# Patient Record
Sex: MALE | Race: WHITE | NOT HISPANIC OR LATINO | Employment: UNEMPLOYED | URBAN - METROPOLITAN AREA
[De-identification: names, ages, dates, MRNs, and addresses within clinical notes are randomized per-mention and may not be internally consistent; named-entity substitution may affect disease eponyms.]

---

## 2018-01-18 ENCOUNTER — DOCUMENTATION ONLY (OUTPATIENT)
Dept: NEPHROLOGY | Facility: HOSPITAL | Age: 68
End: 2018-01-18

## 2018-01-18 ENCOUNTER — HOSPITAL ENCOUNTER (INPATIENT)
Facility: HOSPITAL | Age: 68
LOS: 8 days | Discharge: NURSING, REHAB OR LONG-TERM CARE | DRG: 871 | End: 2018-01-26
Attending: EMERGENCY MEDICINE | Admitting: FAMILY MEDICINE
Payer: MEDICARE

## 2018-01-18 DIAGNOSIS — E87.20 METABOLIC ACIDOSIS: Primary | ICD-10-CM

## 2018-01-18 DIAGNOSIS — T68.XXXA HYPOTHERMIA, INITIAL ENCOUNTER: ICD-10-CM

## 2018-01-18 DIAGNOSIS — Z45.2 ENCOUNTER FOR CENTRAL LINE CARE: ICD-10-CM

## 2018-01-18 DIAGNOSIS — I48.91 ATRIAL FIBRILLATION: ICD-10-CM

## 2018-01-18 DIAGNOSIS — R79.89 ELEVATED TROPONIN I LEVEL: ICD-10-CM

## 2018-01-18 DIAGNOSIS — N17.9 AKI (ACUTE KIDNEY INJURY): ICD-10-CM

## 2018-01-18 DIAGNOSIS — I48.91 A-FIB: ICD-10-CM

## 2018-01-18 DIAGNOSIS — N17.9 ACUTE RENAL FAILURE, UNSPECIFIED ACUTE RENAL FAILURE TYPE: ICD-10-CM

## 2018-01-18 DIAGNOSIS — E87.5 HYPERKALEMIA: ICD-10-CM

## 2018-01-18 DIAGNOSIS — N17.9 ACUTE RENAL FAILURE: ICD-10-CM

## 2018-01-18 PROBLEM — I47.9 PAROXYSMAL TACHYCARDIA: Status: ACTIVE | Noted: 2018-01-18

## 2018-01-18 PROBLEM — E83.39 HYPERPHOSPHATEMIA: Status: ACTIVE | Noted: 2018-01-18

## 2018-01-18 PROBLEM — N19 UREMIA: Status: ACTIVE | Noted: 2018-01-18

## 2018-01-18 PROBLEM — G93.41 ENCEPHALOPATHY, METABOLIC: Status: ACTIVE | Noted: 2018-01-18

## 2018-01-18 PROBLEM — D72.9 NEUTROPHILIC LEUKOCYTOSIS: Status: ACTIVE | Noted: 2018-01-18

## 2018-01-18 PROBLEM — M62.50 MUSCLE WASTING: Status: ACTIVE | Noted: 2018-01-18

## 2018-01-18 LAB
ALBUMIN SERPL BCP-MCNC: 3.2 G/DL
ALLENS TEST: ABNORMAL
ALP SERPL-CCNC: 113 U/L
ALT SERPL W/O P-5'-P-CCNC: 7 U/L
ANION GAP SERPL CALC-SCNC: ABNORMAL MMOL/L
AST SERPL-CCNC: 12 U/L
BACTERIA #/AREA URNS HPF: ABNORMAL /HPF
BASOPHILS # BLD AUTO: ABNORMAL K/UL
BASOPHILS NFR BLD: 1 %
BILIRUB SERPL-MCNC: 0.5 MG/DL
BILIRUB UR QL STRIP: NEGATIVE
BNP SERPL-MCNC: 445 PG/ML
BUN SERPL-MCNC: 57 MG/DL
CALCIUM SERPL-MCNC: 9.9 MG/DL
CAOX CRY URNS QL MICRO: ABNORMAL
CHLORIDE SERPL-SCNC: 106 MMOL/L
CLARITY UR: ABNORMAL
CO2 SERPL-SCNC: <5 MMOL/L
COLOR UR: YELLOW
CREAT SERPL-MCNC: 12.1 MG/DL
DELSYS: ABNORMAL
DIFFERENTIAL METHOD: ABNORMAL
EOSINOPHIL # BLD AUTO: ABNORMAL K/UL
EOSINOPHIL NFR BLD: 0 %
ERYTHROCYTE [DISTWIDTH] IN BLOOD BY AUTOMATED COUNT: 13.2 %
ERYTHROCYTE [SEDIMENTATION RATE] IN BLOOD BY WESTERGREN METHOD: 18 MM/H
ERYTHROCYTE [SEDIMENTATION RATE] IN BLOOD BY WESTERGREN METHOD: 18 MM/H
EST. GFR  (AFRICAN AMERICAN): 4 ML/MIN/1.73 M^2
EST. GFR  (NON AFRICAN AMERICAN): 4 ML/MIN/1.73 M^2
FIO2: 0.3
FIO2: 50
FLOW: 4
FLOW: 4
GLUCOSE SERPL-MCNC: 122 MG/DL
GLUCOSE UR QL STRIP: NEGATIVE
HCO3 UR-SCNC: 1.1 MMOL/L (ref 24–28)
HCO3 UR-SCNC: 1.5 MMOL/L (ref 24–28)
HCO3 UR-SCNC: 11.3 MMOL/L (ref 24–28)
HCO3 UR-SCNC: 3.4 MMOL/L (ref 24–28)
HCT VFR BLD AUTO: 33.2 %
HGB BLD-MCNC: 10.1 G/DL
HGB UR QL STRIP: NEGATIVE
HYALINE CASTS #/AREA URNS LPF: 1 /LPF
KETONES UR QL STRIP: NEGATIVE
LACTATE SERPL-SCNC: >12 MMOL/L
LEUKOCYTE ESTERASE UR QL STRIP: NEGATIVE
LYMPHOCYTES # BLD AUTO: ABNORMAL K/UL
LYMPHOCYTES NFR BLD: 5 %
MAGNESIUM SERPL-MCNC: 2.3 MG/DL
MCH RBC QN AUTO: 29.7 PG
MCHC RBC AUTO-ENTMCNC: 30.4 G/DL
MCV RBC AUTO: 98 FL
MICROSCOPIC COMMENT: ABNORMAL
MIN VOL: 9.8
MODE: ABNORMAL
MONOCYTES # BLD AUTO: ABNORMAL K/UL
MONOCYTES NFR BLD: 7 %
MYELOCYTES NFR BLD MANUAL: 1 %
NEUTROPHILS NFR BLD: 79 %
NEUTS BAND NFR BLD MANUAL: 7 %
NITRITE UR QL STRIP: NEGATIVE
PCO2 BLDA: 10 MMHG (ref 35–45)
PCO2 BLDA: 14.5 MMHG (ref 35–45)
PCO2 BLDA: 20.9 MMHG (ref 35–45)
PCO2 BLDA: 22 MMHG (ref 35–45)
PEEP: 5
PEEP: 5
PH SMN: 6.63 [PH] (ref 7.35–7.45)
PH SMN: 6.63 [PH] (ref 7.35–7.45)
PH SMN: 6.82 [PH] (ref 7.35–7.45)
PH SMN: 7.32 [PH] (ref 7.35–7.45)
PH UR STRIP: 5 [PH] (ref 5–8)
PHOSPHATE SERPL-MCNC: 14.4 MG/DL
PLATELET # BLD AUTO: 673 K/UL
PLATELET BLD QL SMEAR: ABNORMAL
PMV BLD AUTO: 10.3 FL
PO2 BLDA: 181 MMHG (ref 80–100)
PO2 BLDA: 189 MMHG (ref 80–100)
PO2 BLDA: 192 MMHG (ref 80–100)
PO2 BLDA: 358 MMHG (ref 80–100)
POC BE: -13 MMOL/L
POC BE: -29 MMOL/L
POC BE: <-30 MMOL/L
POC BE: <-30 MMOL/L
POC SATURATED O2: 100 % (ref 95–100)
POC SATURATED O2: 100 % (ref 95–100)
POC SATURATED O2: 97 % (ref 95–100)
POC SATURATED O2: 97 % (ref 95–100)
POC TCO2: 12 MMOL/L (ref 23–27)
POC TCO2: <5 MMOL/L (ref 23–27)
POCT GLUCOSE: 138 MG/DL (ref 70–110)
POCT GLUCOSE: 219 MG/DL (ref 70–110)
POCT GLUCOSE: 232 MG/DL (ref 70–110)
POCT GLUCOSE: 278 MG/DL (ref 70–110)
POCT GLUCOSE: 310 MG/DL (ref 70–110)
POTASSIUM SERPL-SCNC: 7.4 MMOL/L
PROT SERPL-MCNC: 6.6 G/DL
PROT UR QL STRIP: ABNORMAL
RBC # BLD AUTO: 3.4 M/UL
RBC #/AREA URNS HPF: 1 /HPF (ref 0–4)
SAMPLE: ABNORMAL
SITE: ABNORMAL
SODIUM SERPL-SCNC: 141 MMOL/L
SP GR UR STRIP: 1.01 (ref 1–1.03)
SP02: 100
SP02: 100
SP02: 99
SQUAMOUS #/AREA URNS HPF: ABNORMAL /HPF
T4 FREE SERPL-MCNC: 0.71 NG/DL
TOXIC GRANULES BLD QL SMEAR: PRESENT
TROPONIN I SERPL DL<=0.01 NG/ML-MCNC: 0.28 NG/ML
TSH SERPL DL<=0.005 MIU/L-ACNC: 10.76 UIU/ML
URN SPEC COLLECT METH UR: ABNORMAL
UROBILINOGEN UR STRIP-ACNC: NEGATIVE EU/DL
VT: 450
VT: 450
WBC # BLD AUTO: 20.56 K/UL
WBC #/AREA URNS HPF: 3 /HPF (ref 0–5)

## 2018-01-18 PROCEDURE — 63600175 PHARM REV CODE 636 W HCPCS: Performed by: INTERNAL MEDICINE

## 2018-01-18 PROCEDURE — C9113 INJ PANTOPRAZOLE SODIUM, VIA: HCPCS | Performed by: NURSE PRACTITIONER

## 2018-01-18 PROCEDURE — 94761 N-INVAS EAR/PLS OXIMETRY MLT: CPT

## 2018-01-18 PROCEDURE — 99900035 HC TECH TIME PER 15 MIN (STAT)

## 2018-01-18 PROCEDURE — 96361 HYDRATE IV INFUSION ADD-ON: CPT

## 2018-01-18 PROCEDURE — 85027 COMPLETE CBC AUTOMATED: CPT

## 2018-01-18 PROCEDURE — 85007 BL SMEAR W/DIFF WBC COUNT: CPT

## 2018-01-18 PROCEDURE — 83970 ASSAY OF PARATHORMONE: CPT

## 2018-01-18 PROCEDURE — 84484 ASSAY OF TROPONIN QUANT: CPT

## 2018-01-18 PROCEDURE — 90935 HEMODIALYSIS ONE EVALUATION: CPT

## 2018-01-18 PROCEDURE — 87340 HEPATITIS B SURFACE AG IA: CPT

## 2018-01-18 PROCEDURE — 63600175 PHARM REV CODE 636 W HCPCS: Performed by: EMERGENCY MEDICINE

## 2018-01-18 PROCEDURE — 84439 ASSAY OF FREE THYROXINE: CPT

## 2018-01-18 PROCEDURE — 82803 BLOOD GASES ANY COMBINATION: CPT

## 2018-01-18 PROCEDURE — 81000 URINALYSIS NONAUTO W/SCOPE: CPT

## 2018-01-18 PROCEDURE — 83605 ASSAY OF LACTIC ACID: CPT

## 2018-01-18 PROCEDURE — 25000003 PHARM REV CODE 250

## 2018-01-18 PROCEDURE — 93010 ELECTROCARDIOGRAM REPORT: CPT | Mod: ,,, | Performed by: INTERNAL MEDICINE

## 2018-01-18 PROCEDURE — 36556 INSERT NON-TUNNEL CV CATH: CPT

## 2018-01-18 PROCEDURE — 83880 ASSAY OF NATRIURETIC PEPTIDE: CPT

## 2018-01-18 PROCEDURE — 99291 CRITICAL CARE FIRST HOUR: CPT | Mod: 25

## 2018-01-18 PROCEDURE — 82962 GLUCOSE BLOOD TEST: CPT

## 2018-01-18 PROCEDURE — 94002 VENT MGMT INPAT INIT DAY: CPT

## 2018-01-18 PROCEDURE — 36415 COLL VENOUS BLD VENIPUNCTURE: CPT

## 2018-01-18 PROCEDURE — 27000221 HC OXYGEN, UP TO 24 HOURS

## 2018-01-18 PROCEDURE — 96368 THER/DIAG CONCURRENT INF: CPT

## 2018-01-18 PROCEDURE — 80053 COMPREHEN METABOLIC PANEL: CPT

## 2018-01-18 PROCEDURE — 82533 TOTAL CORTISOL: CPT

## 2018-01-18 PROCEDURE — 87086 URINE CULTURE/COLONY COUNT: CPT

## 2018-01-18 PROCEDURE — 93005 ELECTROCARDIOGRAM TRACING: CPT

## 2018-01-18 PROCEDURE — 25000003 PHARM REV CODE 250: Performed by: EMERGENCY MEDICINE

## 2018-01-18 PROCEDURE — 20000000 HC ICU ROOM

## 2018-01-18 PROCEDURE — 96366 THER/PROPH/DIAG IV INF ADDON: CPT

## 2018-01-18 PROCEDURE — 25000003 PHARM REV CODE 250: Performed by: INTERNAL MEDICINE

## 2018-01-18 PROCEDURE — 86704 HEP B CORE ANTIBODY TOTAL: CPT

## 2018-01-18 PROCEDURE — 83735 ASSAY OF MAGNESIUM: CPT

## 2018-01-18 PROCEDURE — 84100 ASSAY OF PHOSPHORUS: CPT

## 2018-01-18 PROCEDURE — 0BH17EZ INSERTION OF ENDOTRACHEAL AIRWAY INTO TRACHEA, VIA NATURAL OR ARTIFICIAL OPENING: ICD-10-PCS | Performed by: FAMILY MEDICINE

## 2018-01-18 PROCEDURE — 63600175 PHARM REV CODE 636 W HCPCS

## 2018-01-18 PROCEDURE — 02HV33Z INSERTION OF INFUSION DEVICE INTO SUPERIOR VENA CAVA, PERCUTANEOUS APPROACH: ICD-10-PCS | Performed by: INTERNAL MEDICINE

## 2018-01-18 PROCEDURE — 86706 HEP B SURFACE ANTIBODY: CPT

## 2018-01-18 PROCEDURE — 87040 BLOOD CULTURE FOR BACTERIA: CPT | Mod: 59

## 2018-01-18 PROCEDURE — 84443 ASSAY THYROID STIM HORMONE: CPT

## 2018-01-18 PROCEDURE — 25000003 PHARM REV CODE 250: Performed by: NURSE PRACTITIONER

## 2018-01-18 PROCEDURE — 96375 TX/PRO/DX INJ NEW DRUG ADDON: CPT

## 2018-01-18 PROCEDURE — 96365 THER/PROPH/DIAG IV INF INIT: CPT

## 2018-01-18 PROCEDURE — 63600175 PHARM REV CODE 636 W HCPCS: Performed by: NURSE PRACTITIONER

## 2018-01-18 PROCEDURE — 51702 INSERT TEMP BLADDER CATH: CPT

## 2018-01-18 PROCEDURE — 5A1945Z RESPIRATORY VENTILATION, 24-96 CONSECUTIVE HOURS: ICD-10-PCS | Performed by: FAMILY MEDICINE

## 2018-01-18 PROCEDURE — 63600175 PHARM REV CODE 636 W HCPCS: Performed by: FAMILY MEDICINE

## 2018-01-18 PROCEDURE — 36600 WITHDRAWAL OF ARTERIAL BLOOD: CPT

## 2018-01-18 RX ORDER — PROPOFOL 10 MG/ML
10 INJECTION, EMULSION INTRAVENOUS CONTINUOUS PRN
Status: DISCONTINUED | OUTPATIENT
Start: 2018-01-18 | End: 2018-01-26 | Stop reason: HOSPADM

## 2018-01-18 RX ORDER — SODIUM BICARBONATE 1 MEQ/ML
SYRINGE (ML) INTRAVENOUS
Status: COMPLETED
Start: 2018-01-18 | End: 2018-01-18

## 2018-01-18 RX ORDER — AMIODARONE HCL/D5W 450 MG/250
1 PLASTIC BAG, INJECTION (ML) INTRAVENOUS CONTINUOUS
Status: DISCONTINUED | OUTPATIENT
Start: 2018-01-18 | End: 2018-01-18

## 2018-01-18 RX ORDER — PANTOPRAZOLE SODIUM 40 MG/10ML
40 INJECTION, POWDER, LYOPHILIZED, FOR SOLUTION INTRAVENOUS DAILY
Status: DISCONTINUED | OUTPATIENT
Start: 2018-01-18 | End: 2018-01-19

## 2018-01-18 RX ORDER — LIDOCAINE HYDROCHLORIDE 10 MG/ML
INJECTION INFILTRATION; PERINEURAL
Status: COMPLETED
Start: 2018-01-18 | End: 2018-01-18

## 2018-01-18 RX ORDER — CIPROFLOXACIN 2 MG/ML
400 INJECTION, SOLUTION INTRAVENOUS
Status: COMPLETED | OUTPATIENT
Start: 2018-01-18 | End: 2018-01-18

## 2018-01-18 RX ORDER — CEFEPIME HYDROCHLORIDE 1 G/1
1 INJECTION, POWDER, FOR SOLUTION INTRAMUSCULAR; INTRAVENOUS
Status: DISCONTINUED | OUTPATIENT
Start: 2018-01-19 | End: 2018-01-22

## 2018-01-18 RX ORDER — CEFEPIME HYDROCHLORIDE 1 G/1
1 INJECTION, POWDER, FOR SOLUTION INTRAMUSCULAR; INTRAVENOUS
Status: COMPLETED | OUTPATIENT
Start: 2018-01-18 | End: 2018-01-18

## 2018-01-18 RX ORDER — CALCIUM GLUCONATE 98 MG/ML
INJECTION, SOLUTION INTRAVENOUS
Status: COMPLETED
Start: 2018-01-18 | End: 2018-01-18

## 2018-01-18 RX ORDER — ONDANSETRON 2 MG/ML
8 INJECTION INTRAMUSCULAR; INTRAVENOUS
Status: COMPLETED | OUTPATIENT
Start: 2018-01-18 | End: 2018-01-18

## 2018-01-18 RX ORDER — CIPROFLOXACIN 2 MG/ML
400 INJECTION, SOLUTION INTRAVENOUS
Status: DISCONTINUED | OUTPATIENT
Start: 2018-01-19 | End: 2018-01-24

## 2018-01-18 RX ORDER — LEVOTHYROXINE SODIUM ANHYDROUS 100 UG/5ML
25 INJECTION, POWDER, LYOPHILIZED, FOR SOLUTION INTRAVENOUS DAILY
Status: DISCONTINUED | OUTPATIENT
Start: 2018-01-18 | End: 2018-01-19

## 2018-01-18 RX ORDER — ONDANSETRON 2 MG/ML
4 INJECTION INTRAMUSCULAR; INTRAVENOUS EVERY 6 HOURS PRN
Status: DISCONTINUED | OUTPATIENT
Start: 2018-01-18 | End: 2018-01-26 | Stop reason: HOSPADM

## 2018-01-18 RX ORDER — SODIUM BICARBONATE 1 MEQ/ML
50 SYRINGE (ML) INTRAVENOUS
Status: COMPLETED | OUTPATIENT
Start: 2018-01-18 | End: 2018-01-18

## 2018-01-18 RX ORDER — SODIUM CHLORIDE 9 MG/ML
INJECTION, SOLUTION INTRAVENOUS CONTINUOUS
Status: DISCONTINUED | OUTPATIENT
Start: 2018-01-18 | End: 2018-01-22

## 2018-01-18 RX ORDER — HEPARIN SODIUM 5000 [USP'U]/ML
5000 INJECTION, SOLUTION INTRAVENOUS; SUBCUTANEOUS
Status: DISCONTINUED | OUTPATIENT
Start: 2018-01-18 | End: 2018-01-26 | Stop reason: HOSPADM

## 2018-01-18 RX ORDER — HEPARIN SODIUM 5000 [USP'U]/ML
5000 INJECTION, SOLUTION INTRAVENOUS; SUBCUTANEOUS EVERY 8 HOURS
Status: DISCONTINUED | OUTPATIENT
Start: 2018-01-18 | End: 2018-01-26

## 2018-01-18 RX ORDER — NOREPINEPHRINE BITARTRATE/D5W 4MG/250ML
0.02 PLASTIC BAG, INJECTION (ML) INTRAVENOUS CONTINUOUS
Status: DISCONTINUED | OUTPATIENT
Start: 2018-01-18 | End: 2018-01-18

## 2018-01-18 RX ORDER — SODIUM BICARBONATE 1 MEQ/ML
100 SYRINGE (ML) INTRAVENOUS ONCE
Status: COMPLETED | OUTPATIENT
Start: 2018-01-18 | End: 2018-01-18

## 2018-01-18 RX ORDER — DEXMEDETOMIDINE HYDROCHLORIDE 4 UG/ML
0.4 INJECTION, SOLUTION INTRAVENOUS CONTINUOUS
Status: DISCONTINUED | OUTPATIENT
Start: 2018-01-18 | End: 2018-01-21

## 2018-01-18 RX ORDER — NOREPINEPHRINE BITARTRATE/D5W 4MG/250ML
PLASTIC BAG, INJECTION (ML) INTRAVENOUS
Status: COMPLETED
Start: 2018-01-18 | End: 2018-01-18

## 2018-01-18 RX ORDER — ONDANSETRON 2 MG/ML
4 INJECTION INTRAMUSCULAR; INTRAVENOUS EVERY 8 HOURS PRN
Status: DISCONTINUED | OUTPATIENT
Start: 2018-01-18 | End: 2018-01-18

## 2018-01-18 RX ADMIN — CEFEPIME 1 G: 1 INJECTION, POWDER, FOR SOLUTION INTRAMUSCULAR; INTRAVENOUS at 02:01

## 2018-01-18 RX ADMIN — HEPARIN SODIUM 5000 UNITS: 5000 INJECTION, SOLUTION INTRAVENOUS; SUBCUTANEOUS at 10:01

## 2018-01-18 RX ADMIN — LEVOTHYROXINE SODIUM ANHYDROUS 25 MCG: 100 INJECTION, POWDER, LYOPHILIZED, FOR SOLUTION INTRAVENOUS at 07:01

## 2018-01-18 RX ADMIN — Medication 1000 MG: at 02:01

## 2018-01-18 RX ADMIN — METHYLPREDNISOLONE SODIUM SUCCINATE 40 MG: 40 INJECTION, POWDER, FOR SOLUTION INTRAMUSCULAR; INTRAVENOUS at 05:01

## 2018-01-18 RX ADMIN — SODIUM BICARBONATE 50 MEQ: 84 INJECTION, SOLUTION INTRAVENOUS at 05:01

## 2018-01-18 RX ADMIN — SODIUM BICARBONATE 100 MEQ: 84 INJECTION, SOLUTION INTRAVENOUS at 05:01

## 2018-01-18 RX ADMIN — CALCIUM GLUCONATE: 94 INJECTION, SOLUTION INTRAVENOUS at 05:01

## 2018-01-18 RX ADMIN — CIPROFLOXACIN 400 MG: 2 INJECTION, SOLUTION INTRAVENOUS at 02:01

## 2018-01-18 RX ADMIN — AMIODARONE HYDROCHLORIDE: 50 INJECTION, SOLUTION INTRAVENOUS at 08:01

## 2018-01-18 RX ADMIN — PANTOPRAZOLE SODIUM 40 MG: 40 INJECTION, POWDER, FOR SOLUTION INTRAVENOUS at 08:01

## 2018-01-18 RX ADMIN — CALCIUM GLUCONATE 1 G: 94 INJECTION, SOLUTION INTRAVENOUS at 02:01

## 2018-01-18 RX ADMIN — DEXMEDETOMIDINE HYDROCHLORIDE 0.4 MCG/KG/HR: 100 INJECTION, SOLUTION INTRAVENOUS at 06:01

## 2018-01-18 RX ADMIN — AMIODARONE HYDROCHLORIDE: 50 INJECTION, SOLUTION INTRAVENOUS at 07:01

## 2018-01-18 RX ADMIN — PROPOFOL 10 MCG/KG/MIN: 10 INJECTION, EMULSION INTRAVENOUS at 05:01

## 2018-01-18 RX ADMIN — SODIUM CHLORIDE 1000 ML: 0.9 INJECTION, SOLUTION INTRAVENOUS at 07:01

## 2018-01-18 RX ADMIN — AMIODARONE HYDROCHLORIDE 150 MG: 1.5 INJECTION, SOLUTION INTRAVENOUS at 07:01

## 2018-01-18 RX ADMIN — DEXMEDETOMIDINE HYDROCHLORIDE 1.4 MCG/KG/HR: 100 INJECTION, SOLUTION INTRAVENOUS at 09:01

## 2018-01-18 RX ADMIN — HEPARIN SODIUM 5000 UNITS: 5000 INJECTION, SOLUTION INTRAVENOUS; SUBCUTANEOUS at 08:01

## 2018-01-18 RX ADMIN — DEXTROSE MONOHYDRATE 25 G: 25 INJECTION, SOLUTION INTRAVENOUS at 01:01

## 2018-01-18 RX ADMIN — Medication: at 05:01

## 2018-01-18 RX ADMIN — INSULIN HUMAN 7.03 UNITS: 100 INJECTION, SOLUTION PARENTERAL at 01:01

## 2018-01-18 RX ADMIN — ONDANSETRON 8 MG: 2 INJECTION INTRAMUSCULAR; INTRAVENOUS at 01:01

## 2018-01-18 RX ADMIN — Medication 100 MEQ: at 05:01

## 2018-01-18 RX ADMIN — SODIUM BICARBONATE 100 MEQ: 84 INJECTION, SOLUTION INTRAVENOUS at 08:01

## 2018-01-18 RX ADMIN — SODIUM CHLORIDE: 0.9 INJECTION, SOLUTION INTRAVENOUS at 04:01

## 2018-01-18 RX ADMIN — LIDOCAINE HYDROCHLORIDE: 10 INJECTION, SOLUTION INFILTRATION; PERINEURAL at 05:01

## 2018-01-18 RX ADMIN — NOREPINEPHRINE BITARTRATE 0.02 MCG/KG/MIN: 1 INJECTION INTRAVENOUS at 07:01

## 2018-01-18 RX ADMIN — PROPOFOL 50 MCG/KG/MIN: 10 INJECTION, EMULSION INTRAVENOUS at 08:01

## 2018-01-18 RX ADMIN — SODIUM CHLORIDE 1000 ML: 0.9 INJECTION, SOLUTION INTRAVENOUS at 01:01

## 2018-01-18 RX ADMIN — SODIUM BICARBONATE: 84 INJECTION, SOLUTION INTRAVENOUS at 03:01

## 2018-01-18 NOTE — PROGRESS NOTES
Results for ERNA PEREZ (MRN [id number]) as of 1/18/2018 17:36   Ref. Range 1/18/2018 16:00   POC PH Latest Ref Range: 7.35 - 7.45  6.630 (LL)   POC PCO2 Latest Ref Range: 35 - 45 mmHg 10.0 (LL)   POC PO2 Latest Ref Range: 80 - 100 mmHg 189 (H)   POC BE Latest Ref Range: -2 to 2 mmol/L <-30 (L)   POC HCO3 Latest Ref Range: 24 - 28 mmol/L 1.1 (L)   POC SATURATED O2 Latest Ref Range: 95 - 100 % 97   POC TCO2 Latest Ref Range: 23 - 27 mmol/L <5 (L)   Flow Unknown 4   Sample Unknown ARTERIAL   DelSys Unknown Nasal Can   Allens Test Unknown Pass   Site Unknown LR   Mode Unknown SPONT   Sp02 Unknown 100   ABG was done and reported to Dr Medeiros. Pt was intubated with 7.5 ETT@23 at lip. Vent settings PRVC/18/450/+5/50% sats 100%.

## 2018-01-18 NOTE — PROCEDURES
After obtaining informed consent proceeded to gown up with sterile gown and gloves , mask wore as well as eye protection, then cleaned the R groin area with antiseptic provided. Used 1% lidocaine without epinephrine and provided superficial as well as superficial anesthesia.    Following sterile technic and Seldinger technic cannulated the R femoral vein with 18 gauge needle, single pass was needed to localize the vein. Advanced a flexible guidewire to about 20 cm, removed the needle then made a skin nick with an 11 scalpel. Advanced a vein dilator which was also removed. The the Arnaldo catheter was advanced into the femoral vein and stitched in place with vicryl stitches x2.    The area was examined afterwards, a clean sterile dressing was applied. Cathete lumens were flushed and locked with 10 cc NSS.    The pateint had a 10 cc blood loss and there were no hematomas post procedure and there are good popliteal, and DP pulses.

## 2018-01-18 NOTE — CONSULTS
Ameya Malloy MD Physician Sign at close encounter   Progress Notes Encounter Date: 1/18/2018  2:44 PM   Related encounter: Documentation Only from 1/18/2018 in [facility]       Expand All Collapse All    []Hide copied text  []Hover for attribution information  Unfortunate 67 y o male who was brought to the er with AMS and increased resp rate. Renal consult requested because SOURAV, Hyperk and metabolic acidosis.     Pt is minimally responsive  No past medical history on file.      Allergies unklnown     No Known Allergies     No family history on file.     Social History   Social History            Social History    Marital status: Single       Spouse name: N/A    Number of children: N/A    Years of education: N/A          Occupational History    Not on file.      Social History Main Topics    Smoking status: Not on file    Smokeless tobacco: Not on file    Alcohol use Not on file    Drug use: Unknown    Sexual activity: Not on file           Other Topics Concern    Not on file          Social History Narrative    No narrative on file               No current facility-administered medications for this visit.       No current outpatient prescriptions on file.          Facility-Administered Medications Ordered in Other Visits   Medication    calcium gluconate 1g in dextrose 5% 100mL (ready to mix system)    ciprofloxacin (CIPRO)400mg/200ml D5W IVPB 400 mg    dextrose 50% injection 25 g    sodium bicarbonate 1 mEq/mL (8.4 %) 150 mEq in dextrose 5 % 1,000 mL infusion    sodium bicarbonate 8.4 % (1 mEq/mL) injection 50 mEq    sodium bicarbonate 8.4 % (1 mEq/mL) injection 50 mEq    sodium polystyrene 15 gram/60 mL suspension 30 g    vancomycin 1 g in dextrose 5 % 250 mL IVPB (ready to mix system)         LABS     Recent Results         Recent Results (from the past 24 hour(s))   POCT glucose     Collection Time: 01/18/18 12:11 PM   Result Value Ref Range     POCT Glucose 138 (H) 70 -  110 mg/dL   CBC auto differential     Collection Time: 01/18/18 12:25 PM   Result Value Ref Range     WBC 20.56 (H) 3.90 - 12.70 K/uL     RBC 3.40 (L) 4.60 - 6.20 M/uL     Hemoglobin 10.1 (L) 14.0 - 18.0 g/dL     Hematocrit 33.2 (L) 40.0 - 54.0 %     MCV 98 82 - 98 fL     MCH 29.7 27.0 - 31.0 pg     MCHC 30.4 (L) 32.0 - 36.0 g/dL     RDW 13.2 11.5 - 14.5 %     Platelets 673 (H) 150 - 350 K/uL     MPV 10.3 9.2 - 12.9 fL     Lymph # CANCELED 1.0 - 4.8 K/uL     Mono # CANCELED 0.3 - 1.0 K/uL     Eos # CANCELED 0.0 - 0.5 K/uL     Baso # CANCELED 0.00 - 0.20 K/uL     Gran% 79.0 (H) 38.0 - 73.0 %     Lymph% 5.0 (L) 18.0 - 48.0 %     Mono% 7.0 4.0 - 15.0 %     Eosinophil% 0.0 0.0 - 8.0 %     Basophil% 1.0 0.0 - 1.9 %     Bands 7.0 %     Myelocytes 1.0 %     Platelet Estimate Increased (A)       Toxic Granulation Present       Differential Method Manual     Comprehensive metabolic panel     Collection Time: 01/18/18 12:25 PM   Result Value Ref Range     Sodium 141 136 - 145 mmol/L     Potassium 7.4 (HH) 3.5 - 5.1 mmol/L     Chloride 106 95 - 110 mmol/L     CO2 <5 (LL) 23 - 29 mmol/L     Glucose 122 (H) 70 - 110 mg/dL     BUN, Bld 57 (H) 8 - 23 mg/dL     Creatinine 12.1 (H) 0.5 - 1.4 mg/dL     Calcium 9.9 8.7 - 10.5 mg/dL     Total Protein 6.6 6.0 - 8.4 g/dL     Albumin 3.2 (L) 3.5 - 5.2 g/dL     Total Bilirubin 0.5 0.1 - 1.0 mg/dL     Alkaline Phosphatase 113 55 - 135 U/L     AST 12 10 - 40 U/L     ALT 7 (L) 10 - 44 U/L     Anion Gap Unable to calculate 8 - 16 mmol/L     eGFR if African American 4 (A) >60 mL/min/1.73 m^2     eGFR if non African American 4 (A) >60 mL/min/1.73 m^2   Troponin I     Collection Time: 01/18/18 12:25 PM   Result Value Ref Range     Troponin I 0.278 (H) 0.000 - 0.026 ng/mL   B-Type natriuretic peptide (BNP)     Collection Time: 01/18/18 12:25 PM   Result Value Ref Range      (H) 0 - 99 pg/mL   Lactic acid, plasma     Collection Time: 01/18/18  1:30 PM   Result Value Ref Range     Lactate  (Lactic Acid) >12.0 (HH) 0.5 - 2.2 mmol/L   ISTAT PROCEDURE     Collection Time: 01/18/18  2:17 PM   Result Value Ref Range     POC PH 6.630 (LL) 7.35 - 7.45     POC PCO2 14.5 (LL) 35 - 45 mmHg     POC PO2 192 (H) 80 - 100 mmHg     POC HCO3 1.5 (L) 24 - 28 mmol/L     POC BE <-30 (L) -2 to 2 mmol/L     POC SATURATED O2 97 95 - 100 %     POC TCO2 <5 (L) 23 - 27 mmol/L     Sample ARTERIAL       Site LR       Allens Test Pass       DelSys Nasal Can       Mode SPONT       Flow 4       Sp02 99     POCT glucose     Collection Time: 01/18/18  2:35 PM   Result Value Ref Range     POCT Glucose 278 (H) 70 - 110 mg/dL         115/60 72/min 26/min hypothermic (91)  No Jvd, Thyromegaly or Lymphadenopathy  Lungs: Fairly clear anteriorly and laterally  Cor: RRR no G or rubs  Abd: Soft benign good bowel sounds non tender  Ext: No E C C  Skin dry and tenting over clavicles     A)  Severe wide anion gap met acidosis (L Ac 12+)  Severe SOURAV  Dehydration  Sepsis until proven otherwise  Pos card markers  HyperK  DM  Mild relative hypotension  Elevated WBC with L shift  Anemia with normal mcv  Hyper po4     Recom:     Admit to ICU ASAP  ASAP HD (do or die)  TLC  Cont bicab drip  CA iv  Might need intubation and mechanical vent       PS    Repeat ABG's again severe metabolic acidosis = will intubate and recheck    Will need HD ASAP:, [facility]    No family available they are in route no phone sv where they are at                Talked to IR and Anesthesia; Anesthesia told me that they do not insert double                  lumen IV catheters. They recommend consult with IR.                 Talked to IR they are willing and able to insert a double lumen cath but only at                the IR suite.     This patient is too unstable to leave a critical care area therefore I will insert an HD cath   Verbal consent obtained from patients sister (Mrs Jeff) at 4:35 pm      Pt on HD not stable, on levophed, hypothermia continues. Ca and bicarb  given, bicarb drip increased.     RN's noted black tarry stool !!

## 2018-01-18 NOTE — ED PROVIDER NOTES
Encounter Date: 1/18/2018       History     Chief Complaint   Patient presents with    Atrial Fibrillation     Pt sent from [facility] with new onset of Afib; along with n/v      HPI   This 67-year-old male presents to emergency room after being sent from [facility] for new onset of atrial fibrillation along with nausea vomiting and rapid respirations.  The patient appears to be an impaired historian.  Some of his speech is unintelligible.  He was found to have sat oxygen saturations of 99% in the field.  The patient relates that he was vomiting.  He did vomit 7 times.  There is no history of abdominal pain or diarrhea.  The patient has a history of gastroesophageal reflux disease hypertension diabetes and hypothyroidism.  He has a history of depression and muscular weakness.  Review of patient's allergies indicates:  No Known Allergies  History reviewed. No pertinent past medical history.  History reviewed. No pertinent surgical history.  History reviewed. No pertinent family history.  Social History   Substance Use Topics    Smoking status: Not on file    Smokeless tobacco: Not on file    Alcohol use Not on file     Review of Systems  The patient was questioned specifically with regard to the following.  General: Fever, chills, sweats. Neuro: Headache. Eyes: eye problems. ENT: Ear pain, sore throat. Cardiovascular: Chest pain. Respiratory: Cough, shortness of breath. Gastrointestinal: Abdominal pain, vomiting, diarrhea. Genitourinary: Painful urination.  Musculoskeletal: Arm and leg problems. Skin: Rash.  The review of systems was negative except for the following: Rapid respiration vomiting, possible new atrial fibrillation.  Physical Exam     Initial Vitals [01/18/18 1128]   BP Pulse Resp Temp SpO2   118/87 75 (!) 30 -- 100 %      MAP       97.33         Physical Exam  The patient was examined specifically for the following:   General:No significant distress, Good color, Warm and dry. Head and  neck:Scalp atraumatic, Neck supple. Neurological:Appropriate conversation, Gross motor deficits. Eyes:Conjugate gaze, Clear corneas. ENT: No epistaxis. Cardiac: Regular rate and rhythm, Grossly normal heart tones. Pulmonary: Wheezing, Rales. Gastrointestinal: Abdominal tenderness, Abdominal distention. Musculoskeletal: Extremity deformity, Apparent pain with range of motion of the joints. Skin: Rash.   The findings on examination were normal except for the following: The patient seems to have some muscle wasting diffusely.  He mumbles.  He holds his eyes closed.  He is responsive in conversation.  Sometimes he mumbles in the speech is unintelligible.  He seems to understand questions and answers them.  He has no focal motor neurologic deficit.  His lips and mucous membranes are dry.  ED Course   Central Line  Date/Time: 1/18/2018 9:35 PM  Performed by: LEIGHA LUONG  Indications: med administration and vascular access  Anesthesia: local infiltration    Anesthesia:  Local Anesthetic: lidocaine 1% without epinephrine  Preparation: skin prepped with ChloraPrep  Location details: right internal jugular  Catheter size: 7 Fr  Ultrasound guidance: yes  Vessel Caliber: mediumNeedle advanced into vessel with real time Ultrasound guidance.  Number of attempts: 1  Assessment: placement verified by x-ray  Specimens: No  Complications: No    Critical Care  Date/Time: 1/18/2018 9:41 PM  Performed by: LEIGHA LUONG  Authorized by: LUIS FERNANDO WHITTINGTON   Direct patient critical care time: 22 minutes  Additional history critical care time: 11 minutes  Ordering / reviewing critical care time: 11 minutes  Documentation critical care time: 17 minutes  Consulting other physicians critical care time: 9 minutes  Consult with family critical care time: 4 minutes  Total critical care time (exclusive of procedural time) : 74 minutes  Critical care time was exclusive of separately billable procedures and treating other patients and  teaching time.  Critical care was necessary to treat or prevent imminent or life-threatening deterioration of the following conditions: metabolic crisis and renal failure.  Critical care was time spent personally by me on the following activities: development of treatment plan with patient or surrogate, discussions with consultants, discussions with primary provider, evaluation of patient's response to treatment, examination of patient, obtaining history from patient or surrogate, ordering and performing treatments and interventions, ordering and review of laboratory studies, ordering and review of radiographic studies, pulse oximetry, re-evaluation of patient's condition and review of old charts.        Labs Reviewed   CBC W/ AUTO DIFFERENTIAL - Abnormal; Notable for the following:        Result Value    WBC 20.56 (*)     RBC 3.40 (*)     Hemoglobin 10.1 (*)     Hematocrit 33.2 (*)     MCHC 30.4 (*)     Platelets 673 (*)     Gran% 79.0 (*)     Lymph% 5.0 (*)     Platelet Estimate Increased (*)     All other components within normal limits   COMPREHENSIVE METABOLIC PANEL - Abnormal; Notable for the following:     Potassium 7.4 (*)     CO2 <5 (*)     Glucose 122 (*)     BUN, Bld 57 (*)     Creatinine 12.1 (*)     Albumin 3.2 (*)     ALT 7 (*)     eGFR if  4 (*)     eGFR if non  4 (*)     All other components within normal limits    Narrative:     POTASSIUM AND CO2 critical result(s) called and verbal readback   obtained from KEELY EDWARDS, 01/18/2018 13:30   TROPONIN I - Abnormal; Notable for the following:     Troponin I 0.278 (*)     All other components within normal limits   B-TYPE NATRIURETIC PEPTIDE - Abnormal; Notable for the following:      (*)     All other components within normal limits   LACTIC ACID, PLASMA - Abnormal; Notable for the following:     Lactate (Lactic Acid) >12.0 (*)     All other components within normal limits    Narrative:     Lactic acid critical  result(s) called and verbal readback obtained   from Aviva Malonedy. , 01/18/2018 14:25   URINALYSIS - Abnormal; Notable for the following:     Appearance, UA Cloudy (*)     Protein, UA 1+ (*)     All other components within normal limits   PHOSPHORUS - Abnormal; Notable for the following:     Phosphorus 14.4 (*)     All other components within normal limits    Narrative:     PHOSPHORUS critical result(s) called and verbal readback obtained   from AVIVA UNDERWOOD, 01/18/2018 15:10   URINALYSIS MICROSCOPIC - Abnormal; Notable for the following:     Bacteria, UA Few (*)     All other components within normal limits   POCT GLUCOSE - Abnormal; Notable for the following:     POCT Glucose 138 (*)     All other components within normal limits   ISTAT PROCEDURE - Abnormal; Notable for the following:     POC PH 6.630 (*)     POC PCO2 14.5 (*)     POC PO2 192 (*)     POC HCO3 1.5 (*)     POC BE <-30 (*)     POC TCO2 <5 (*)     All other components within normal limits   POCT GLUCOSE - Abnormal; Notable for the following:     POCT Glucose 278 (*)     All other components within normal limits   CULTURE, URINE   MAGNESIUM   PTH, INTACT   POCT GLUCOSE MONITORING CONTINUOUS     EKG Readings: (Independently Interpreted)   This patient is in an undetermined rhythm.  The heart rate is 72.  I believe this is a sinus rhythm with a first-degree AV block.  P waves are difficult to see.        X-Rays:   Independently Interpreted Readings:   Other Readings:  Chest x-ray fails to reveal pneumothorax pneumonia pleural effusion.    Medical decision making: Given the above this patient presents to emergency room acute renal failure with a creatinine of 12 and a potassium of 7.4.  Patient's serum CO2 is 5.  The patient is hypothermic with a rectal temperature of 91°.  We will attempt to put him on a warming blanket.  I will consult nephrology.  I'm consult [facility] medicine at this time.  I am not sure about the patient's rhythm.  This could  be A. fib, junctional rhythm, sinus rhythm, second-degree AV block.  I believe that his potassium should be corrected before his rhythm is managed.  I believe the patient may be septic.  We will try to put him on a warming blanket.   I discussed this case with Dr. Hill Srivastava.  There are no beds in the ICU.  We have no other warming blankets.  We will attempt to treat this patient as best we can.  Does recommend IV bicarbonate.                        ED Course      Clinical Impression:   The primary encounter diagnosis was Metabolic acidosis. Diagnoses of Atrial fibrillation, Acute renal failure, unspecified acute renal failure type, Hypothermia, initial encounter, Hyperkalemia, Elevated troponin I level, and Encounter for central line care were also pertinent to this visit.                           Willie Lake MD  01/18/18 2129       Willie Lake MD  01/18/18 2138       Willie Lake MD  01/18/18 2138       Willie Lake MD  01/18/18 2140       Willie Lake MD  01/18/18 2142

## 2018-01-18 NOTE — ED TRIAGE NOTES
Pt arrived to ED via EMS from nursing home for N/V, new onset afib and increased work of breathing. On arrival EMS O2 sat was 99% on RA. Pt's breathing is labored at this time and RR is 26 per min.

## 2018-01-18 NOTE — PROGRESS NOTES
Unfortunate 67 y o male who was brought to the er with AMS and increased resp rate. Renal consult requested because SOURAV, Hyperk and metabolic acidosis.    Pt is minimally responsive  No past medical history on file.     Allergies unklnown    No Known Allergies    No family history on file.    Social History     Social History    Marital status: Single     Spouse name: N/A    Number of children: N/A    Years of education: N/A     Occupational History    Not on file.     Social History Main Topics    Smoking status: Not on file    Smokeless tobacco: Not on file    Alcohol use Not on file    Drug use: Unknown    Sexual activity: Not on file     Other Topics Concern    Not on file     Social History Narrative    No narrative on file         No current facility-administered medications for this visit.      No current outpatient prescriptions on file.     Facility-Administered Medications Ordered in Other Visits   Medication    calcium gluconate 1g in dextrose 5% 100mL (ready to mix system)    ciprofloxacin (CIPRO)400mg/200ml D5W IVPB 400 mg    dextrose 50% injection 25 g    sodium bicarbonate 1 mEq/mL (8.4 %) 150 mEq in dextrose 5 % 1,000 mL infusion    sodium bicarbonate 8.4 % (1 mEq/mL) injection 50 mEq    sodium bicarbonate 8.4 % (1 mEq/mL) injection 50 mEq    sodium polystyrene 15 gram/60 mL suspension 30 g    vancomycin 1 g in dextrose 5 % 250 mL IVPB (ready to mix system)       LABS    Recent Results (from the past 24 hour(s))   POCT glucose    Collection Time: 01/18/18 12:11 PM   Result Value Ref Range    POCT Glucose 138 (H) 70 - 110 mg/dL   CBC auto differential    Collection Time: 01/18/18 12:25 PM   Result Value Ref Range    WBC 20.56 (H) 3.90 - 12.70 K/uL    RBC 3.40 (L) 4.60 - 6.20 M/uL    Hemoglobin 10.1 (L) 14.0 - 18.0 g/dL    Hematocrit 33.2 (L) 40.0 - 54.0 %    MCV 98 82 - 98 fL    MCH 29.7 27.0 - 31.0 pg    MCHC 30.4 (L) 32.0 - 36.0 g/dL    RDW 13.2 11.5 - 14.5 %    Platelets 673 (H)  150 - 350 K/uL    MPV 10.3 9.2 - 12.9 fL    Lymph # CANCELED 1.0 - 4.8 K/uL    Mono # CANCELED 0.3 - 1.0 K/uL    Eos # CANCELED 0.0 - 0.5 K/uL    Baso # CANCELED 0.00 - 0.20 K/uL    Gran% 79.0 (H) 38.0 - 73.0 %    Lymph% 5.0 (L) 18.0 - 48.0 %    Mono% 7.0 4.0 - 15.0 %    Eosinophil% 0.0 0.0 - 8.0 %    Basophil% 1.0 0.0 - 1.9 %    Bands 7.0 %    Myelocytes 1.0 %    Platelet Estimate Increased (A)     Toxic Granulation Present     Differential Method Manual    Comprehensive metabolic panel    Collection Time: 01/18/18 12:25 PM   Result Value Ref Range    Sodium 141 136 - 145 mmol/L    Potassium 7.4 (HH) 3.5 - 5.1 mmol/L    Chloride 106 95 - 110 mmol/L    CO2 <5 (LL) 23 - 29 mmol/L    Glucose 122 (H) 70 - 110 mg/dL    BUN, Bld 57 (H) 8 - 23 mg/dL    Creatinine 12.1 (H) 0.5 - 1.4 mg/dL    Calcium 9.9 8.7 - 10.5 mg/dL    Total Protein 6.6 6.0 - 8.4 g/dL    Albumin 3.2 (L) 3.5 - 5.2 g/dL    Total Bilirubin 0.5 0.1 - 1.0 mg/dL    Alkaline Phosphatase 113 55 - 135 U/L    AST 12 10 - 40 U/L    ALT 7 (L) 10 - 44 U/L    Anion Gap Unable to calculate 8 - 16 mmol/L    eGFR if African American 4 (A) >60 mL/min/1.73 m^2    eGFR if non African American 4 (A) >60 mL/min/1.73 m^2   Troponin I    Collection Time: 01/18/18 12:25 PM   Result Value Ref Range    Troponin I 0.278 (H) 0.000 - 0.026 ng/mL   B-Type natriuretic peptide (BNP)    Collection Time: 01/18/18 12:25 PM   Result Value Ref Range     (H) 0 - 99 pg/mL   Lactic acid, plasma    Collection Time: 01/18/18  1:30 PM   Result Value Ref Range    Lactate (Lactic Acid) >12.0 (HH) 0.5 - 2.2 mmol/L   ISTAT PROCEDURE    Collection Time: 01/18/18  2:17 PM   Result Value Ref Range    POC PH 6.630 (LL) 7.35 - 7.45    POC PCO2 14.5 (LL) 35 - 45 mmHg    POC PO2 192 (H) 80 - 100 mmHg    POC HCO3 1.5 (L) 24 - 28 mmol/L    POC BE <-30 (L) -2 to 2 mmol/L    POC SATURATED O2 97 95 - 100 %    POC TCO2 <5 (L) 23 - 27 mmol/L    Sample ARTERIAL     Site LR     Allens Test Pass     DelSys  Nasal Can     Mode SPONT     Flow 4     Sp02 99    POCT glucose    Collection Time: 01/18/18  2:35 PM   Result Value Ref Range    POCT Glucose 278 (H) 70 - 110 mg/dL     115/60 72/min 26/min hypothermic (91)  No Jvd, Thyromegaly or Lymphadenopathy  Lungs: Fairly clear anteriorly and laterally  Cor: RRR no G or rubs  Abd: Soft benign good bowel sounds non tender  Ext: No E C C  Skin dry and tenting over clavicles    A)  Severe wide anion gap met acidosis (L Ac 12+)  Severe SOURAV  Dehydration  Sepsis until proven otherwise  Pos card markers  HyperK  DM  Mild relative hypotension  Elevated WBC with L shift  Anemia with normal mcv    Recom:    Admit to ICU ASAP  ASAP HD (do or die)  TLC  Cont bicab drip  CA iv  Might need intubation and mechanical vent

## 2018-01-18 NOTE — PROGRESS NOTES
Results for ERNA PREEZ (MRN [id number]) as of 1/18/2018 14:35   Ref. Range 1/18/2018 14:17   POC PH Latest Ref Range: 7.35 - 7.45  6.630 (LL)   POC PCO2 Latest Ref Range: 35 - 45 mmHg 14.5 (LL)   POC PO2 Latest Ref Range: 80 - 100 mmHg 192 (H)   POC BE Latest Ref Range: -2 to 2 mmol/L <-30 (L)   POC HCO3 Latest Ref Range: 24 - 28 mmol/L 1.5 (L)   POC SATURATED O2 Latest Ref Range: 95 - 100 % 97   POC TCO2 Latest Ref Range: 23 - 27 mmol/L <5 (L)   Flow Unknown 4   Sample Unknown ARTERIAL   DelSys Unknown Nasal Can   Allens Test Unknown Pass   Site Unknown LR   Mode Unknown SPONT   Sp02 Unknown 99   ABG was done and reported to Dr Lake pt remains on 4L NC

## 2018-01-18 NOTE — LETTER
January 26, 2018    ERNA Jesus  [address] [facility] [address]                [address] [place] [address]  Phone: [phone] To: [facility] ([facility])        , Discharge Planner for above patient    Re: Discharge Preferences    Mr Jesus will need out patient hemo dialysis arranged.  I would suggest [facility] which has many locations nation wide.  This may help patient as he desires to discharge to someplace in [place], [place] area to make more convenient for his son to visit. if patient needs to return to a local SNF on short term basis, [facility] can then assist patient update his hemodialysis location.     Enclosed is a list of SNF facilities in [place] and [place] that would be closer to son that his sister provided on 1/25/18. I have not yet contacted any.      Thank you for assisting the patient with his discharge preferences.      Ofe Guzmán [facility] [place]  [phone]

## 2018-01-19 ENCOUNTER — ANESTHESIA (OUTPATIENT)
Dept: INTENSIVE CARE | Facility: HOSPITAL | Age: 68
DRG: 871 | End: 2018-01-19
Payer: MEDICARE

## 2018-01-19 ENCOUNTER — ANESTHESIA EVENT (OUTPATIENT)
Dept: INTENSIVE CARE | Facility: HOSPITAL | Age: 68
DRG: 871 | End: 2018-01-19
Payer: MEDICARE

## 2018-01-19 PROBLEM — A41.9 SEPSIS: Status: ACTIVE | Noted: 2018-01-19

## 2018-01-19 PROBLEM — J96.00 ACUTE RESPIRATORY FAILURE: Status: ACTIVE | Noted: 2018-01-19

## 2018-01-19 PROBLEM — Z99.11 ON MECHANICALLY ASSISTED VENTILATION: Status: ACTIVE | Noted: 2018-01-19

## 2018-01-19 LAB
ABO + RH BLD: NORMAL
ALBUMIN SERPL BCP-MCNC: 2.4 G/DL
ALBUMIN SERPL BCP-MCNC: 2.5 G/DL
ALLENS TEST: ABNORMAL
ALLENS TEST: ABNORMAL
ANION GAP SERPL CALC-SCNC: 18 MMOL/L
ANION GAP SERPL CALC-SCNC: 28 MMOL/L
ANION GAP SERPL CALC-SCNC: 36 MMOL/L
ANISOCYTOSIS BLD QL SMEAR: SLIGHT
BASOPHILS # BLD AUTO: 0.01 K/UL
BASOPHILS # BLD AUTO: ABNORMAL K/UL
BASOPHILS NFR BLD: 0 %
BASOPHILS NFR BLD: 0 %
BLD GP AB SCN CELLS X3 SERPL QL: NORMAL
BLD PROD TYP BPU: NORMAL
BLD PROD TYP BPU: NORMAL
BLOOD UNIT EXPIRATION DATE: NORMAL
BLOOD UNIT EXPIRATION DATE: NORMAL
BLOOD UNIT TYPE CODE: 5100
BLOOD UNIT TYPE CODE: 5100
BLOOD UNIT TYPE: NORMAL
BLOOD UNIT TYPE: NORMAL
BUN SERPL-MCNC: 25 MG/DL
BUN SERPL-MCNC: 29 MG/DL
BUN SERPL-MCNC: 31 MG/DL
CALCIUM SERPL-MCNC: 7.5 MG/DL
CALCIUM SERPL-MCNC: 7.7 MG/DL
CALCIUM SERPL-MCNC: 7.8 MG/DL
CHLORIDE SERPL-SCNC: 92 MMOL/L
CHLORIDE SERPL-SCNC: 93 MMOL/L
CHLORIDE SERPL-SCNC: 97 MMOL/L
CK MB SERPL-MCNC: 19.8 NG/ML
CK MB SERPL-RTO: 5.5 %
CK SERPL-CCNC: 359 U/L
CO2 SERPL-SCNC: 14 MMOL/L
CO2 SERPL-SCNC: 20 MMOL/L
CO2 SERPL-SCNC: 8 MMOL/L
CODING SYSTEM: NORMAL
CODING SYSTEM: NORMAL
CORTIS SERPL-MCNC: 25.9 UG/DL
CREAT SERPL-MCNC: 4.6 MG/DL
CREAT SERPL-MCNC: 5.1 MG/DL
CREAT SERPL-MCNC: 5.5 MG/DL
DELSYS: ABNORMAL
DELSYS: ABNORMAL
DIASTOLIC DYSFUNCTION: NO
DIFFERENTIAL METHOD: ABNORMAL
DIFFERENTIAL METHOD: ABNORMAL
DISPENSE STATUS: NORMAL
DISPENSE STATUS: NORMAL
EOSINOPHIL # BLD AUTO: 0 K/UL
EOSINOPHIL # BLD AUTO: ABNORMAL K/UL
EOSINOPHIL NFR BLD: 0 %
EOSINOPHIL NFR BLD: 0 %
ERYTHROCYTE [DISTWIDTH] IN BLOOD BY AUTOMATED COUNT: 13.2 %
ERYTHROCYTE [DISTWIDTH] IN BLOOD BY AUTOMATED COUNT: 13.3 %
ERYTHROCYTE [SEDIMENTATION RATE] IN BLOOD BY WESTERGREN METHOD: 24 MM/H
ERYTHROCYTE [SEDIMENTATION RATE] IN BLOOD BY WESTERGREN METHOD: 24 MM/H
EST. GFR  (AFRICAN AMERICAN): 11 ML/MIN/1.73 M^2
EST. GFR  (AFRICAN AMERICAN): 13 ML/MIN/1.73 M^2
EST. GFR  (AFRICAN AMERICAN): 14 ML/MIN/1.73 M^2
EST. GFR  (NON AFRICAN AMERICAN): 10 ML/MIN/1.73 M^2
EST. GFR  (NON AFRICAN AMERICAN): 11 ML/MIN/1.73 M^2
EST. GFR  (NON AFRICAN AMERICAN): 12 ML/MIN/1.73 M^2
ESTIMATED AVG GLUCOSE: 85 MG/DL
ESTIMATED PA SYSTOLIC PRESSURE: 31.09
FERRITIN SERPL-MCNC: 723 NG/ML
FIO2: 0.28
FIO2: 0.28
GLUCOSE SERPL-MCNC: 170 MG/DL
GLUCOSE SERPL-MCNC: 225 MG/DL
GLUCOSE SERPL-MCNC: 276 MG/DL
HBA1C MFR BLD HPLC: 4.6 %
HBV CORE AB SERPL QL IA: NEGATIVE
HBV SURFACE AG SERPL QL IA: NEGATIVE
HCO3 UR-SCNC: 8.4 MMOL/L (ref 24–28)
HCO3 UR-SCNC: 9.7 MMOL/L (ref 24–28)
HCT VFR BLD AUTO: 22.5 %
HCT VFR BLD AUTO: 24.1 %
HGB BLD-MCNC: 7.4 G/DL
HGB BLD-MCNC: 7.9 G/DL
IRON SERPL-MCNC: 84 UG/DL
LACTATE SERPL-SCNC: 7 MMOL/L
LACTATE SERPL-SCNC: >12 MMOL/L
LYMPHOCYTES # BLD AUTO: 1.6 K/UL
LYMPHOCYTES # BLD AUTO: ABNORMAL K/UL
LYMPHOCYTES NFR BLD: 3 %
LYMPHOCYTES NFR BLD: 5.2 %
MAGNESIUM SERPL-MCNC: 1.2 MG/DL
MAGNESIUM SERPL-MCNC: 1.3 MG/DL
MCH RBC QN AUTO: 30.3 PG
MCH RBC QN AUTO: 30.4 PG
MCHC RBC AUTO-ENTMCNC: 32.8 G/DL
MCHC RBC AUTO-ENTMCNC: 32.9 G/DL
MCV RBC AUTO: 92 FL
MCV RBC AUTO: 93 FL
MIN VOL: 12.2
MIN VOL: 12.2
MITRAL VALVE REGURGITATION: NORMAL
MODE: ABNORMAL
MODE: ABNORMAL
MONOCYTES # BLD AUTO: 1.4 K/UL
MONOCYTES # BLD AUTO: ABNORMAL K/UL
MONOCYTES NFR BLD: 4.7 %
MONOCYTES NFR BLD: 5 %
NEUTROPHILS # BLD AUTO: 26.8 K/UL
NEUTROPHILS NFR BLD: 87 %
NEUTROPHILS NFR BLD: 90.1 %
NEUTS BAND NFR BLD MANUAL: 5 %
PCO2 BLDA: 15.6 MMHG (ref 35–45)
PCO2 BLDA: 16 MMHG (ref 35–45)
PEEP: 5
PEEP: 5
PH SMN: 7.33 [PH] (ref 7.35–7.45)
PH SMN: 7.4 [PH] (ref 7.35–7.45)
PHOSPHATE SERPL-MCNC: 4.3 MG/DL
PHOSPHATE SERPL-MCNC: 5 MG/DL
PLATELET # BLD AUTO: 342 K/UL
PLATELET # BLD AUTO: 363 K/UL
PLATELET BLD QL SMEAR: ABNORMAL
PMV BLD AUTO: 10 FL
PMV BLD AUTO: 10.2 FL
PO2 BLDA: 142 MMHG (ref 80–100)
PO2 BLDA: 145 MMHG (ref 80–100)
POC BE: -13 MMOL/L
POC BE: -16 MMOL/L
POC SATURATED O2: 99 % (ref 95–100)
POC SATURATED O2: 99 % (ref 95–100)
POC TCO2: 10 MMOL/L (ref 23–27)
POC TCO2: 9 MMOL/L (ref 23–27)
POCT GLUCOSE: 198 MG/DL (ref 70–110)
POCT GLUCOSE: 217 MG/DL (ref 70–110)
POCT GLUCOSE: 238 MG/DL (ref 70–110)
POCT GLUCOSE: 345 MG/DL (ref 70–110)
POLYCHROMASIA BLD QL SMEAR: ABNORMAL
POTASSIUM SERPL-SCNC: 4.7 MMOL/L
POTASSIUM SERPL-SCNC: 5 MMOL/L
POTASSIUM SERPL-SCNC: 5.1 MMOL/L
PTH-INTACT SERPL-MCNC: 100 PG/ML
RBC # BLD AUTO: 2.44 M/UL
RBC # BLD AUTO: 2.6 M/UL
RETICS/RBC NFR AUTO: 1.8 %
RETIRED EF AND QEF - SEE NOTES: 60 (ref 55–65)
SAMPLE: ABNORMAL
SAMPLE: ABNORMAL
SATURATED IRON: 53 %
SITE: ABNORMAL
SITE: ABNORMAL
SODIUM SERPL-SCNC: 134 MMOL/L
SODIUM SERPL-SCNC: 135 MMOL/L
SODIUM SERPL-SCNC: 137 MMOL/L
SP02: 100
SP02: 100
SPHEROCYTES BLD QL SMEAR: ABNORMAL
TOTAL IRON BINDING CAPACITY: 158 UG/DL
TOXIC GRANULES BLD QL SMEAR: PRESENT
TRANS ERYTHROCYTES VOL PATIENT: NORMAL ML
TRANS ERYTHROCYTES VOL PATIENT: NORMAL ML
TRANSFERRIN SERPL-MCNC: 107 MG/DL
TRICUSPID VALVE REGURGITATION: NORMAL
TROPONIN I SERPL DL<=0.01 NG/ML-MCNC: 1.31 NG/ML
TROPONIN I SERPL DL<=0.01 NG/ML-MCNC: 1.56 NG/ML
TROPONIN I SERPL DL<=0.01 NG/ML-MCNC: 1.76 NG/ML
TROPONIN I SERPL DL<=0.01 NG/ML-MCNC: 1.91 NG/ML
VT: 450
VT: 450
WBC # BLD AUTO: 25.51 K/UL
WBC # BLD AUTO: 29.73 K/UL

## 2018-01-19 PROCEDURE — 94761 N-INVAS EAR/PLS OXIMETRY MLT: CPT

## 2018-01-19 PROCEDURE — 36620 INSERTION CATHETER ARTERY: CPT | Mod: ,,, | Performed by: ANESTHESIOLOGY

## 2018-01-19 PROCEDURE — 85045 AUTOMATED RETICULOCYTE COUNT: CPT

## 2018-01-19 PROCEDURE — 82803 BLOOD GASES ANY COMBINATION: CPT

## 2018-01-19 PROCEDURE — 83605 ASSAY OF LACTIC ACID: CPT | Mod: 91

## 2018-01-19 PROCEDURE — 25000003 PHARM REV CODE 250: Performed by: HOSPITALIST

## 2018-01-19 PROCEDURE — 99291 CRITICAL CARE FIRST HOUR: CPT | Mod: ,,, | Performed by: INTERNAL MEDICINE

## 2018-01-19 PROCEDURE — 36600 WITHDRAWAL OF ARTERIAL BLOOD: CPT

## 2018-01-19 PROCEDURE — 86850 RBC ANTIBODY SCREEN: CPT

## 2018-01-19 PROCEDURE — 99900035 HC TECH TIME PER 15 MIN (STAT)

## 2018-01-19 PROCEDURE — 63600175 PHARM REV CODE 636 W HCPCS: Performed by: HOSPITALIST

## 2018-01-19 PROCEDURE — 93306 TTE W/DOPPLER COMPLETE: CPT

## 2018-01-19 PROCEDURE — 82728 ASSAY OF FERRITIN: CPT

## 2018-01-19 PROCEDURE — 25000003 PHARM REV CODE 250: Performed by: INTERNAL MEDICINE

## 2018-01-19 PROCEDURE — 83036 HEMOGLOBIN GLYCOSYLATED A1C: CPT

## 2018-01-19 PROCEDURE — 83735 ASSAY OF MAGNESIUM: CPT

## 2018-01-19 PROCEDURE — 25000003 PHARM REV CODE 250: Performed by: EMERGENCY MEDICINE

## 2018-01-19 PROCEDURE — 84484 ASSAY OF TROPONIN QUANT: CPT

## 2018-01-19 PROCEDURE — 85027 COMPLETE CBC AUTOMATED: CPT

## 2018-01-19 PROCEDURE — 80069 RENAL FUNCTION PANEL: CPT | Mod: 91

## 2018-01-19 PROCEDURE — 86920 COMPATIBILITY TEST SPIN: CPT

## 2018-01-19 PROCEDURE — 63600175 PHARM REV CODE 636 W HCPCS: Performed by: FAMILY MEDICINE

## 2018-01-19 PROCEDURE — 63600175 PHARM REV CODE 636 W HCPCS: Performed by: INTERNAL MEDICINE

## 2018-01-19 PROCEDURE — 85025 COMPLETE CBC W/AUTO DIFF WBC: CPT

## 2018-01-19 PROCEDURE — 99900026 HC AIRWAY MAINTENANCE (STAT)

## 2018-01-19 PROCEDURE — P9021 RED BLOOD CELLS UNIT: HCPCS

## 2018-01-19 PROCEDURE — 36415 COLL VENOUS BLD VENIPUNCTURE: CPT

## 2018-01-19 PROCEDURE — 82553 CREATINE MB FRACTION: CPT

## 2018-01-19 PROCEDURE — 27000221 HC OXYGEN, UP TO 24 HOURS

## 2018-01-19 PROCEDURE — 83605 ASSAY OF LACTIC ACID: CPT

## 2018-01-19 PROCEDURE — 25000003 PHARM REV CODE 250: Performed by: NURSE PRACTITIONER

## 2018-01-19 PROCEDURE — 90945 DIALYSIS ONE EVALUATION: CPT

## 2018-01-19 PROCEDURE — 63600175 PHARM REV CODE 636 W HCPCS: Performed by: EMERGENCY MEDICINE

## 2018-01-19 PROCEDURE — 83540 ASSAY OF IRON: CPT

## 2018-01-19 PROCEDURE — 80048 BASIC METABOLIC PNL TOTAL CA: CPT

## 2018-01-19 PROCEDURE — 84484 ASSAY OF TROPONIN QUANT: CPT | Mod: 91

## 2018-01-19 PROCEDURE — C9113 INJ PANTOPRAZOLE SODIUM, VIA: HCPCS | Performed by: INTERNAL MEDICINE

## 2018-01-19 PROCEDURE — 93306 TTE W/DOPPLER COMPLETE: CPT | Mod: 26,,, | Performed by: INTERNAL MEDICINE

## 2018-01-19 PROCEDURE — 85007 BL SMEAR W/DIFF WBC COUNT: CPT

## 2018-01-19 PROCEDURE — 20000000 HC ICU ROOM

## 2018-01-19 RX ORDER — HYDROCODONE BITARTRATE AND ACETAMINOPHEN 500; 5 MG/1; MG/1
TABLET ORAL CONTINUOUS
Status: DISCONTINUED | OUTPATIENT
Start: 2018-01-19 | End: 2018-01-20

## 2018-01-19 RX ORDER — MAGNESIUM SULFATE HEPTAHYDRATE 40 MG/ML
2 INJECTION, SOLUTION INTRAVENOUS
Status: DISCONTINUED | OUTPATIENT
Start: 2018-01-19 | End: 2018-01-20

## 2018-01-19 RX ORDER — LEVOTHYROXINE SODIUM ANHYDROUS 100 UG/5ML
100 INJECTION, POWDER, LYOPHILIZED, FOR SOLUTION INTRAVENOUS DAILY
Status: DISCONTINUED | OUTPATIENT
Start: 2018-01-19 | End: 2018-01-26

## 2018-01-19 RX ORDER — GLUCAGON 1 MG
1 KIT INJECTION
Status: DISCONTINUED | OUTPATIENT
Start: 2018-01-19 | End: 2018-01-26 | Stop reason: HOSPADM

## 2018-01-19 RX ORDER — HYDROCODONE BITARTRATE AND ACETAMINOPHEN 500; 5 MG/1; MG/1
TABLET ORAL
Status: DISCONTINUED | OUTPATIENT
Start: 2018-01-19 | End: 2018-01-26 | Stop reason: HOSPADM

## 2018-01-19 RX ORDER — LIDOCAINE HYDROCHLORIDE 10 MG/ML
5 INJECTION INFILTRATION; PERINEURAL ONCE
Status: DISCONTINUED | OUTPATIENT
Start: 2018-01-18 | End: 2018-01-21

## 2018-01-19 RX ORDER — INSULIN ASPART 100 [IU]/ML
1-10 INJECTION, SOLUTION INTRAVENOUS; SUBCUTANEOUS EVERY 6 HOURS PRN
Status: DISCONTINUED | OUTPATIENT
Start: 2018-01-19 | End: 2018-01-24

## 2018-01-19 RX ADMIN — HEPARIN SODIUM 5000 UNITS: 5000 INJECTION, SOLUTION INTRAVENOUS; SUBCUTANEOUS at 05:01

## 2018-01-19 RX ADMIN — SODIUM CHLORIDE: 0.9 INJECTION, SOLUTION INTRAVENOUS at 07:01

## 2018-01-19 RX ADMIN — LEVOTHYROXINE SODIUM ANHYDROUS 100 MCG: 100 INJECTION, POWDER, LYOPHILIZED, FOR SOLUTION INTRAVENOUS at 10:01

## 2018-01-19 RX ADMIN — DEXMEDETOMIDINE HYDROCHLORIDE 1 MCG/KG/HR: 100 INJECTION, SOLUTION INTRAVENOUS at 11:01

## 2018-01-19 RX ADMIN — INSULIN ASPART 4 UNITS: 100 INJECTION, SOLUTION INTRAVENOUS; SUBCUTANEOUS at 05:01

## 2018-01-19 RX ADMIN — HEPARIN SODIUM 5000 UNITS: 5000 INJECTION, SOLUTION INTRAVENOUS; SUBCUTANEOUS at 10:01

## 2018-01-19 RX ADMIN — CEFEPIME 1 G: 1 INJECTION, POWDER, FOR SOLUTION INTRAMUSCULAR; INTRAVENOUS at 01:01

## 2018-01-19 RX ADMIN — DEXMEDETOMIDINE HYDROCHLORIDE 1.4 MCG/KG/HR: 100 INJECTION, SOLUTION INTRAVENOUS at 01:01

## 2018-01-19 RX ADMIN — PROPOFOL 50 MCG/KG/MIN: 10 INJECTION, EMULSION INTRAVENOUS at 06:01

## 2018-01-19 RX ADMIN — METHYLPREDNISOLONE SODIUM SUCCINATE 40 MG: 40 INJECTION, POWDER, FOR SOLUTION INTRAMUSCULAR; INTRAVENOUS at 10:01

## 2018-01-19 RX ADMIN — DEXTROSE MONOHYDRATE 8 MG/HR: 5 INJECTION INTRAVENOUS at 08:01

## 2018-01-19 RX ADMIN — DEXMEDETOMIDINE HYDROCHLORIDE 1.4 MCG/KG/HR: 100 INJECTION, SOLUTION INTRAVENOUS at 06:01

## 2018-01-19 RX ADMIN — HEPARIN SODIUM 5000 UNITS: 5000 INJECTION, SOLUTION INTRAVENOUS; SUBCUTANEOUS at 01:01

## 2018-01-19 RX ADMIN — CIPROFLOXACIN 400 MG: 2 INJECTION, SOLUTION INTRAVENOUS at 01:01

## 2018-01-19 RX ADMIN — INSULIN ASPART 2 UNITS: 100 INJECTION, SOLUTION INTRAVENOUS; SUBCUTANEOUS at 11:01

## 2018-01-19 RX ADMIN — INSULIN ASPART 8 UNITS: 100 INJECTION, SOLUTION INTRAVENOUS; SUBCUTANEOUS at 05:01

## 2018-01-19 RX ADMIN — PROPOFOL 50 MCG/KG/MIN: 10 INJECTION, EMULSION INTRAVENOUS at 12:01

## 2018-01-19 RX ADMIN — METHYLPREDNISOLONE SODIUM SUCCINATE 40 MG: 40 INJECTION, POWDER, FOR SOLUTION INTRAMUSCULAR; INTRAVENOUS at 08:01

## 2018-01-19 RX ADMIN — AMIODARONE HYDROCHLORIDE: 50 INJECTION, SOLUTION INTRAVENOUS at 01:01

## 2018-01-19 RX ADMIN — INSULIN ASPART 2 UNITS: 100 INJECTION, SOLUTION INTRAVENOUS; SUBCUTANEOUS at 01:01

## 2018-01-19 RX ADMIN — PROPOFOL 40 MCG/KG/MIN: 10 INJECTION, EMULSION INTRAVENOUS at 04:01

## 2018-01-19 RX ADMIN — DEXTROSE MONOHYDRATE 8 MG/HR: 5 INJECTION INTRAVENOUS at 01:01

## 2018-01-19 RX ADMIN — PROPOFOL 50 MCG/KG/MIN: 10 INJECTION, EMULSION INTRAVENOUS at 11:01

## 2018-01-19 RX ADMIN — PROPOFOL 40 MCG/KG/MIN: 10 INJECTION, EMULSION INTRAVENOUS at 07:01

## 2018-01-19 RX ADMIN — SODIUM BICARBONATE: 84 INJECTION, SOLUTION INTRAVENOUS at 12:01

## 2018-01-19 RX ADMIN — NOREPINEPHRINE BITARTRATE 0.02 MCG/KG/MIN: 1 INJECTION INTRAVENOUS at 08:01

## 2018-01-19 NOTE — PROGRESS NOTES
Patient intubated was severe acidosis, hypercapnia, marked increase in creatinine, creatinine was 1.6 and February 2017 and 2.6 in August, patient required intubation, has been cultured and started on antibiotics, cefepime Cipro and vancomycin. Consult place to nephrology pulmonary; patient is on stress steroids and a bicarbonate drip. Nephrology considering dialysis. Some initial difficulty with IV access.    Doing somewhat better this am-remains acidotic, on vent, now back in NSR-a bit more stable    HEENT-atraumatic/normocephalic intubated  cv, tachycardia  BS few rales and rhonchi  ABD soft, NT  Ext no C C E    Diagnosis: shortness of breath, hypoxemia; acute respiratory failure, metabolic and respiratory combined acidosis, possible sepsis with septic shock    Disposition, patient remains critically ill, prognosis is guarded

## 2018-01-19 NOTE — PROGRESS NOTES
"ERNA Jesus is a 67 y.o. male patient.    Active Hospital Problems    Diagnosis  POA    *Metabolic acidosis [E87.2]  Yes    Lactic acid acidosis [E87.2]  Yes    Hyperkalemia [E87.5]  Yes    Hypothermia [T68.XXXA]  Yes    Paroxysmal tachycardia [I47.9]  Yes    Acute renal failure [N17.9]  Yes    Muscle wasting [M62.50]  Yes    Neutrophilic leukocytosis [D72.9]  Yes    Uremia [N19]  Yes    Encephalopathy, metabolic [G93.41]  Yes    Hyperphosphatemia [E83.39]  Yes    Elevated troponin [R74.8]  Yes      Resolved Hospital Problems    Diagnosis Date Resolved POA   No resolved problems to display.     Temp: 98.1 °F (36.7 °C) (01/19/18 1030)  Pulse: (!) 52 (01/19/18 1100)  Resp: (!) 0 (01/19/18 1100)  BP: (!) 132/58 (01/19/18 1100)  SpO2: 100 % (01/19/18 1100)  Weight: 68.6 kg (151 lb 3.8 oz) (01/18/18 1643)  Height: 5' 9" (175.3 cm) (01/18/18 1643)    Subjective:  Symptoms:  Stable.  (Ng with dark outpt  Son at bedside with fiancee).      Objective:  General Appearance:  Comfortable, ill-appearing, in no acute distress and not in pain.    Vital signs: (most recent): Blood pressure (!) 132/58, pulse (!) 52, temperature 98.1 °F (36.7 °C), temperature source Axillary, resp. rate (!) 0, height 5' 9" (1.753 m), weight 68.6 kg (151 lb 3.8 oz), SpO2 100 %.    HEENT: (+ett)    Lungs:  Breath sounds clear to auscultation.    Heart: Bradycardia.    Abdomen: Abdomen is soft.  Bowel sounds are normal.   There is no abdominal tenderness.     Extremities: There is no venous stasis or dependent edema.    Neurological: (Sedated).    Skin:  Dry and cool.      Intake/Output - Last 3 Shifts       01/17 0700 - 01/18 0659 01/18 0700 - 01/19 0659 01/19 0700 - 01/20 0659    I.V. (mL/kg)  3295.3 (48)     Blood  332 270    Other  1500     IV Piggyback  1650     Total Intake(mL/kg)  6777.3 (98.8) 270 (3.9)    Urine (mL/kg/hr)  300     Other  500     Total Output   800      Net   +5977.3 +270               Lab Results   Component " Value Date    WBC 25.51 (H) 01/19/2018    HGB 7.4 (L) 01/19/2018    HCT 22.5 (L) 01/19/2018    MCV 92 01/19/2018     01/19/2018     BMP  Lab Results   Component Value Date     01/19/2018    K 5.1 01/19/2018    CL 93 (L) 01/19/2018    CO2 8 (LL) 01/19/2018    BUN 25 (H) 01/19/2018    CREATININE 5.1 (H) 01/19/2018    CALCIUM 7.7 (L) 01/19/2018    ANIONGAP 36 (H) 01/19/2018    ESTGFRAFRICA 13 (A) 01/19/2018    EGFRNONAA 11 (A) 01/19/2018     Current Facility-Administered Medications   Medication    0.9%  NaCl infusion (CRRT USE ONLY)    0.9%  NaCl infusion (for blood administration)    0.9%  NaCl infusion    calcium gluconate 1g in dextrose 5% 100mL (ready to mix system)    calcium gluconate 1g in dextrose 5% 100mL (ready to mix system)    ceFEPIme injection 1 g    ciprofloxacin (CIPRO)400mg/200ml D5W IVPB 400 mg    dexmedetomidine (PRECEDEX) 400mcg/100mL 0.9% NaCL infusion    dextrose 5 % 500 mL with amiodarone 900 mg infusion    dextrose 50% injection 12.5 g    dextrose 50% injection 25 g    glucagon (human recombinant) injection 1 mg    heparin (porcine) injection 5,000 Units    heparin (porcine) injection 5,000 Units    insulin aspart pen 1-10 Units    levothyroxine injection 100 mcg    lidocaine HCL 10 mg/ml (1%) injection 5 mL    magnesium sulfate 2g in water 50mL IVPB (premix)    methylPREDNISolone sodium succinate injection 40 mg    norepinephrine 16 mg in dextrose 5 % 250 mL infusion    ondansetron injection 4 mg    pantoprazole 40 mg in dextrose 5 % 100 mL infusion (ready to mix system)    propofol (DIPRIVAN) 10 mg/mL infusion    sodium phosphate 30 mmol in dextrose 5 % 500 mL IVPB    sodium phosphate 39.99 mmol in dextrose 5 % 500 mL IVPB    vancomycin 1 g in 0.9% sodium chloride 250 mL IVPB (ready to mix system)       Assessment & Plan  1.SOURAV.   Poor UOP. Bolus.  Start crrt.  2.Lactic acidosis.  Starting on crrt. PH better. GIB? Perforation?  Ct scan pending. abd is  soft. Acid level down some.  3.Anemia 2nd to gib. S/p prbc. HG low .PRBC  As needed. PPI cont.  4.Acute resp failure.  Following volume. Cont vent support.  5.hypotension. Volume low. Bolus with fluids. Recommend weaning steroids if gib.  6.dm2. Glucose up. If continues to be up, will need to start insulin gtt. Will see if crrt helps.  7.Sepsis. ? Bowel. Consult surgery.  35mins critical care time. Did update son. Recommended DNR with continued tx. Pt with multiple organ failure. Survival overall not good.    Chandni Ragland MD  1/19/2018

## 2018-01-19 NOTE — HPI
ERNA Jesus is a 67 y.o. male that (in part)  has no past medical history on file. Presents to [facility] from [facility] for evaluation of rapid heart rate, tachypnea, and associated nausea and vomiting.  Unfortunately the patient is a poor historian and therefore the history is limited.  He was increasingly somnolent and had unintelligible speech.  He is chronically debilitated.     In the emergency department  routine laboratory studies, chest x-ray, EKG, cardiac enzymes were obtained.  There was immediate concern for significant metabolic acidosis with profound lactic acid acidosis, hypoperfusion, hypotension, hypothermia, hyperkalemia, hyperphosphatemia, and metabolic encephalopathy.  Nephrology was consulted emergently.  Central access was obtained and A dialysis catheter was placed.    EKG A-fib 72 IVCD    Currently intubated in ICU  Sinus bradycardia 51. Review of telemetry shows episodes of PAF with RVR

## 2018-01-19 NOTE — ASSESSMENT & PLAN NOTE
Transient paroxysmal tachycardia likely related to severe metabolic acidosis and hypotension.  Now rate controlled.  Continue monitoring on telemetry

## 2018-01-19 NOTE — EICU
eICU Note : Ventilator Rounds : Day1    On Mechanical ventilator :settings:  Vent Mode: PRVC  Oxygen Concentration (%):  [30-50] 30  Resp Rate Total:  [19 br/min-31 br/min] 31 br/min  Vt Set:  [450 mL] 450 mL  PEEP/CPAP:  [5 cmH20] 5 cmH20  Mean Airway Pressure:  [5.5 cmH20-6.1 cmH20] 6.1 cmH20     Last AB.32//181/11/-13    Sedation on/off: Demedetomidine and Propofol     Problem: New onset Afib with RVR    Pertinent History and labs reviewed :67-year-old male with  1. Acute metabolic Acidosis 2/Paroxysmal Tachycardia 3.ARF    Treatment /Intervention given:  Courtney Robert M.D  eICU Physician  6:45 AM  7.40/16/142/10/-13 on FiO2 28% and RR 24/mt

## 2018-01-19 NOTE — ASSESSMENT & PLAN NOTE
Uremia secondary to acute renal failure.  Likely contributory to metabolic encephalopathy.  Treatment with dialysis.  Evaluation for renal failure as noted above.

## 2018-01-19 NOTE — ASSESSMENT & PLAN NOTE
· As evidenced by severe metabolic acidosis and decrease in GFR.  Baseline creatinine = unknown baseline  · Will evaluate for pre-renal, intrarenal, and post-obstructive etiology.  · Obtain:  1.  protein/creatinine ratio  2. urine and serum osmolalities  3. urine electrolytes (Na, Cl, K)  4. LDH  5. Complement  6. Poole's stain for eosinophils  · Renal ultrasound  · Monitor with serial Cr / GFR levels closely with serial labs  · Avoid nephrotoxic medications such as NSAIDs, IV contrast, or RAAS blockade  · Nephrology consulted for emergent dialysis.  Thank you

## 2018-01-19 NOTE — H&P
[facility] Hospital Medicine  History & Physical    Patient Name: ERNA Jesus  MRN: [id number]  Admission Date: 01/19/2018  Attending Physician: Giancarlo Spangler MD, MPH      PCP:     No primary care provider on file.    CC:     Chief Complaint   Patient presents with    Atrial Fibrillation     Pt sent from [facility] with new onset of Afib; along with n/v        HISTORY OF PRESENT ILLNESS:     ERNA Jesus is a 67 y.o. male that (in part)  has no past medical history on file. Presents to [facility] Emergency Department from [facility] for evaluation of rapid heart rate, tachypnea, and associated nausea and vomiting.  Unfortunately the patient is a poor historian and therefore the history is limited.  He was increasingly somnolent and had unintelligible speech.  He is chronically debilitated.    In the emergency department  routine laboratory studies, chest x-ray, EKG, cardiac enzymes were obtained.  There was immediate concern for significant metabolic acidosis with profound lactic acid acidosis, hypoperfusion, hypotension, hypothermia, hyperkalemia, hyperphosphatemia, and metabolic encephalopathy.  Nephrology was consulted emergently.  Central access was obtained and A dialysis catheter was placed.    Hospital medicine has been asked to admit for further evaluation and treatment.       REVIEW OF SYSTEMS:     Unable to obtain due to metabolic encephalopathy    PAST MEDICAL / SURGICAL HISTORY:     Unable to obtain due to metabolic encephalopathy    FAMILY HISTORY:     Unable to obtain due to metabolic encephalopathy      SOCIAL HISTORY:     Social History   Substance Use Topics    Smoking status: Unknown If Ever Smoked    Smokeless tobacco: Not on file    Alcohol use Not on file     Social History     Social History    Marital status: Single     Spouse name: N/A    Number of children: N/A    Years of education: N/A     Social History Main  Topics    Smoking status: Unknown If Ever Smoked    Smokeless tobacco: None    Alcohol use None    Drug use: Unknown    Sexual activity: Not Asked     Other Topics Concern    None     Social History Narrative    None         ALLERGIES:       Review of patient's allergies indicates:  No Known Allergies      HEALTH SCREENING:     Influenza vaccine not up-to-date for this season.  Prevnar 13 pneumonia vaccine = no evidence of previous vaccination found in the medical record      HOME MEDICATIONS:     Prior to Admission medications    Not on File          HOSPITAL MEDICATIONS:     Scheduled Meds:    calcium gluconate IVPB  1,000 mg Intravenous Once    ceFEPime (MAXIPIME) IVPB  1 g Intravenous Q12H    ciprofloxacin  400 mg Intravenous Q24H    heparin (porcine)  5,000 Units Subcutaneous Q8H    levothyroxine  25 mcg Intravenous Daily    methylPREDNISolone sodium succinate  40 mg Intravenous Q12H    pantoprazole  40 mg Intravenous Daily     Continuous Infusions:    sodium chloride 0.9% 50 mL/hr at 01/19/18 0000    dexmedetomidine (PRECEDEX) infusion 1.3 mcg/kg/hr (01/19/18 0000)    custom IV infusion builder (for pharmacist use only) 33.3 mL/hr at 01/19/18 0000    custom IV infusion builder (for pharmacist use only)      norepinephrine bitartrate-D5W 0.25 mcg/kg/min (01/19/18 0000)    propofol 45.045 mcg/kg/min (01/19/18 0000)    sodium bicarbonate drip 150 mL/hr at 01/19/18 0000     PRN Meds: [COMPLETED] calcium gluconate IVPB **AND** calcium gluconate IVPB, [COMPLETED] dextrose 50% **AND** dextrose 50% **AND** [COMPLETED] insulin regular, heparin (porcine), ondansetron, propofol, vancomycin (VANCOCIN) IVPB      PHYSICAL EXAM:     Wt Readings from Last 1 Encounters:   01/18/18 1643 68.6 kg (151 lb 3.8 oz)   01/18/18 1128 70.3 kg (155 lb)     Body mass index is 22.33 kg/m².  Vitals:    01/18/18 2315 01/18/18 2330 01/18/18 2345 01/19/18 0000   BP: (!) 105/56 (!) 103/55 (!) 101/55 (!) 103/55   BP  Location:       Patient Position:       Pulse: 78 77 74 74   Resp: 18 18 19 20   Temp:       TempSrc:       SpO2: 100% 100% 100% 100%   Weight:       Height:              -- General appearance: Critically ill-appearing cachectic upper middle-aged male who is  well developed. Appears older than stated age   -- Head: normocephalic, atraumatic   -- Eyes: conjunctivae clear. Extraocular muscles intact  -- Nose: Nares normal. Septum midline.   -- Mouth/Throat: Dry oral mucosa. no throat erythema.   -- Neck: supple, symmetrical, trachea midline, no JVD and thyroid not grossly enlarged, appears symmetric  -- Lungs: clear to auscultation bilaterally. normal respiratory effort. No use of accessory muscles.   -- Chest wall: no tenderness. equal bilateral chest rise   -- Heart: regular rate and regular rhythm. S1, S2 normal.  no click, rub or gallop   -- Abdomen: soft, non-tender, non-distended, non-tympanic; bowel sounds normal; no masses  -- Extremities: Diffusely decreased muscle mass.  no cyanosis, clubbing or edema.   -- Pulses: 2+ and symmetric   -- Skin:  Pale color, texture normal, No rashes. decreased skin turgor.   Right foot lesions; healing.   SKin excoriations.   -- Neurologic: +Encephalopathic.  Globally decreased muscle strength  and tone. No focal numbness or weakness.     LABORATORY STUDIES:     Recent Results (from the past 36 hour(s))   POCT glucose    Collection Time: 01/18/18 12:11 PM   Result Value Ref Range    POCT Glucose 138 (H) 70 - 110 mg/dL   CBC auto differential    Collection Time: 01/18/18 12:25 PM   Result Value Ref Range    WBC 20.56 (H) 3.90 - 12.70 K/uL    RBC 3.40 (L) 4.60 - 6.20 M/uL    Hemoglobin 10.1 (L) 14.0 - 18.0 g/dL    Hematocrit 33.2 (L) 40.0 - 54.0 %    MCV 98 82 - 98 fL    MCH 29.7 27.0 - 31.0 pg    MCHC 30.4 (L) 32.0 - 36.0 g/dL    RDW 13.2 11.5 - 14.5 %    Platelets 673 (H) 150 - 350 K/uL    MPV 10.3 9.2 - 12.9 fL    Lymph # CANCELED 1.0 - 4.8 K/uL    Mono # CANCELED 0.3 - 1.0  K/uL    Eos # CANCELED 0.0 - 0.5 K/uL    Baso # CANCELED 0.00 - 0.20 K/uL    Gran% 79.0 (H) 38.0 - 73.0 %    Lymph% 5.0 (L) 18.0 - 48.0 %    Mono% 7.0 4.0 - 15.0 %    Eosinophil% 0.0 0.0 - 8.0 %    Basophil% 1.0 0.0 - 1.9 %    Bands 7.0 %    Myelocytes 1.0 %    Platelet Estimate Increased (A)     Toxic Granulation Present     Differential Method Manual    Comprehensive metabolic panel    Collection Time: 01/18/18 12:25 PM   Result Value Ref Range    Sodium 141 136 - 145 mmol/L    Potassium 7.4 (HH) 3.5 - 5.1 mmol/L    Chloride 106 95 - 110 mmol/L    CO2 <5 (LL) 23 - 29 mmol/L    Glucose 122 (H) 70 - 110 mg/dL    BUN, Bld 57 (H) 8 - 23 mg/dL    Creatinine 12.1 (H) 0.5 - 1.4 mg/dL    Calcium 9.9 8.7 - 10.5 mg/dL    Total Protein 6.6 6.0 - 8.4 g/dL    Albumin 3.2 (L) 3.5 - 5.2 g/dL    Total Bilirubin 0.5 0.1 - 1.0 mg/dL    Alkaline Phosphatase 113 55 - 135 U/L    AST 12 10 - 40 U/L    ALT 7 (L) 10 - 44 U/L    Anion Gap Unable to calculate 8 - 16 mmol/L    eGFR if African American 4 (A) >60 mL/min/1.73 m^2    eGFR if non African American 4 (A) >60 mL/min/1.73 m^2   Troponin I    Collection Time: 01/18/18 12:25 PM   Result Value Ref Range    Troponin I 0.278 (H) 0.000 - 0.026 ng/mL   B-Type natriuretic peptide (BNP)    Collection Time: 01/18/18 12:25 PM   Result Value Ref Range     (H) 0 - 99 pg/mL   Blood culture    Collection Time: 01/18/18  1:15 PM   Result Value Ref Range    Blood Culture, Routine No Growth to date    Lactic acid, plasma    Collection Time: 01/18/18  1:30 PM   Result Value Ref Range    Lactate (Lactic Acid) >12.0 (HH) 0.5 - 2.2 mmol/L   Blood culture    Collection Time: 01/18/18  1:30 PM   Result Value Ref Range    Blood Culture, Routine No Growth to date    ISTAT PROCEDURE    Collection Time: 01/18/18  2:17 PM   Result Value Ref Range    POC PH 6.630 (LL) 7.35 - 7.45    POC PCO2 14.5 (LL) 35 - 45 mmHg    POC PO2 192 (H) 80 - 100 mmHg    POC HCO3 1.5 (L) 24 - 28 mmol/L    POC BE <-30 (L) -2 to  2 mmol/L    POC SATURATED O2 97 95 - 100 %    POC TCO2 <5 (L) 23 - 27 mmol/L    Sample ARTERIAL     Site LR     Allens Test Pass     DelSys Nasal Can     Mode SPONT     Flow 4     Sp02 99    Urinalysis    Collection Time: 01/18/18  2:29 PM   Result Value Ref Range    Specimen UA Urine, Catheterized     Color, UA Yellow Yellow, Straw, Chrissy    Appearance, UA Cloudy (A) Clear    pH, UA 5.0 5.0 - 8.0    Specific Gravity, UA 1.010 1.005 - 1.030    Protein, UA 1+ (A) Negative    Glucose, UA Negative Negative    Ketones, UA Negative Negative    Bilirubin (UA) Negative Negative    Occult Blood UA Negative Negative    Nitrite, UA Negative Negative    Urobilinogen, UA Negative <2.0 EU/dL    Leukocytes, UA Negative Negative   Urinalysis Microscopic    Collection Time: 01/18/18  2:29 PM   Result Value Ref Range    RBC, UA 1 0 - 4 /hpf    WBC, UA 3 0 - 5 /hpf    Bacteria, UA Few (A) None-Occ /hpf    Squam Epithel, UA Occasional /hpf    Hyaline Casts, UA 1 0-1/lpf /lpf    Ca Oxalate Alicia, UA Occasional None-Moderate    Microscopic Comment SEE COMMENT    Magnesium    Collection Time: 01/18/18  2:30 PM   Result Value Ref Range    Magnesium 2.3 1.6 - 2.6 mg/dL   TSH    Collection Time: 01/18/18  2:30 PM   Result Value Ref Range    TSH 10.755 (H) 0.400 - 4.000 uIU/mL   Phosphorus    Collection Time: 01/18/18  2:30 PM   Result Value Ref Range    Phosphorus 14.4 (HH) 2.7 - 4.5 mg/dL   T4, free    Collection Time: 01/18/18  2:30 PM   Result Value Ref Range    Free T4 0.71 0.71 - 1.51 ng/dL   POCT glucose    Collection Time: 01/18/18  2:35 PM   Result Value Ref Range    POCT Glucose 278 (H) 70 - 110 mg/dL   ISTAT PROCEDURE    Collection Time: 01/18/18  4:00 PM   Result Value Ref Range    POC PH 6.630 (LL) 7.35 - 7.45    POC PCO2 10.0 (LL) 35 - 45 mmHg    POC PO2 189 (H) 80 - 100 mmHg    POC HCO3 1.1 (L) 24 - 28 mmol/L    POC BE <-30 (L) -2 to 2 mmol/L    POC SATURATED O2 97 95 - 100 %    POC TCO2 <5 (L) 23 - 27 mmol/L    Sample ARTERIAL      Site LR     Allens Test Pass     DelSys Nasal Can     Mode SPONT     Flow 4     Sp02 100    POCT glucose    Collection Time: 01/18/18  4:37 PM   Result Value Ref Range    POCT Glucose 310 (H) 70 - 110 mg/dL   ISTAT PROCEDURE    Collection Time: 01/18/18  5:13 PM   Result Value Ref Range    POC PH 6.818 (LL) 7.35 - 7.45    POC PCO2 20.9 (LL) 35 - 45 mmHg    POC PO2 358 (H) 80 - 100 mmHg    POC HCO3 3.4 (L) 24 - 28 mmol/L    POC BE -29 -2 to 2 mmol/L    POC SATURATED O2 100 95 - 100 %    POC TCO2 <5 (L) 23 - 27 mmol/L    Rate 18     Sample ARTERIAL     Site RR     Allens Test Pass     DelSys Adult Vent     Mode AC/PRVC     Vt 450     PEEP 5     FiO2 50    ISTAT PROCEDURE    Collection Time: 01/18/18  7:54 PM   Result Value Ref Range    POC PH 7.320 (L) 7.35 - 7.45    POC PCO2 22.0 (LL) 35 - 45 mmHg    POC PO2 181 (H) 80 - 100 mmHg    POC HCO3 11.3 (L) 24 - 28 mmol/L    POC BE -13 -2 to 2 mmol/L    POC SATURATED O2 100 95 - 100 %    POC TCO2 12 (L) 23 - 27 mmol/L    Rate 18     Sample ARTERIAL     Site LR     Allens Test Pass     DelSys Adult Vent     Mode AC/PRVC     Vt 450     PEEP 5     FiO2 0.30     Min Vol 9.8     Sp02 100    POCT glucose    Collection Time: 01/18/18  8:11 PM   Result Value Ref Range    POCT Glucose 219 (H) 70 - 110 mg/dL   POCT glucose    Collection Time: 01/18/18 10:15 PM   Result Value Ref Range    POCT Glucose 232 (H) 70 - 110 mg/dL       No results found for: INR, PROTIME  No results found for: HGBA1C  Recent Labs      01/18/18   1211  01/18/18   1435  01/18/18   1637  01/18/18 2011 01/18/18   2215   POCTGLUCOSE  138*  278*  310*  219*  232*           MICROBIOLOGY DATA:     No results found for: LABGENI, LABREFE, LABUPPE, LABURIN, LABAFB    Microbiology x 7d:   Microbiology Results (last 7 days)     Procedure Component Value Units Date/Time    Blood culture [[id number]] Collected:  01/18/18 1330    Order Status:  Completed Specimen:  Blood from Peripheral, Antecubital, Left Updated:   01/18/18 2112     Blood Culture, Routine No Growth to date    Blood culture [[id number]] Collected:  01/18/18 1315    Order Status:  Completed Specimen:  Blood from Peripheral, Antecubital, Left Updated:  01/18/18 2112     Blood Culture, Routine No Growth to date    Urine culture [[id number]] Collected:  01/18/18 1429    Order Status:  Sent Specimen:  Urine from Urine, Catheterized Updated:  01/18/18 1433            IMAGING:     Imaging Results          US Abdomen Complete (Final result)  Result time 01/18/18 21:54:57    Final result by Floyd Schmidt MD (01/18/18 21:54:57)                 Impression:      No acute process seen.  No evidence of hydronephrosis.    Nonvisualization of the gallbladder, suspected cholecystectomy.          Electronically signed by: FLOYD SCHMIDT MD  Date:     01/18/18  Time:    21:54              Narrative:    Time of Procedure: 01/18/18 21:00:44  Accession # [id number]    Reason for study: 67-year-old male with renal failure.    Comparison: None.    Technique: Abdominal ultrasound was performed.    Findings: The liver is normal in size measuring 12.8cm.  Hepatic parenchyma is homogeneous without evidence for masses.  No intra- or extrahepatic biliary ductal dilatation. The common bile duct measures 0.4 cm.  The gallbladder is not visualized. Suspected shadowing cholecystectomy clips are seen in the region of the gallbladder fossa.  The visualized portions of the pancreas, aorta and IVC appear normal. The kidneys measure 11.2 cm on the right and 10.2 cm on the left.  No evidence of hydronephrosis.  The spleen is normal in size and measures 8.3 cm. No ascites.                             X-Ray Chest 1 View (Final result)  Result time 01/18/18 16:47:40    Final result by Eusebio Johnson MD (01/18/18 16:47:40)                 Impression:     Interval intubation and central line placement.      No acute cardiopulmonary disease.      Electronically signed by: EUSEBIO JOHNSON MD  Date:      01/18/18  Time:    16:47              Narrative:    Portable AP view of the chest was obtained.  Comparison -- 1/18 at 1153.  Right jugular catheter has been placed with tip in SVC.  No pneumothorax post placement.  Endotracheal tube has been placed with tip 4 cm above melissa.  Gastric tube has also been placed with tip in the stomach .  The heart size is normal and mediastinal structures are midline.  Lungs are expanded and clear.  No lung consolidation or pleural fluid is identified.    Visualized upper abdomen shows prominent gas in the stomach and small bowel.                             X-Ray Chest 1 View (Final result)  Result time 01/18/18 12:18:18    Final result by Ubaldo Asher MD (01/18/18 12:18:18)                 Impression:        No acute radiographic findings in the chest.      Electronically signed by: UBALDO ASHER MD  Date:     01/18/18  Time:    12:18              Narrative:    Comparison: None    Technique: Chest single view    Findings: The cardiac silhouette and pulmonary vascularity are within normal limits.  The lungs are symmetrically expanded without evidence of significant focal consolidation, effusion, or pneumothorax.  The bones demonstrate no acute abnormality.There is aortic atherosclerosis.  There is gaseous distention of the stomach.  Bones demonstrate degenerative changes.                                CONSULTS:     IP CONSULT TO NEPHROLOGY  IP CONSULT TO PULMONOLOGY  IP CONSULT TO CARDIOLOGY  IP CONSULT TO INFECTIOUS DISEASES  IP CONSULT TO GI       ASSESSMENT & PLAN:     Primary Diagnosis:  Metabolic acidosis    Active Hospital Problems    Diagnosis  POA    *Metabolic acidosis [E87.2]  Yes     Priority: 1 - High    Lactic acid acidosis [E87.2]  Yes     Priority: 2     Hyperkalemia [E87.5]  Yes     Priority: 3     Hypothermia [T68.XXXA]  Yes     Priority: 4     Acute renal failure [N17.9]  Yes     Priority: 5     Neutrophilic leukocytosis [D72.9]  Yes      Priority: 6     Paroxysmal tachycardia [I47.9]  Yes     Priority: 7     Encephalopathy, metabolic [G93.41]  Yes     Priority: 8     Muscle wasting [M62.50]  Yes    Uremia [N19]  Yes    Hyperphosphatemia [E83.39]  Yes    Elevated troponin [R74.8]  Yes      Resolved Hospital Problems    Diagnosis Date Resolved POA   No resolved problems to display.         Metabolic acidosis  · Profound metabolic acidosis secondary to lactic acidosis secondary to multifactorial etiology including acute renal failure,  hypoperfusion and tachycardia.  Infectious encephalopathy?  Empiric antibiotics initiated.  · Required emergent dialysis for correction of severe acid-base disturbance  · Sodium bicarbonate drip  · Nephrology consulted  · Postdialysis laboratory studies pending  · Follow with frequent BMPs and repeat lactic acid level      Lactic acid acidosis  · Severe lactic acid acidosis with initial measurement greater than 12 mmol per liter  · Correction with dialysis and correction of hypotension and hypothermia  · Due to severe sepsis?  No obvious infectious etiology identified.  Empiric antibiotics initiated as noted above.  Cultures pending.  · Also with acute renal failure    Hyperkalemia  · Mildly elevated  · Monitor with serial labs  · No evidence of EKG changes / peaked Ts  · Monitor on telemetry          Acute renal failure  · As evidenced by severe metabolic acidosis and decrease in GFR.  Baseline creatinine = unknown baseline  · Will evaluate for pre-renal, intrarenal, and post-obstructive etiology.  · Obtain:  1.  protein/creatinine ratio  2. urine and serum osmolalities  3. urine electrolytes (Na, Cl, K)  4. LDH  5. Complement  6. Poole's stain for eosinophils  · Renal ultrasound  · Monitor with serial Cr / GFR levels closely with serial labs  · Avoid nephrotoxic medications such as NSAIDs, IV contrast, or RAAS blockade  · Nephrology consulted for emergent dialysis.  Thank you    Neutrophilic  leukocytosis  Unknown etiology but concerning for sepsis.  Bacteremia?  No obvious infectious source identified at this time.  Cultures pending.    Paroxysmal tachycardia  Transient paroxysmal tachycardia likely related to severe metabolic acidosis and hypotension.  Now rate controlled.  Continue monitoring on telemetry    Encephalopathy, metabolic  · Secondary to multifactorial etiology including severe metabolic acidosis as well as possible acute infectious process as outlined above  · Treatment as above  · Supportive care  · Fall precautions        Elevated troponin    Elevated troponin likely secondary to cardiac strain from severe hypotension and tachycardia.  Also likely related to acute renal failure.  No evidence of ST elevation MI.  Continue to monitor on telemetry.  Trend troponin level.    Hyperphosphatemia    Hyperphosphatemia secondary to acute renal failure as noted above.  Status post dialysis.  Consider adding phosphate binder.  Will defer to nephrology.    Muscle wasting    PT OT consult to evaluate for physical deconditioning    Uremia    Uremia secondary to acute renal failure.  Likely contributory to metabolic encephalopathy.  Treatment with dialysis.  Evaluation for renal failure as noted above.        VTE Risk Mitigation         Ordered     heparin (porcine) injection 5,000 Units  Every 8 hours     Route:  Subcutaneous        01/18/18 1551     Place sequential compression device  Until discontinued      01/18/18 2016     heparin (porcine) injection 5,000 Units  As needed (PRN)     Route:  Intra-Catheter        01/18/18 1757     Medium Risk of VTE  Once      01/18/18 1551            Adult PRN medications available   DVT prophylaxis given       DISPOSITION:     Will admit to the Hospital Medicine service for further evaluation and treatment.    Chart reviewed and updated where applicable.    High Risk Conditions:  Patient has a condition that poses threat to life and bodily function: Acute Renal  Failure and multiple organ dysfunction syndrome         MDM   Reviewed: previous chart and vitals  Reviewed previous: labs, x-ray, and ultrasound  Interpretation: labs, x-ray, and ultrasound     Total Critical Care time: 35 minutes. This excludes time spent performing separately reportable procedures and services.  Counseling/Conference Time: 15 minutes          ===============================================================    Giancarlo Spangler MD, MPH  Department of Hospital Medicine   [facility]  [phone] pg  (7pm - 6am)          This note is dictated using Dragon Medical 360 voice recognition software.  There are word recognition mistakes that are occasionally missed on review.

## 2018-01-19 NOTE — ASSESSMENT & PLAN NOTE
· Profound metabolic acidosis secondary to lactic acidosis secondary to multifactorial etiology including acute renal failure,  hypoperfusion and tachycardia.  Infectious encephalopathy?  Empiric antibiotics initiated.  · Required emergent dialysis for correction of severe acid-base disturbance  · Sodium bicarbonate drip  · Nephrology consulted  · Postdialysis laboratory studies pending  · Follow with frequent BMPs and repeat lactic acid level

## 2018-01-19 NOTE — PLAN OF CARE
01/19/18 0937   Discharge Assessment   Assessment Type Discharge Planning Assessment   Confirmed/corrected address and phone number on facesheet? Yes   Assessment information obtained from? Medical Record;Caregiver  (Lizzie Herman)   Prior to hospitilization cognitive status: Alert/Oriented   Prior to hospitalization functional status: Assistive Equipment;Needs Assistance   Current cognitive status: Coma/Sedated/Intubated   Facility Arrived From: [facility]   Lives With facility resident   Who are your caregiver(s) and their phone number(s)? [phone] cary Garcia    Patient's perception of discharge disposition other (comments)  (Return to Nursing Home)   Readmission Within The Last 30 Days no previous admission in last 30 days   Equipment Currently Used at Home (Facility provides)   Do you have any problems affording any of your prescribed medications? No   Is the patient taking medications as prescribed? yes   Does the patient have transportation home? Yes   Discharge Plan A Other  (Pt currently on Mechanical Ventilator settings. Prognosis is poor.)   Discharge Plan B Return to Nursing Home   Patient/Family In Agreement With Plan yes

## 2018-01-19 NOTE — NURSING
Dr Medeiros at bedside to place dialysis catheter. OGT placed and bloody secretions on return. RIJ TLC in place unconfirmed by Xray. Radiology dept at bedside to confirm all lines placement. Arnaldo placed to right femoral site without complication per Dr. Medeiros. Pt sedated on propofol for procedure.

## 2018-01-19 NOTE — ASSESSMENT & PLAN NOTE
· Secondary to multifactorial etiology including severe metabolic acidosis as well as possible acute infectious process as outlined above  · Treatment as above  · Supportive care  · Fall precautions

## 2018-01-19 NOTE — H&P
[facility]  History & Physical    SUBJECTIVE:     Chief Complaint/Reason for Admission: Encephalopathy    History of Present Illness:  Patient is a 67 y.o. male presents from [facility] with encephalopathy.   According to the records the patient had been with nausea and vomiting.  The was no history of bleeding,abdomen pain or diarrhea or possible aspiration. In the ED the patient was found to be apparent New A-Fib. He was hypothermic with ARF and severe Metabolic acidosis with a CO2 of 5.     No prescriptions prior to admission.       Review of patient's allergies indicates:  No Known Allergies    History reviewed. No pertinent past medical history.  History reviewed. No pertinent surgical history.  History reviewed. No pertinent family history.  Social History   Substance Use Topics    Smoking status: Unknown If Ever Smoked    Smokeless tobacco: Not on file    Alcohol use Not on file        Review of Systems:  Review of systems not obtained due to patient factors pt with confusion.    OBJECTIVE:     Vital Signs (Most Recent):  Temp: 98.1 °F (36.7 °C) (01/18/18 2315)  Pulse: 69 (01/19/18 0245)  Resp: (!) 24 (01/19/18 0245)  BP: (!) 108/56 (01/19/18 0245)  SpO2: 100 % (01/19/18 0245)    Physical Exam:  General: mild distress, pale  Eyes: conjunctivae/corneas clear. PERRL.   Neck: supple, symmetrical, trachea midline, no JVD and thyroid not enlarged, symmetric, no tenderness/mass/nodules  Lungs:  clear to auscultation bilaterally and normal respiratory effort  Cardiovascular: Heart: regular rate and rhythm, S1, S2 normal, no murmur, click, rub or gallop. Chest Wall: no tenderness. Extremities: no cyanosis or edema, or clubbing. Pulses: 2+ and symmetric.  Abdomen/Rectal: Abdomen: soft, non-tender non-distented; bowel sounds normal; no masses,  no organomegaly. Rectal: not examined  Musculoskeletal:JESSIKA  Neurologic: Mental status: intubated sedated    Laboratory:  CBC:   Recent  Labs  Lab 01/18/18  1225   WBC 20.56*   RBC 3.40*   HGB 10.1*   HCT 33.2*   *   MCV 98   MCH 29.7   MCHC 30.4*     BMP:   Recent Labs  Lab 01/18/18  1225 01/18/18  1430   *  --      --    K 7.4*  --      --    CO2 <5*  --    BUN 57*  --    CREATININE 12.1*  --    CALCIUM 9.9  --    MG  --  2.3     CMP:   Recent Labs  Lab 01/18/18  1225   *   CALCIUM 9.9   ALBUMIN 3.2*   PROT 6.6      K 7.4*   CO2 <5*      BUN 57*   CREATININE 12.1*   ALKPHOS 113   ALT 7*   AST 12   BILITOT 0.5     LFTs:   Recent Labs  Lab 01/18/18  1225   ALT 7*   AST 12   ALKPHOS 113   BILITOT 0.5   PROT 6.6   ALBUMIN 3.2*     Coagulation: No results for input(s): LABPROT, INR, APTT in the last 168 hours.  Cardiac markers:   Recent Labs  Lab 01/18/18  1225   TROPONINI 0.278*     ABGs:   Recent Labs  Lab 01/19/18  0017   PH 7.327*   PCO2 16.0*   PO2 145*   HCO3 8.4*   POCSATURATED 99   BE -16     Microbiology Results (last 7 days)     Procedure Component Value Units Date/Time    Blood culture [[id number]] Collected:  01/18/18 1330    Order Status:  Completed Specimen:  Blood from Peripheral, Antecubital, Left Updated:  01/18/18 2112     Blood Culture, Routine No Growth to date    Blood culture [[id number]] Collected:  01/18/18 1315    Order Status:  Completed Specimen:  Blood from Peripheral, Antecubital, Left Updated:  01/18/18 2112     Blood Culture, Routine No Growth to date    Urine culture [[id number]] Collected:  01/18/18 1429    Order Status:  Sent Specimen:  Urine from Urine, Catheterized Updated:  01/18/18 1433        Specimen (12h ago through future)    None          Recent Labs  Lab 01/18/18  1429   COLORU Yellow   SPECGRAV 1.010   PHUR 5.0   PROTEINUA 1+*   BACTERIA Few*   NITRITE Negative   LEUKOCYTESUR Negative   UROBILINOGEN Negative   HYALINECASTS 1       Diagnostic Results:  Labs: Reviewed  ECG: Reviewed  X-Ray: Reviewed  reviewed    ASSESSMENT/PLAN:     1. Shock -Probable Septic Shock vs  profound dehydration with ARF  2. Acute renal failure most likely s/t infection or Dehydration  3. Severe Metabolic acidosis - s/t renal failure  4. A-Fib - rate now controlled -consult cards  5. Troponin elevated - will trend out and consult cards maybe s/t renal impairment  6. Essential HTN - Hypotensive now with sepsis  7. Hyperkalemia - s/t acute renal failure - Nephrology following emergent HD  8. Normocytic Anemia - will investigate  9. Hypothermia - s/t Shock  10. H/O T2DM  11. Leukocytosis - investigate - probable sepsis vs reactive. Favoring sepsis  12. Hypothyroidism by history.    Plan: Continue Current. Avoid nephrotoxic meds

## 2018-01-19 NOTE — NURSING
Family  Called in for update and questions answered briefly. Encouraged to call in later on once pt is more stable.

## 2018-01-19 NOTE — ASSESSMENT & PLAN NOTE
· Severe lactic acid acidosis with initial measurement greater than 12 mmol per liter  · Correction with dialysis and correction of hypotension and hypothermia  · Due to severe sepsis?  No obvious infectious etiology identified.  Empiric antibiotics initiated as noted above.  Cultures pending.  · Also with acute renal failure

## 2018-01-19 NOTE — ASSESSMENT & PLAN NOTE
Appears to be PAF, currently in sinus bradycardia. Rhythm likely aggravated by electrolyte abnormalities. Check echo. Off of amiodarone now. No anticoagulation with anemia

## 2018-01-19 NOTE — ASSESSMENT & PLAN NOTE
Elevated troponin likely secondary to cardiac strain from severe hypotension and tachycardia.  Also likely related to acute renal failure.  No evidence of ST elevation MI.  Continue to monitor on telemetry.  Trend troponin level.

## 2018-01-19 NOTE — CONSULTS
[facility]  Pulmonology  Consult Note    Patient Name: ERNA Jesus  MRN: [id number]  Admission Date: 1/18/2018  Hospital Length of Stay: 1 days  Code Status: Full Code  Attending Physician: Giancarlo Cm MD  Primary Care Provider: No primary care provider on file.   Principal Problem: Metabolic acidosis    Inpatient consult to Pulmonology  Consult performed by: MARGARITA COLBERT  Consult ordered by: ИВАН RUSH        Subjective:     HPI:  67 year old man presenting from nursing home with afib with RVR , tachypnea, and N/V and found to have significant metabolic acidosis.   Labs on admission were significant for a lactic acid>12, creatinine of 12, as well as anemia, and leukocytosis. He was also noted to be hypothermic and hypotensive.    History reviewed. No pertinent past medical history.    History reviewed. No pertinent surgical history.    Review of patient's allergies indicates:  No Known Allergies    Family History     None        Social History Main Topics    Smoking status: Unknown If Ever Smoked    Smokeless tobacco: Not on file    Alcohol use Not on file    Drug use: Unknown    Sexual activity: Not on file         Review of Systems   Unable to perform ROS: Acuity of condition     Objective:     Vital Signs (Most Recent):  Temp: 98.1 °F (36.7 °C) (01/19/18 1030)  Pulse: (!) 52 (01/19/18 1100)  Resp: (!) 0 (01/19/18 1100)  BP: (!) 132/58 (01/19/18 1100)  SpO2: 100 % (01/19/18 1100) Vital Signs (24h Range):  Temp:  [89.1 °F (31.7 °C)-99.2 °F (37.3 °C)] 98.1 °F (36.7 °C)  Pulse:  [] 52  Resp:  [0-47] 0  SpO2:  [98 %-100 %] 100 %  BP: ()/() 132/58     Weight: 68.6 kg (151 lb 3.8 oz)  Body mass index is 22.33 kg/m².      Intake/Output Summary (Last 24 hours) at 01/19/18 1127  Last data filed at 01/19/18 1015   Gross per 24 hour   Intake          7047.27 ml   Output              800 ml   Net          6247.27 ml       Physical Exam   Constitutional: He  appears well-developed and well-nourished.   HENT:   Head: Normocephalic and atraumatic.   Eyes:   Pupils are equal   Neck: Normal range of motion. No tracheal deviation present.   Cardiovascular: Normal rate and regular rhythm.    No murmur heard.  Pulmonary/Chest: Effort normal and breath sounds normal. He has no wheezes. He has no rales.   Abdominal: Soft. Bowel sounds are normal. He exhibits no distension. There is no tenderness.   Musculoskeletal: He exhibits no edema.   Neurological:   Sedated with propofol and precedex   Skin: Skin is warm and dry.   Psychiatric: He has a normal mood and affect. His behavior is normal.   Vitals reviewed.      Vents:  Vent Mode: PRVC (01/19/18 0944)  Ventilator Initiated: Yes (01/18/18 1619)  Set Rate: 24 bmp (01/19/18 0944)  Vt Set: 450 mL (01/19/18 0944)  PEEP/CPAP: 5 cmH20 (01/19/18 0944)  Oxygen Concentration (%): 28 (01/19/18 0944)  Peak Airway Pressure: 19 cmH2O (01/19/18 0944)  Total Ve: 11.2 mL (01/19/18 0944)  F/VT Ratio<105 (RSBI): (!) 0 (01/19/18 0944)    Lines/Drains/Airways     Central Venous Catheter Line                 Hemodialysis Catheter 01/18/18 1707 right femoral less than 1 day         Percutaneous Central Line Insertion/Assessment - triple lumen  01/18/18 1500 right internal jugular less than 1 day          Drain                 NG/OG Tube 01/18/18 1708 [place] sump 16 Fr. Center mouth less than 1 day         Urethral Catheter 01/18/18 1200 less than 1 day          Airway                 Airway - Non-Surgical 01/18/18 1615 Endotracheal Tube less than 1 day          Peripheral Intravenous Line                 Peripheral IV - Double Lumen 01/18/18 1413 Right Wrist less than 1 day         Peripheral IV - Single Lumen 01/18/18 1430 Left Forearm less than 1 day                Significant Labs:    CBC/Anemia Profile:    Recent Labs  Lab 01/18/18  1225 01/19/18  0307 01/19/18  0424   WBC 20.56* 29.73* 25.51*   HGB 10.1* 7.9* 7.4*   HCT 33.2* 24.1* 22.5*   PLT  673* 363* 342   MCV 98 93 92   RDW 13.2 13.2 13.3   IRON  --  84  --    FERRITIN  --  723*  --    RETIC  --  1.8  --         Chemistries:    Recent Labs  Lab 01/18/18  1225 01/18/18  1430 01/19/18  0307     --  137   K 7.4*  --  5.1     --  93*   CO2 <5*  --  8*   BUN 57*  --  25*   CREATININE 12.1*  --  5.1*   CALCIUM 9.9  --  7.7*   ALBUMIN 3.2*  --   --    PROT 6.6  --   --    BILITOT 0.5  --   --    ALKPHOS 113  --   --    ALT 7*  --   --    AST 12  --   --    MG  --  2.3  --    PHOS  --  14.4*  --        All pertinent labs within the past 24 hours have been reviewed.    Significant Imaging:   I have reviewed and interpreted all pertinent imaging results/findings within the past 24 hours.    Assessment/Plan:     * Metabolic acidosis    As above.        On mechanically assisted ventilation    Currently intubated in the setting of respiratory failure 2/2  Profound  Metabolic acidosis. Etiology of this event is very unclear.  Agree with broad spectrum antibiotic coverage while awaiting culture data.   ABG this morning shows aggressive respiratory compensation: 7.4/15/142  Lactic acid is improving (7 this morning) and patient is due for CRRT today for further acid-base adjustments.  Maintain tidal volume of 450  Will decrease respiratory rate to 16 (patient currently riding the vent due to sedation)    Requested SAT and propofol is currently on hold.  Would discontinue precedex as patient is now bradycardic (and also on amiodarone)  If bradycardia does not improve--> would likely d/c amiodarone as well.    Hypotension has improved.  MAP is 75mmHg (Goal MAP=65mmHg).  Discontinue norepinephrine.    Afib with RVR: load with amio: now bradycardic with rate in the low 50's. D/c predex as above.  Troponins: 0.28-1.9. Cardiology consulted and evaluating the patient.         Acute respiratory failure    As above.        Lactic acid acidosis    As above.           Critical Care Time: 40 minutes  Critical care was  time spent personally by me on the following activities: evaluating this patient's organ dysfunction, development of treatment plan, discussing treatment plan with patient or surrogate and bedside caregivers, discussions with consultants, evaluation of patient's response to treatment, examination of patient, ordering and performing treatments and interventions, ordering and review of laboratory studies, ordering and review of radiographic studies, re-evaluation of patient's condition. This critical care time did not overlap with that of any other provider or involve time for any procedures.        Thank you for your consult. I will follow-up with patient. Please contact us if you have any additional questions.     Shelbie Tirado MD  Pulmonology  [facility]

## 2018-01-19 NOTE — PROGRESS NOTES
Pt received intubated and on the servo-i vent. With the following settings: PRVC 24/ 450/ +5/28%. AMBU bag is at the HOB, All alarms are set and functioning. Will continue to monitor and wean as tolerated.

## 2018-01-19 NOTE — ASSESSMENT & PLAN NOTE
Hyperphosphatemia secondary to acute renal failure as noted above.  Status post dialysis.  Consider adding phosphate binder.  Will defer to nephrology.

## 2018-01-19 NOTE — ASSESSMENT & PLAN NOTE
· Mildly elevated  · Monitor with serial labs  · No evidence of EKG changes / peaked Ts  · Monitor on telemetry

## 2018-01-19 NOTE — ASSESSMENT & PLAN NOTE
Currently intubated in the setting of respiratory failure 2/2  Profound  Metabolic acidosis. Etiology of this event is very unclear.  Agree with broad spectrum antibiotic coverage while awaiting culture data.   ABG this morning shows aggressive respiratory compensation: 7.4/15/142  Lactic acid is improving (7 this morning) and patient is due for CRRT today for further acid-base adjustments.  Maintain tidal volume of 450  Will decrease respiratory rate to 16 (patient currently riding the vent due to sedation)    Requested SAT and propofol is currently on hold.  Would discontinue precedex as patient is now bradycardic (and also on amiodarone)  If bradycardia does not improve--> would likely d/c amiodarone as well.    Hypotension has improved.  MAP is 75mmHg (Goal MAP=65mmHg).  Discontinue norepinephrine.    Afib with RVR: load with amio: now bradycardic with rate in the low 50's. D/c predex as above.  Troponins: 0.28-1.9. Cardiology consulted and evaluating the patient.

## 2018-01-19 NOTE — NURSING
Pt presented from ER with agonal respiration and pending doom appearance. Pt hard to arouse and presenting with rapid decline. Dr Cm notified and order to consult anesthesia received for intubation.

## 2018-01-19 NOTE — CONSULTS
"Gastroenterology    CC: Coffee ground emesis with anemia    HPI 67 y.o. male with dark, coffee ground like, recurrent, painless emesis.  No melena or hematochezia reported on since admit.  Admitted with resp failure/sepsis and intubated.  No abd pain reported in ER.      HPI from record and RN, as pt intubated    Unable to obtain PMHX    Social History  Social History   Substance Use Topics    Smoking status: Unknown If Ever Smoked    Smokeless tobacco: Not on file    Alcohol use Not on file     Unable to obtain FH    Review of Systems - limited due to pt status  Gastrointestinal ROS: + N/V    Physical Examination  BP (!) 100/50   Pulse 62   Temp 98.4 °F (36.9 °C) (Axillary)   Resp (!) 25   Ht 5' 9" (1.753 m)   Wt 68.6 kg (151 lb 3.8 oz)   SpO2 100%   BMI 22.33 kg/m²   General appearance: on vent, no jaundice, NAD  HENT: Normocephalic, atraumatic, neck symmetrical, no nasal discharge   Eyes: conjunctivae/corneas clear, PERRL  Lungs: clear to auscultation bilaterally, no dullness to percussion bilaterally  Heart: regular rate and rhythm without rub; no displacement of the PMI   Abdomen: soft, non-tender; no guarding, ND  Extremities: extremities symmetric; no clubbing, cyanosis  Integument: Skin color, texture, turgor normal; no rashes; hair distrubution normal    Labs:  LFTs nl  WBC and lactate elevated    Assessment:    Admitted with apparent sepsis, resp failure s/p intubation.  Dark material in NGT.  H/H dropped after admit.  On pressors.  No melena or hematochezia. Abd soft and no reports of abd pain in ER.      Plan:   - Recheck lactate  - On broad abx  - PPI ggt for now  - Getting PRBC  - Will monitor post transfusion H/H  - CT abd pending        "

## 2018-01-19 NOTE — ANESTHESIA PROCEDURE NOTES
Arterial    Diagnosis: Respiratory failure, ESRD, Severe hypotension  Doctor requesting consult: Ellyn    Patient location during procedure: ICU  Procedure start time: 1/19/2018 5:10 PM  Timeout: 1/19/2018 5:08 PM  Procedure end time: 1/19/2018 5:15 PM  Staffing  Anesthesiologist: SHRUTHI ROSAS  Performed: anesthesiologist   Anesthesiologist was present at the time of the procedure.  Preanesthetic Checklist  Completed: patient identified, site marked, surgical consent, pre-op evaluation, timeout performed, IV checked, risks and benefits discussed, monitors and equipment checked and anesthesia consent givenArterial  Skin Prep: chlorhexidine gluconate  Local Infiltration: none  Orientation: right  Location: radial  Catheter Size: 20 G  Catheter placement by Anatomical landmarks. Heme positive aspiration all ports.Insertion Attempts: 2  Assessment  Dressing: secured with tape and tegaderm  Patient: Tolerated well  Additional Notes  Patient had multiple stick sites on either wrist from previous blood gas draws. I attempted first on the left side and the catheter would not thread. The second attempt was on the right side and it was a single pass placement without incident. Patient intubated and sedated.

## 2018-01-19 NOTE — SUBJECTIVE & OBJECTIVE
History reviewed. No pertinent past medical history.    History reviewed. No pertinent surgical history.    Review of patient's allergies indicates:  No Known Allergies    Family History     None        Social History Main Topics    Smoking status: Unknown If Ever Smoked    Smokeless tobacco: Not on file    Alcohol use Not on file    Drug use: Unknown    Sexual activity: Not on file         Review of Systems   Unable to perform ROS: Acuity of condition     Objective:     Vital Signs (Most Recent):  Temp: 98.1 °F (36.7 °C) (01/19/18 1030)  Pulse: (!) 52 (01/19/18 1100)  Resp: (!) 0 (01/19/18 1100)  BP: (!) 132/58 (01/19/18 1100)  SpO2: 100 % (01/19/18 1100) Vital Signs (24h Range):  Temp:  [89.1 °F (31.7 °C)-99.2 °F (37.3 °C)] 98.1 °F (36.7 °C)  Pulse:  [] 52  Resp:  [0-47] 0  SpO2:  [98 %-100 %] 100 %  BP: ()/() 132/58     Weight: 68.6 kg (151 lb 3.8 oz)  Body mass index is 22.33 kg/m².      Intake/Output Summary (Last 24 hours) at 01/19/18 1127  Last data filed at 01/19/18 1015   Gross per 24 hour   Intake          7047.27 ml   Output              800 ml   Net          6247.27 ml       Physical Exam   Constitutional: He appears well-developed and well-nourished.   HENT:   Head: Normocephalic and atraumatic.   Eyes:   Pupils are equal   Neck: Normal range of motion. No tracheal deviation present.   Cardiovascular: Normal rate and regular rhythm.    No murmur heard.  Pulmonary/Chest: Effort normal and breath sounds normal. He has no wheezes. He has no rales.   Abdominal: Soft. Bowel sounds are normal. He exhibits no distension. There is no tenderness.   Musculoskeletal: He exhibits no edema.   Neurological:   Sedated with propofol and precedex   Skin: Skin is warm and dry.   Psychiatric: He has a normal mood and affect. His behavior is normal.   Vitals reviewed.      Vents:  Vent Mode: PRVC (01/19/18 0944)  Ventilator Initiated: Yes (01/18/18 1619)  Set Rate: 24 bmp (01/19/18 0944)  Vt Set: 450  mL (01/19/18 0944)  PEEP/CPAP: 5 cmH20 (01/19/18 0944)  Oxygen Concentration (%): 28 (01/19/18 0944)  Peak Airway Pressure: 19 cmH2O (01/19/18 0944)  Total Ve: 11.2 mL (01/19/18 0944)  F/VT Ratio<105 (RSBI): (!) 0 (01/19/18 0944)    Lines/Drains/Airways     Central Venous Catheter Line                 Hemodialysis Catheter 01/18/18 1707 right femoral less than 1 day         Percutaneous Central Line Insertion/Assessment - triple lumen  01/18/18 1500 right internal jugular less than 1 day          Drain                 NG/OG Tube 01/18/18 1708 [place] sump 16 Fr. Center mouth less than 1 day         Urethral Catheter 01/18/18 1200 less than 1 day          Airway                 Airway - Non-Surgical 01/18/18 1615 Endotracheal Tube less than 1 day          Peripheral Intravenous Line                 Peripheral IV - Double Lumen 01/18/18 1413 Right Wrist less than 1 day         Peripheral IV - Single Lumen 01/18/18 1430 Left Forearm less than 1 day                Significant Labs:    CBC/Anemia Profile:    Recent Labs  Lab 01/18/18  1225 01/19/18  0307 01/19/18  0424   WBC 20.56* 29.73* 25.51*   HGB 10.1* 7.9* 7.4*   HCT 33.2* 24.1* 22.5*   * 363* 342   MCV 98 93 92   RDW 13.2 13.2 13.3   IRON  --  84  --    FERRITIN  --  723*  --    RETIC  --  1.8  --         Chemistries:    Recent Labs  Lab 01/18/18  1225 01/18/18  1430 01/19/18  0307     --  137   K 7.4*  --  5.1     --  93*   CO2 <5*  --  8*   BUN 57*  --  25*   CREATININE 12.1*  --  5.1*   CALCIUM 9.9  --  7.7*   ALBUMIN 3.2*  --   --    PROT 6.6  --   --    BILITOT 0.5  --   --    ALKPHOS 113  --   --    ALT 7*  --   --    AST 12  --   --    MG  --  2.3  --    PHOS  --  14.4*  --        All pertinent labs within the past 24 hours have been reviewed.    Significant Imaging:   I have reviewed and interpreted all pertinent imaging results/findings within the past 24 hours.

## 2018-01-19 NOTE — PLAN OF CARE
Problem: Patient Care Overview  Goal: Plan of Care Review  Outcome: Ongoing (interventions implemented as appropriate)  Pt on hemodialysis and very labile with vital signs. Norepinephrine being titrated for low blood pressure. Pt bucking vent and inability to sedate relayed to primary MD. Precedex drip ordered and started. Pt having bloody nasogastric secretions and NGTplaced on LIWS. Collins to gravity with clear javier urine noted. Pt free of injury. Bed in low position. Amiodarone drip started for Afib with RVR as per Dr Ferguson. ABG done.

## 2018-01-19 NOTE — ASSESSMENT & PLAN NOTE
Unknown etiology but concerning for sepsis.  Bacteremia?  No obvious infectious source identified at this time.  Cultures pending.

## 2018-01-19 NOTE — CONSULTS
[facility]  Cardiology  Consult Note    Patient Name: ERNA Jesus  MRN: [id number]  Admission Date: 1/18/2018  Hospital Length of Stay: 1 days  Code Status: Full Code   Attending Provider: Giancarlo Cm MD   Consulting Provider: Arley Rajput MD  Primary Care Physician: No primary care provider on file.  Principal Problem:Metabolic acidosis    Patient information was obtained from ER records.     Consults  Subjective:     Chief Complaint:  PAF     HPI:   ERNA Jesus is a 67 y.o. male that (in part)  has no past medical history on file. Presents to [facility] Emergency Department from [facility] for evaluation of rapid heart rate, tachypnea, and associated nausea and vomiting.  Unfortunately the patient is a poor historian and therefore the history is limited.  He was increasingly somnolent and had unintelligible speech.  He is chronically debilitated.     In the emergency department  routine laboratory studies, chest x-ray, EKG, cardiac enzymes were obtained.  There was immediate concern for significant metabolic acidosis with profound lactic acid acidosis, hypoperfusion, hypotension, hypothermia, hyperkalemia, hyperphosphatemia, and metabolic encephalopathy.  Nephrology was consulted emergently.  Central access was obtained and A dialysis catheter was placed.    EKG A-fib 72 IVCD    Currently intubated in ICU  Sinus bradycardia 51. Review of telemetry shows episodes of PAF with RVR    History reviewed. No pertinent past medical history.    History reviewed. No pertinent surgical history.    Review of patient's allergies indicates:  No Known Allergies    No current facility-administered medications on file prior to encounter.      No current outpatient prescriptions on file prior to encounter.     Family History     None        Social History Main Topics    Smoking status: Unknown If Ever Smoked    Smokeless tobacco: Not on file    Alcohol use  Not on file    Drug use: Unknown    Sexual activity: Not on file     Review of Systems   Unable to perform ROS: intubated     Objective:     Vital Signs (Most Recent):  Temp: 98.1 °F (36.7 °C) (01/19/18 1030)  Pulse: (!) 52 (01/19/18 1100)  Resp: (!) 0 (01/19/18 1100)  BP: (!) 132/58 (01/19/18 1100)  SpO2: 100 % (01/19/18 1100) Vital Signs (24h Range):  Temp:  [89.1 °F (31.7 °C)-99.2 °F (37.3 °C)] 98.1 °F (36.7 °C)  Pulse:  [] 52  Resp:  [0-47] 0  SpO2:  [98 %-100 %] 100 %  BP: ()/() 132/58     Weight: 68.6 kg (151 lb 3.8 oz)  Body mass index is 22.33 kg/m².    SpO2: 100 %  O2 Device (Oxygen Therapy): ventilator      Intake/Output Summary (Last 24 hours) at 01/19/18 1132  Last data filed at 01/19/18 1015   Gross per 24 hour   Intake          7047.27 ml   Output              810 ml   Net          6237.27 ml       Lines/Drains/Airways     Central Venous Catheter Line                 Hemodialysis Catheter 01/18/18 1707 right femoral less than 1 day         Percutaneous Central Line Insertion/Assessment - triple lumen  01/18/18 1500 right internal jugular less than 1 day          Drain                 NG/OG Tube 01/18/18 1708 [place] sump 16 Fr. Center mouth less than 1 day         Urethral Catheter 01/18/18 1200 less than 1 day          Airway                 Airway - Non-Surgical 01/18/18 1615 Endotracheal Tube less than 1 day          Peripheral Intravenous Line                 Peripheral IV - Double Lumen 01/18/18 1413 Right Wrist less than 1 day         Peripheral IV - Single Lumen 01/18/18 1430 Left Forearm less than 1 day                Physical Exam   Constitutional: He is oriented to person, place, and time. He appears well-developed and well-nourished.   HENT:   Head: Normocephalic and atraumatic.   Eyes: Conjunctivae are normal. Pupils are equal, round, and reactive to light.   Neck: Normal range of motion. Neck supple.   Cardiovascular: Normal rate, normal heart sounds and intact distal  pulses.    Pulmonary/Chest: Effort normal and breath sounds normal.   Abdominal: Soft. Bowel sounds are normal.   Musculoskeletal: Normal range of motion.   Neurological: He is alert and oriented to person, place, and time.   Skin: Skin is warm and dry.       Significant Labs: All pertinent lab results from the last 24 hours have been reviewed.    Significant Imaging: X-Ray: CXR: X-Ray Chest 1 View (CXR):   Results for orders placed or performed during the hospital encounter of 01/18/18   X-Ray Chest 1 View    Narrative    Portable AP view of the chest was obtained.  Comparison -- 1/18 at 1153.  Right jugular catheter has been placed with tip in SVC.  No pneumothorax post placement.  Endotracheal tube has been placed with tip 4 cm above melissa.  Gastric tube has also been placed with tip in the stomach .  The heart size is normal and mediastinal structures are midline.  Lungs are expanded and clear.  No lung consolidation or pleural fluid is identified.    Visualized upper abdomen shows prominent gas in the stomach and small bowel.    Impression     Interval intubation and central line placement.      No acute cardiopulmonary disease.      Electronically signed by: ALLYN JOHNSON MD  Date:     01/18/18  Time:    16:47      Assessment and Plan:     * Metabolic acidosis             Acute respiratory failure             Elevated troponin    Suspect related to demand ischemia and ARF. Ischemic evaluation when he stabilizes        Encephalopathy, metabolic             Acute renal failure             Paroxysmal tachycardia    Appears to be PAF, currently in sinus bradycardia. Rhythm likely aggravated by electrolyte abnormalities. Check echo. Off of amiodarone now. No anticoagulation with anemia        Lactic acid acidosis                 VTE Risk Mitigation         Ordered     heparin (porcine) injection 5,000 Units  Every 8 hours     Route:  Subcutaneous        01/18/18 1551     Place sequential compression device   Until discontinued      01/18/18 2016     heparin (porcine) injection 5,000 Units  As needed (PRN)     Route:  Intra-Catheter        01/18/18 1757     Medium Risk of VTE  Once      01/18/18 1551          Thank you for your consult. I will follow-up with patient. Please contact us if you have any additional questions.    Arley Rajput MD  Cardiology   [facility]

## 2018-01-19 NOTE — HPI
67 year old man presenting from nursing home with afib with RVR , tachypnea, and N/V and found to have significant metabolic acidosis.   Labs on admission were significant for a lactic acid>12, creatinine of 12, as well as anemia, and leukocytosis. He was also noted to be hypothermic and hypotensive.

## 2018-01-19 NOTE — HPI
ERNA Jesus is a 67 y.o. male that (in part)  has no past medical history on file. Presents to [facility] Emergency Department from [facility] for evaluation of rapid heart rate, tachypnea, and associated nausea and vomiting.  Unfortunately the patient is a poor historian and therefore the history is limited.  He was increasingly somnolent and had unintelligible speech.  He is chronically debilitated.    In the emergency department  routine laboratory studies, chest x-ray, EKG, cardiac enzymes were obtained.  There was immediate concern for significant metabolic acidosis with profound lactic acid acidosis, hypoperfusion, hypotension, hypothermia, hyperkalemia, hyperphosphatemia, and metabolic encephalopathy.  Nephrology was consulted emergently.  Central access was obtained and A dialysis catheter was placed.    Hospital medicine has been asked to admit for further evaluation and treatment.

## 2018-01-19 NOTE — SUBJECTIVE & OBJECTIVE
History reviewed. No pertinent past medical history.    History reviewed. No pertinent surgical history.    Review of patient's allergies indicates:  No Known Allergies    No current facility-administered medications on file prior to encounter.      No current outpatient prescriptions on file prior to encounter.     Family History     None        Social History Main Topics    Smoking status: Unknown If Ever Smoked    Smokeless tobacco: Not on file    Alcohol use Not on file    Drug use: Unknown    Sexual activity: Not on file     Review of Systems   Unable to perform ROS: intubated     Objective:     Vital Signs (Most Recent):  Temp: 98.1 °F (36.7 °C) (01/19/18 1030)  Pulse: (!) 52 (01/19/18 1100)  Resp: (!) 0 (01/19/18 1100)  BP: (!) 132/58 (01/19/18 1100)  SpO2: 100 % (01/19/18 1100) Vital Signs (24h Range):  Temp:  [89.1 °F (31.7 °C)-99.2 °F (37.3 °C)] 98.1 °F (36.7 °C)  Pulse:  [] 52  Resp:  [0-47] 0  SpO2:  [98 %-100 %] 100 %  BP: ()/() 132/58     Weight: 68.6 kg (151 lb 3.8 oz)  Body mass index is 22.33 kg/m².    SpO2: 100 %  O2 Device (Oxygen Therapy): ventilator      Intake/Output Summary (Last 24 hours) at 01/19/18 1132  Last data filed at 01/19/18 1015   Gross per 24 hour   Intake          7047.27 ml   Output              810 ml   Net          6237.27 ml       Lines/Drains/Airways     Central Venous Catheter Line                 Hemodialysis Catheter 01/18/18 1707 right femoral less than 1 day         Percutaneous Central Line Insertion/Assessment - triple lumen  01/18/18 1500 right internal jugular less than 1 day          Drain                 NG/OG Tube 01/18/18 1708 [place] sump 16 Fr. Center mouth less than 1 day         Urethral Catheter 01/18/18 1200 less than 1 day          Airway                 Airway - Non-Surgical 01/18/18 1615 Endotracheal Tube less than 1 day          Peripheral Intravenous Line                 Peripheral IV - Double Lumen 01/18/18 1413 Right Wrist less  than 1 day         Peripheral IV - Single Lumen 01/18/18 1430 Left Forearm less than 1 day                Physical Exam   Constitutional: He is oriented to person, place, and time. He appears well-developed and well-nourished.   HENT:   Head: Normocephalic and atraumatic.   Eyes: Conjunctivae are normal. Pupils are equal, round, and reactive to light.   Neck: Normal range of motion. Neck supple.   Cardiovascular: Normal rate, normal heart sounds and intact distal pulses.    Pulmonary/Chest: Effort normal and breath sounds normal.   Abdominal: Soft. Bowel sounds are normal.   Musculoskeletal: Normal range of motion.   Neurological: He is alert and oriented to person, place, and time.   Skin: Skin is warm and dry.       Significant Labs: All pertinent lab results from the last 24 hours have been reviewed.    Significant Imaging: X-Ray: CXR: X-Ray Chest 1 View (CXR):   Results for orders placed or performed during the hospital encounter of 01/18/18   X-Ray Chest 1 View    Narrative    Portable AP view of the chest was obtained.  Comparison -- 1/18 at 1153.  Right jugular catheter has been placed with tip in SVC.  No pneumothorax post placement.  Endotracheal tube has been placed with tip 4 cm above melissa.  Gastric tube has also been placed with tip in the stomach .  The heart size is normal and mediastinal structures are midline.  Lungs are expanded and clear.  No lung consolidation or pleural fluid is identified.    Visualized upper abdomen shows prominent gas in the stomach and small bowel.    Impression     Interval intubation and central line placement.      No acute cardiopulmonary disease.      Electronically signed by: ALLYN JOHNSON MD  Date:     01/18/18  Time:    16:47

## 2018-01-19 NOTE — PLAN OF CARE
Problem: Patient Care Overview  Goal: Plan of Care Review  Outcome: Ongoing (interventions implemented as appropriate)  Plan of care reviewed. No falls skin assessed. Remains in ICU on vent. Propofol and precedex infusing for sedation. Amio gtt at .5 HR now NS on monitor, BICARB gtt completed, NS at 50 mL/h, Titrating LEVO to maintain MAP>65. Temp now 99.2. CVP 12-13. Dialysis completed, +500 mL. Labs monitored. Accu checks scheduled Q6 with sliding scale. Restraints remain to bilat wrist, no signs of injury. ABX given as scheduled. VSS will continue to monitor.

## 2018-01-20 LAB
ALBUMIN SERPL BCP-MCNC: 2.2 G/DL
ALLENS TEST: ABNORMAL
ALLENS TEST: ABNORMAL
ANION GAP SERPL CALC-SCNC: 18 MMOL/L
ANION GAP SERPL CALC-SCNC: 18 MMOL/L
BACTERIA UR CULT: NO GROWTH
BASOPHILS # BLD AUTO: 0.01 K/UL
BASOPHILS # BLD AUTO: 0.01 K/UL
BASOPHILS NFR BLD: 0.1 %
BASOPHILS NFR BLD: 0.1 %
BNP SERPL-MCNC: 202 PG/ML
BUN SERPL-MCNC: 28 MG/DL
BUN SERPL-MCNC: 28 MG/DL
CALCIUM SERPL-MCNC: 7.4 MG/DL
CALCIUM SERPL-MCNC: 7.4 MG/DL
CHLORIDE SERPL-SCNC: 99 MMOL/L
CHLORIDE SERPL-SCNC: 99 MMOL/L
CO2 SERPL-SCNC: 18 MMOL/L
CO2 SERPL-SCNC: 19 MMOL/L
CREAT SERPL-MCNC: 4.4 MG/DL
CREAT SERPL-MCNC: 4.4 MG/DL
DELSYS: ABNORMAL
DELSYS: ABNORMAL
DIFFERENTIAL METHOD: ABNORMAL
DIFFERENTIAL METHOD: ABNORMAL
EOSINOPHIL # BLD AUTO: 0 K/UL
EOSINOPHIL # BLD AUTO: 0.5 K/UL
EOSINOPHIL NFR BLD: 0 %
EOSINOPHIL NFR BLD: 3.3 %
ERYTHROCYTE [DISTWIDTH] IN BLOOD BY AUTOMATED COUNT: 13.8 %
ERYTHROCYTE [DISTWIDTH] IN BLOOD BY AUTOMATED COUNT: 14 %
ERYTHROCYTE [SEDIMENTATION RATE] IN BLOOD BY WESTERGREN METHOD: 16 MM/H
EST. GFR  (AFRICAN AMERICAN): 15 ML/MIN/1.73 M^2
EST. GFR  (AFRICAN AMERICAN): 15 ML/MIN/1.73 M^2
EST. GFR  (NON AFRICAN AMERICAN): 13 ML/MIN/1.73 M^2
EST. GFR  (NON AFRICAN AMERICAN): 13 ML/MIN/1.73 M^2
FIO2: 28
FIO2: 28
FLUAV AG SPEC QL IA: NEGATIVE
FLUBV AG SPEC QL IA: NEGATIVE
GLUCOSE SERPL-MCNC: 163 MG/DL
GLUCOSE SERPL-MCNC: 165 MG/DL
HCO3 UR-SCNC: 21.5 MMOL/L (ref 24–28)
HCO3 UR-SCNC: 22.8 MMOL/L (ref 24–28)
HCT VFR BLD AUTO: 26.3 %
HCT VFR BLD AUTO: 27.5 %
HEP. B SURF AB, QUAL: POSITIVE
HEP. B SURF AB, QUANT.: 18 MIU/ML
HGB BLD-MCNC: 9 G/DL
HGB BLD-MCNC: 9.3 G/DL
LYMPHOCYTES # BLD AUTO: 0.4 K/UL
LYMPHOCYTES # BLD AUTO: 0.7 K/UL
LYMPHOCYTES NFR BLD: 2.6 %
LYMPHOCYTES NFR BLD: 3.4 %
MAGNESIUM SERPL-MCNC: 2 MG/DL
MCH RBC QN AUTO: 29.2 PG
MCH RBC QN AUTO: 30.3 PG
MCHC RBC AUTO-ENTMCNC: 33.8 G/DL
MCHC RBC AUTO-ENTMCNC: 34.2 G/DL
MCV RBC AUTO: 86 FL
MCV RBC AUTO: 89 FL
MIN VOL: 7.4
MODE: ABNORMAL
MODE: ABNORMAL
MONOCYTES # BLD AUTO: 0.5 K/UL
MONOCYTES # BLD AUTO: 1 K/UL
MONOCYTES NFR BLD: 3 %
MONOCYTES NFR BLD: 5.2 %
NEUTROPHILS # BLD AUTO: 14.3 K/UL
NEUTROPHILS # BLD AUTO: 17.2 K/UL
NEUTROPHILS NFR BLD: 90.5 %
NEUTROPHILS NFR BLD: 90.8 %
PCO2 BLDA: 27.5 MMHG (ref 35–45)
PCO2 BLDA: 31.9 MMHG (ref 35–45)
PEEP: 5
PEEP: 5
PH SMN: 7.46 [PH] (ref 7.35–7.45)
PH SMN: 7.5 [PH] (ref 7.35–7.45)
PHOSPHATE SERPL-MCNC: 4.2 MG/DL
PIP: 24
PLATELET # BLD AUTO: 235 K/UL
PLATELET # BLD AUTO: 265 K/UL
PMV BLD AUTO: 10.5 FL
PMV BLD AUTO: 10.7 FL
PO2 BLDA: 112 MMHG (ref 80–100)
PO2 BLDA: 82 MMHG (ref 80–100)
POC BE: -1 MMOL/L
POC BE: -1 MMOL/L
POC SATURATED O2: 97 % (ref 95–100)
POC SATURATED O2: 99 % (ref 95–100)
POC TCO2: 22 MMOL/L (ref 23–27)
POC TCO2: 24 MMOL/L (ref 23–27)
POCT GLUCOSE: 207 MG/DL (ref 70–110)
POCT GLUCOSE: 219 MG/DL (ref 70–110)
POCT GLUCOSE: 231 MG/DL (ref 70–110)
POTASSIUM SERPL-SCNC: 4.8 MMOL/L
POTASSIUM SERPL-SCNC: 4.8 MMOL/L
PS: 10
RBC # BLD AUTO: 2.97 M/UL
RBC # BLD AUTO: 3.19 M/UL
SAMPLE: ABNORMAL
SAMPLE: ABNORMAL
SITE: ABNORMAL
SITE: ABNORMAL
SODIUM SERPL-SCNC: 135 MMOL/L
SODIUM SERPL-SCNC: 136 MMOL/L
SP02: 99
SPECIMEN SOURCE: NORMAL
T4 FREE SERPL-MCNC: 0.94 NG/DL
TROPONIN I SERPL DL<=0.01 NG/ML-MCNC: 0.59 NG/ML
TROPONIN I SERPL DL<=0.01 NG/ML-MCNC: 0.82 NG/ML
TROPONIN I SERPL DL<=0.01 NG/ML-MCNC: 0.94 NG/ML
TROPONIN I SERPL DL<=0.01 NG/ML-MCNC: 1.32 NG/ML
TSH SERPL DL<=0.005 MIU/L-ACNC: 4.46 UIU/ML
VT: 450
WBC # BLD AUTO: 15.85 K/UL
WBC # BLD AUTO: 18.98 K/UL

## 2018-01-20 PROCEDURE — 85347 COAGULATION TIME ACTIVATED: CPT

## 2018-01-20 PROCEDURE — 83880 ASSAY OF NATRIURETIC PEPTIDE: CPT

## 2018-01-20 PROCEDURE — 63600175 PHARM REV CODE 636 W HCPCS: Performed by: FAMILY MEDICINE

## 2018-01-20 PROCEDURE — 87400 INFLUENZA A/B EACH AG IA: CPT

## 2018-01-20 PROCEDURE — 25000003 PHARM REV CODE 250: Performed by: HOSPITALIST

## 2018-01-20 PROCEDURE — C9113 INJ PANTOPRAZOLE SODIUM, VIA: HCPCS | Performed by: INTERNAL MEDICINE

## 2018-01-20 PROCEDURE — 25000003 PHARM REV CODE 250: Performed by: INTERNAL MEDICINE

## 2018-01-20 PROCEDURE — 27000221 HC OXYGEN, UP TO 24 HOURS

## 2018-01-20 PROCEDURE — 84484 ASSAY OF TROPONIN QUANT: CPT | Mod: 91

## 2018-01-20 PROCEDURE — 94003 VENT MGMT INPAT SUBQ DAY: CPT

## 2018-01-20 PROCEDURE — 37799 UNLISTED PX VASCULAR SURGERY: CPT

## 2018-01-20 PROCEDURE — 63600175 PHARM REV CODE 636 W HCPCS: Performed by: INTERNAL MEDICINE

## 2018-01-20 PROCEDURE — 86684 HEMOPHILUS INFLUENZA ANTIBDY: CPT

## 2018-01-20 PROCEDURE — 83735 ASSAY OF MAGNESIUM: CPT

## 2018-01-20 PROCEDURE — 36600 WITHDRAWAL OF ARTERIAL BLOOD: CPT

## 2018-01-20 PROCEDURE — 99900035 HC TECH TIME PER 15 MIN (STAT)

## 2018-01-20 PROCEDURE — 99233 SBSQ HOSP IP/OBS HIGH 50: CPT | Mod: ,,, | Performed by: INTERNAL MEDICINE

## 2018-01-20 PROCEDURE — 80048 BASIC METABOLIC PNL TOTAL CA: CPT

## 2018-01-20 PROCEDURE — 85025 COMPLETE CBC W/AUTO DIFF WBC: CPT

## 2018-01-20 PROCEDURE — 84443 ASSAY THYROID STIM HORMONE: CPT

## 2018-01-20 PROCEDURE — 20000000 HC ICU ROOM

## 2018-01-20 PROCEDURE — 63600175 PHARM REV CODE 636 W HCPCS: Performed by: EMERGENCY MEDICINE

## 2018-01-20 PROCEDURE — 82803 BLOOD GASES ANY COMBINATION: CPT

## 2018-01-20 PROCEDURE — 36415 COLL VENOUS BLD VENIPUNCTURE: CPT

## 2018-01-20 PROCEDURE — 80069 RENAL FUNCTION PANEL: CPT

## 2018-01-20 PROCEDURE — 84439 ASSAY OF FREE THYROXINE: CPT

## 2018-01-20 RX ORDER — MAGNESIUM SULFATE HEPTAHYDRATE 40 MG/ML
2 INJECTION, SOLUTION INTRAVENOUS ONCE
Status: COMPLETED | OUTPATIENT
Start: 2018-01-20 | End: 2018-01-20

## 2018-01-20 RX ORDER — ACETAMINOPHEN 650 MG/20.3ML
650 LIQUID ORAL EVERY 4 HOURS PRN
Status: DISCONTINUED | OUTPATIENT
Start: 2018-01-20 | End: 2018-01-26 | Stop reason: HOSPADM

## 2018-01-20 RX ORDER — AMIODARONE HYDROCHLORIDE 200 MG/1
200 TABLET ORAL 2 TIMES DAILY
Status: DISCONTINUED | OUTPATIENT
Start: 2018-01-20 | End: 2018-01-20

## 2018-01-20 RX ADMIN — DEXTROSE MONOHYDRATE 8 MG/HR: 5 INJECTION INTRAVENOUS at 09:01

## 2018-01-20 RX ADMIN — CIPROFLOXACIN 400 MG: 2 INJECTION, SOLUTION INTRAVENOUS at 02:01

## 2018-01-20 RX ADMIN — METHYLPREDNISOLONE SODIUM SUCCINATE 40 MG: 40 INJECTION, POWDER, FOR SOLUTION INTRAMUSCULAR; INTRAVENOUS at 09:01

## 2018-01-20 RX ADMIN — PROPOFOL 50 MCG/KG/MIN: 10 INJECTION, EMULSION INTRAVENOUS at 07:01

## 2018-01-20 RX ADMIN — MAGNESIUM SULFATE IN WATER 2 G: 40 INJECTION, SOLUTION INTRAVENOUS at 07:01

## 2018-01-20 RX ADMIN — HEPARIN SODIUM 5000 UNITS: 5000 INJECTION, SOLUTION INTRAVENOUS; SUBCUTANEOUS at 01:01

## 2018-01-20 RX ADMIN — LEVOTHYROXINE SODIUM ANHYDROUS 100 MCG: 100 INJECTION, POWDER, LYOPHILIZED, FOR SOLUTION INTRAVENOUS at 09:01

## 2018-01-20 RX ADMIN — DEXMEDETOMIDINE HYDROCHLORIDE 0.4 MCG/KG/HR: 100 INJECTION, SOLUTION INTRAVENOUS at 08:01

## 2018-01-20 RX ADMIN — CEFEPIME 1 G: 1 INJECTION, POWDER, FOR SOLUTION INTRAMUSCULAR; INTRAVENOUS at 01:01

## 2018-01-20 RX ADMIN — DEXTROSE MONOHYDRATE 8 MG/HR: 5 INJECTION INTRAVENOUS at 06:01

## 2018-01-20 RX ADMIN — INSULIN ASPART 4 UNITS: 100 INJECTION, SOLUTION INTRAVENOUS; SUBCUTANEOUS at 04:01

## 2018-01-20 RX ADMIN — INSULIN ASPART 4 UNITS: 100 INJECTION, SOLUTION INTRAVENOUS; SUBCUTANEOUS at 12:01

## 2018-01-20 RX ADMIN — HEPARIN SODIUM 5000 UNITS: 5000 INJECTION, SOLUTION INTRAVENOUS; SUBCUTANEOUS at 02:01

## 2018-01-20 RX ADMIN — CEFEPIME 1 G: 1 INJECTION, POWDER, FOR SOLUTION INTRAMUSCULAR; INTRAVENOUS at 02:01

## 2018-01-20 RX ADMIN — PROPOFOL 40 MCG/KG/MIN: 10 INJECTION, EMULSION INTRAVENOUS at 03:01

## 2018-01-20 RX ADMIN — SODIUM CHLORIDE: 0.9 INJECTION, SOLUTION INTRAVENOUS at 09:01

## 2018-01-20 RX ADMIN — INSULIN ASPART 4 UNITS: 100 INJECTION, SOLUTION INTRAVENOUS; SUBCUTANEOUS at 06:01

## 2018-01-20 NOTE — PLAN OF CARE
Problem: Patient Care Overview  Goal: Plan of Care Review  Outcome: Ongoing (interventions implemented as appropriate)  Plan of care reviewed. No falls skin assessed. Remains on vent, weaning as tolerated. CRRT paused at 0100 due to clotting, on call MD Taylor notified, stated to leave CRRT on pause. Propofol infusing for sedation, NS @ 50 mL/h, Protonix gtt and LEVO infusing. Abx given as scheduled. Became hypothermic during CRRT, warming blanket applied. Adequate UOP. CBG monitored, supplemental insulin given. Labs monitored. VSS will continue to assess.

## 2018-01-20 NOTE — PLAN OF CARE
Problem: Patient Care Overview  Goal: Plan of Care Review  Outcome: Ongoing (interventions implemented as appropriate)  Pt resting well in bed. Tolerating extubation well. O2 sat 100% on 3L nasal cannula. Pt reposition Q2 and active ROM encouraged. Pt has history of neuropathy to legs and refuses to move legs due to pain with activity. Pt refuses pain meds. Pt free of injury. Bed close to nurses station. Bed in low position with alarm on. Pt bony prominence supported on pillows. Pt free of injury. Skin intact. Oral care done and ice chips given to assess swallowing. Pt tolerated ice chip well.

## 2018-01-20 NOTE — EICU
Minimal ventilator support currently, not on pressors, metabolic acidosis resolved.  Recommend discontinue sedation and perform SBT to evaluate for extubation today.    Christopher Lou MD

## 2018-01-20 NOTE — PLAN OF CARE
Problem: Patient Care Overview  Goal: Plan of Care Review  Outcome: Ongoing (interventions implemented as appropriate)  Pt resting well in bed on CRRT and tolerating well. Pt tolerated testing and procedures well. [place] to right wrist with good waveform. Pt free of injury IV infusing without complication to sites. Pt woke up on weaning trial and was able to follow commands. Pulse in the 40s and Dr Rajput aware. Bed in low position with alarm on. Pt remains sedated on Propofol.

## 2018-01-20 NOTE — PROGRESS NOTES
[facility]  General Surgery  Progress Note     Subjective:      Interval History: Weaned off pressors overnight. Plan to extubate this AM if passes SBT. No further evidence of GI bleed. Had small BM. Transfused 2 units PRBCs.     Objective:   Vital Signs:   Vitals:    01/20/18 1400 01/20/18 1415 01/20/18 1430 01/20/18 1445   BP:  135/75 134/75 132/79   Pulse: 85 86 83 85   Resp: (!) 21 17 17 20   Temp:       TempSrc:       SpO2: 100% 100% 100% 100%   Weight:       Height:         Intake/Output Summary (Last 24 hours):    Intake/Output Summary (Last 24 hours) at 01/20/18 1457  Last data filed at 01/20/18 0600   Gross per 24 hour   Intake          2179.07 ml   Output             1164 ml   Net          1015.07 ml      Physical Exam:  General appearance: intubated, sedated  HEENT: NG tube with clear, non-bloody drainage, EOMI, anicteric, dry oral mucosal membranes  CV: RRR  Pulm: symmetric chest expansion  Abd:  soft, NTND, no palpable masses or hernias, no surgical scars  Neuro: non-focal, sedated  Extremities: warm, well-perfused, trace pre-tibial edema  Skin: warm, dry, no rashes     Significant Labs:  WBC 15.9, Hgb 9, Hct 26.3, plt 235  Na 135, K 4.8, BUN 28, Cr 4.4  Lactic acid >12-->7     Assessment/Plan:    68 yo man with history of HTN, diabetes, hypothyroidism, depression presenting with acute respiratory failure requiring intubation and ARF. Patient's abdominal exam remains benign and CT scan did not demonstrate any concerning findings from a surgical standpoint. Lactic acid is downtrending, patient is off pressors.      -No acute surgical issues  -GI following, no evidence of GI bleed  -Protonix gtt  -Continue to trend lactic acid  -Fluid resuscitation  -Prophylactic antibiotics, continue septic work up  -Surgery team will sign off, please re-consult for any additional issues     Coral Verdin MD  General Surgery  [facility]

## 2018-01-20 NOTE — PLAN OF CARE
Problem: Patient Care Overview  Goal: Plan of Care Review  Patient received on servoi ventilator with 7.5 ETT secured at 24cm with ETT moved to center lip. All ventilator alarms on, set and functioning. ambu bag and mask at bedside. Suctioning done. Will continue with ventilator management

## 2018-01-20 NOTE — SUBJECTIVE & OBJECTIVE
Interval History:     Review of Systems   Unable to perform ROS: intubated     Objective:     Vital Signs (Most Recent):  Temp: 98.6 °F (37 °C) (01/20/18 0800)  Pulse: 82 (01/20/18 0830)  Resp: 15 (01/20/18 0830)  BP: (!) 111/42 (01/20/18 0545)  SpO2: 98 % (01/20/18 0830) Vital Signs (24h Range):  Temp:  [94.7 °F (34.8 °C)-98.6 °F (37 °C)] 98.6 °F (37 °C)  Pulse:  [44-88] 82  Resp:  [0-32] 15  SpO2:  [92 %-100 %] 98 %  BP: ()/(42-67) 111/42  Arterial Line BP: (110-166)/(42-59) 111/48     Weight: 65.8 kg (145 lb 1 oz)  Body mass index is 21.42 kg/m².     SpO2: 98 %  O2 Device (Oxygen Therapy): ventilator      Intake/Output Summary (Last 24 hours) at 01/20/18 0913  Last data filed at 01/20/18 0600   Gross per 24 hour   Intake          2947.93 ml   Output             1224 ml   Net          1723.93 ml       Lines/Drains/Airways     Central Venous Catheter Line                 Hemodialysis Catheter 01/18/18 1707 right femoral 1 day         Percutaneous Central Line Insertion/Assessment - triple lumen  01/18/18 1500 right internal jugular 1 day          Drain                 NG/OG Tube 01/18/18 1708 [place] sump 16 Fr. Center mouth 1 day         Urethral Catheter 01/18/18 1200 1 day          Airway                 Airway - Non-Surgical 01/18/18 1615 Endotracheal Tube 1 day          Arterial Line                 Arterial Line 01/19/18 1710 Right Radial less than 1 day          Peripheral Intravenous Line                 Peripheral IV - Double Lumen 01/18/18 1413 Right Wrist 1 day         Peripheral IV - Single Lumen 01/18/18 1430 Left Forearm 1 day                Physical Exam   Constitutional: He is oriented to person, place, and time. He appears well-developed and well-nourished.   HENT:   Head: Normocephalic and atraumatic.   Eyes: Conjunctivae are normal. Pupils are equal, round, and reactive to light.   Neck: Normal range of motion. Neck supple.   Cardiovascular: Normal rate, normal heart sounds and intact distal  pulses.    Pulmonary/Chest: Effort normal and breath sounds normal.   Abdominal: Soft. Bowel sounds are normal.   Musculoskeletal: Normal range of motion.   Neurological: He is alert and oriented to person, place, and time.   Skin: Skin is warm and dry.       Significant Labs: All pertinent lab results from the last 24 hours have been reviewed.    Significant Imaging: Echocardiogram:   2D echo with color flow doppler:   Results for orders placed or performed during the hospital encounter of 01/18/18   2D echo with color flow doppler   Result Value Ref Range    EF 60 55 - 65    Mitral Valve Regurgitation TRIVIAL     Diastolic Dysfunction No     Est. PA Systolic Pressure 31.09     Tricuspid Valve Regurgitation TRIVIAL TO MILD

## 2018-01-20 NOTE — HOSPITAL COURSE
1/22 Bradycardic yesterday. Rates improved now. Still having episodes of A-fib RVR  1/24 NSR with frequent PACs  1/25: No acute events

## 2018-01-20 NOTE — PROGRESS NOTES
ERNA Jesus is a 67 y.o. male patient.    Follow for SOURAV    Sedated on vent support  Comfortable    Scheduled Meds:   calcium gluconate IVPB  1,000 mg Intravenous Once    ceFEPime (MAXIPIME) IVPB  1 g Intravenous Q12H    ciprofloxacin  400 mg Intravenous Q24H    heparin (porcine)  5,000 Units Subcutaneous Q8H    levothyroxine  100 mcg Intravenous Daily    lidocaine HCL 10 mg/ml (1%)  5 mL Other Once    methylPREDNISolone sodium succinate  40 mg Intravenous Q12H       Review of patient's allergies indicates:  No Known Allergies      Vital Signs Range (Last 24H):  Temp:  [94.7 °F (34.8 °C)-98.6 °F (37 °C)]   Pulse:  [44-88]   Resp:  [0-32]   BP: ()/(42-67)   SpO2:  [92 %-100 %]   Arterial Line BP: (110-166)/(42-59)     I & O (Last 24H):  Intake/Output Summary (Last 24 hours) at 01/20/18 0847  Last data filed at 01/20/18 0600   Gross per 24 hour   Intake          2947.93 ml   Output             1254 ml   Net          1693.93 ml           Physical Exam:  General appearance: well developed, well nourished, no distress  Lungs:  diminished breath sounds bilaterally  Heart: regular rate and rhythm  Abdomen: soft, non-tender non-distented; bowel sounds normal; no masses,  no organomegaly  Extremities: no cyanosis or edema, or clubbing    Laboratory:  CBC:   Recent Labs  Lab 01/20/18  0245   WBC 18.98*   RBC 3.19*   HGB 9.3*   HCT 27.5*      MCV 86   MCH 29.2   MCHC 33.8     CMP:   Recent Labs  Lab 01/18/18  1225  01/20/18  0245   *  < > 165*  163*   CALCIUM 9.9  < > 7.4*  7.4*   ALBUMIN 3.2*  < > 2.2*   PROT 6.6  --   --      < > 135*  136   K 7.4*  < > 4.8  4.8   CO2 <5*  < > 18*  19*     < > 99  99   BUN 57*  < > 28*  28*   CREATININE 12.1*  < > 4.4*  4.4*   ALKPHOS 113  --   --    ALT 7*  --   --    AST 12  --   --    BILITOT 0.5  --   --    < > = values in this interval not displayed.    Imp/Plan    SOURAV - prerenal/ATN  Acute respiratory failure  GI bleed  DM type  2  Hypotension - ? hypovolumic  Anemia of acute loss    IVF  Continue present Rx  CMP in am      Trac T Le  1/20/2018

## 2018-01-20 NOTE — ASSESSMENT & PLAN NOTE
Appears to be PAF, currently in sinus bradycardia. Rhythm likely aggravated by electrolyte abnormalities. Echo with good EF. Add back IV amiodarone as tolerated by HR. Now on SC heparin

## 2018-01-20 NOTE — PROGRESS NOTES
Progress Note    Admit Date: 1/18/2018   LOS: 2 days     SUBJECTIVE:     Chief Complaint:  Following for coffee ground emesis    HPI: Patient's nurse has not reported any more bleeding. NG aspirate is bilious.  He was transfused yesterday and [facility] & [facility] has appropriate response.     Scheduled Meds:   calcium gluconate IVPB  1,000 mg Intravenous Once    ceFEPime (MAXIPIME) IVPB  1 g Intravenous Q12H    ciprofloxacin  400 mg Intravenous Q24H    heparin (porcine)  5,000 Units Subcutaneous Q8H    levothyroxine  100 mcg Intravenous Daily    lidocaine HCL 10 mg/ml (1%)  5 mL Other Once    methylPREDNISolone sodium succinate  40 mg Intravenous Q12H     Continuous Infusions:   sodium chloride 0.9% 100 mL/hr at 01/20/18 0945    dexmedetomidine (PRECEDEX) infusion 0.4 mcg/kg/hr (01/20/18 0803)    custom IV infusion builder (for pharmacist use only) Stopped (01/19/18 1922)    norepinephrine bitartrate-D5W 0.047 mcg/kg/min (01/20/18 0945)    pantoprazole 40 mg in dextrose 5 % 100 mL infusion (ready to mix system) 8 mg/hr (01/20/18 0953)    propofol 50 mcg/kg/min (01/20/18 0752)     PRN Meds:sodium chloride, [COMPLETED] calcium gluconate IVPB **AND** calcium gluconate IVPB, dextrose 50%, glucagon (human recombinant), heparin (porcine), insulin aspart, ondansetron, propofol, vancomycin (VANCOCIN) IVPB    Review of patient's allergies indicates:  No Known Allergies    Review of Systems  Constitutional- No fever or chills  Neuro- intubated  Could not be done as the patient is intubated.    OBJECTIVE:     Vital Signs (Most Recent)  Temp: 98.6 °F (37 °C) (01/20/18 0800)  Pulse: (!) 55 (01/20/18 0946)  Resp: 12 (01/20/18 0946)  BP: (!) 111/42 (01/20/18 0545)  SpO2: 100 % (01/20/18 0946)    Vital Signs Range (Last 24H):  Temp:  [94.7 °F (34.8 °C)-98.6 °F (37 °C)]   Pulse:  [44-88]   Resp:  [0-32]   BP: ()/(42-67)   SpO2:  [92 %-100 %]   Arterial Line BP: (110-166)/(42-59)     I & O (Last 24H):  Intake/Output Summary (Last  24 hours) at 01/20/18 1115  Last data filed at 01/20/18 0600   Gross per 24 hour   Intake          2497.64 ml   Output             1224 ml   Net          1273.64 ml       Physical Exam:  General- Intubated  HEENT- PERRLA, EOMI, OP clear, MMM  Neck- No JVD, Lymphadenopathy, Thyromegaly  CV- Regular rate and rhythm, No Murmur/junior/rubs  Resp- Air entry reduced   Abdomen- Non tender/non-distended, BS normoactive x4 quads, no HSM  Extrem- No cyanosis, clubbing, edema. Pulses 2+ and symmetric  Skin- No rashes, lesions, ulcers    Laboratory:  CBC:   Recent Labs  Lab 01/20/18  0245   WBC 18.98*   RBC 3.19*   HGB 9.3*   HCT 27.5*      MCV 86   MCH 29.2   MCHC 33.8     CMP:   Recent Labs  Lab 01/18/18  1225  01/20/18  0245   *  < > 165*  163*   CALCIUM 9.9  < > 7.4*  7.4*   ALBUMIN 3.2*  < > 2.2*   PROT 6.6  --   --      < > 135*  136   K 7.4*  < > 4.8  4.8   CO2 <5*  < > 18*  19*     < > 99  99   BUN 57*  < > 28*  28*   CREATININE 12.1*  < > 4.4*  4.4*   ALKPHOS 113  --   --    ALT 7*  --   --    AST 12  --   --    BILITOT 0.5  --   --    < > = values in this interval not displayed.  LFTs:   Recent Labs  Lab 01/18/18  1225  01/20/18  0245   ALT 7*  --   --    AST 12  --   --    ALKPHOS 113  --   --    BILITOT 0.5  --   --    PROT 6.6  --   --    ALBUMIN 3.2*  < > 2.2*   < > = values in this interval not displayed.  Coagulation: No results for input(s): PT, INR, APTT in the last 168 hours.    Diagnostic Results:  Labs: Reviewed  H & H is stable following transfusions    ASSESSMENT:     Patient Active Problem List   Diagnosis    Atrial fibrillation    Metabolic acidosis    Lactic acid acidosis    Hyperkalemia    Hypothermia    Paroxysmal tachycardia    Acute renal failure    Muscle wasting    Neutrophilic leukocytosis    Uremia    Encephalopathy, metabolic    Hyperphosphatemia    Elevated troponin    Sepsis    Acute respiratory failure    On mechanically assisted ventilation          PLAN:     No active GI bleeding reported.  Continue with PPI therapy.  Will follow with you.

## 2018-01-20 NOTE — CONSULTS
General Surgery Consult    Consulting physician: Toi  Reason for consult: Possible ischemic bowel    HPI: Mr. Jesus is a 68 yo man with history of HTN, diabetes, hypothyroidism, depression, who was admitted from a nursing home yesterday due to new onset atrial fibrillation, nausea/vomiting, and hypothermia. He was admitted to the ICU and was intubated to due inability to protect his airway. He had one episode of dark, coffee grind drainage from his NG tube, which has resolved. His Hgb dropped from 10.1 to 7.4 since yesterday. He is on Levophed 0.05 mcg/kg currently.    Past Medical History:  HTN  Diabetes  Hypothyroidism  Depression    Past Surgical History:  Unknown    Home Medications:  Unknown    Allergies:  None    Social History:  Social History   Substance Use Topics    Smoking status: Unknown If Ever Smoked    Smokeless tobacco: Not on file    Alcohol use Not on file     Family History:  History reviewed. No pertinent family history.    Review of Systems:  Unable to obtain as patient is intubated    Vitals:  Vitals:    01/19/18 1745 01/19/18 1800 01/19/18 1815 01/19/18 1830   BP:       Pulse: (!) 49 (!) 48 (!) 47 (!) 45   Resp: (!) 0 (!) 0 (!) 0 (!) 0   Temp:       TempSrc:       SpO2: 99% 99% 99% 99%   Weight:       Height:         Physical Exam:  General appearance: intubated, sedated  HEENT: NG tube with clear, non-bloody, non-bilious drainage, EOMI, anicteric, dry, oral mucosal membranes  CV: irregularly irregular  Pulm: symmetric chest expansion, mechanical breath sounds  Abd:  soft, non-distended  Neuro: non-focal, sedated  Skin: warm, dry, no rashes  Psych: unable to assess    Labs:  WBC 25.5, Hgb 7.4, Hct 22.5, plt 342  Na 134, K 5, BUN 31, Cr 5.5  Lactic acid >12-->7    Radiology:  CT abd/pelvis: Cholelithiasis with a contracted gallbladder. 5 mm nonobstructing left lower pole renal calculus. Bibasilar atelectasis/consolidation. Basilar pneumonia cannot be  excluded. Possible colonic wall thickening involving the ascending colon. This may be related to decompression of the colon, however colitis cannot be excluded. Collins catheter within a decompressed urinary bladder.    Assessment/Plan:  66 yo man with history of HTN, diabetes, hypothyroidism, depression. Patient's abdominal exam is benign and CT scan did not demonstrate any concerning findings from a surgical standpoint. Lactic acid is downtrending.    -Patient does not have any acute surgical issues  -Consult GI for possible EGD to rule out UGI bleed  -Protonix gtt  -Continue to trend lactic acid  -Fluid resuscitation  -Prophylactic antibiotics, continue septic work up  -Will continue to follow    Coral Verdin MD  General Surgery

## 2018-01-20 NOTE — PROGRESS NOTES
[facility]  Cardiology  Progress Note    Patient Name: ERNA Jesus  MRN: [id number]  Admission Date: 1/18/2018  Hospital Length of Stay: 2 days  Code Status: Full Code   Attending Physician: Giancarlo Cm MD   Primary Care Physician: No primary care provider on file.  Expected Discharge Date:   Principal Problem:Metabolic acidosis    Subjective:     Hospital Course:   Bradycardic yesterday. Rates improved now. Still having episodes of A-fib RVR  On vent    Echo 1/19/18    1 - Normal left ventricular systolic function (EF 60-65%).     2 - Concentric hypertrophy.     3 - Trivial mitral regurgitation.     4 - Trivial to mild tricuspid regurgitation.     Interval History:     Review of Systems   Unable to perform ROS: intubated     Objective:     Vital Signs (Most Recent):  Temp: 98.6 °F (37 °C) (01/20/18 0800)  Pulse: 82 (01/20/18 0830)  Resp: 15 (01/20/18 0830)  BP: (!) 111/42 (01/20/18 0545)  SpO2: 98 % (01/20/18 0830) Vital Signs (24h Range):  Temp:  [94.7 °F (34.8 °C)-98.6 °F (37 °C)] 98.6 °F (37 °C)  Pulse:  [44-88] 82  Resp:  [0-32] 15  SpO2:  [92 %-100 %] 98 %  BP: ()/(42-67) 111/42  Arterial Line BP: (110-166)/(42-59) 111/48     Weight: 65.8 kg (145 lb 1 oz)  Body mass index is 21.42 kg/m².     SpO2: 98 %  O2 Device (Oxygen Therapy): ventilator      Intake/Output Summary (Last 24 hours) at 01/20/18 0913  Last data filed at 01/20/18 0600   Gross per 24 hour   Intake          2947.93 ml   Output             1224 ml   Net          1723.93 ml       Lines/Drains/Airways     Central Venous Catheter Line                 Hemodialysis Catheter 01/18/18 1707 right femoral 1 day         Percutaneous Central Line Insertion/Assessment - triple lumen  01/18/18 1500 right internal jugular 1 day          Drain                 NG/OG Tube 01/18/18 1708 Nash sump 16 Fr. Center mouth 1 day         Urethral Catheter 01/18/18 1200 1 day          Airway                 Airway - Non-Surgical 01/18/18  1615 Endotracheal Tube 1 day          Arterial Line                 Arterial Line 01/19/18 1710 Right Radial less than 1 day          Peripheral Intravenous Line                 Peripheral IV - Double Lumen 01/18/18 1413 Right Wrist 1 day         Peripheral IV - Single Lumen 01/18/18 1430 Left Forearm 1 day                Physical Exam   Constitutional: He is oriented to person, place, and time. He appears well-developed and well-nourished.   HENT:   Head: Normocephalic and atraumatic.   Eyes: Conjunctivae are normal. Pupils are equal, round, and reactive to light.   Neck: Normal range of motion. Neck supple.   Cardiovascular: Normal rate, normal heart sounds and intact distal pulses.    Pulmonary/Chest: Effort normal and breath sounds normal.   Abdominal: Soft. Bowel sounds are normal.   Musculoskeletal: Normal range of motion.   Neurological: He is alert and oriented to person, place, and time.   Skin: Skin is warm and dry.       Significant Labs: All pertinent lab results from the last 24 hours have been reviewed.    Significant Imaging: Echocardiogram:   2D echo with color flow doppler:   Results for orders placed or performed during the hospital encounter of 01/18/18   2D echo with color flow doppler   Result Value Ref Range    EF 60 55 - 65    Mitral Valve Regurgitation TRIVIAL     Diastolic Dysfunction No     Est. PA Systolic Pressure 31.09     Tricuspid Valve Regurgitation TRIVIAL TO MILD      Assessment and Plan:     Brief HPI:     * Metabolic acidosis             Acute respiratory failure             Elevated troponin    Suspect related to demand ischemia and ARF. Ischemic evaluation when he stabilizes        Encephalopathy, metabolic             Acute renal failure             Paroxysmal tachycardia    Appears to be PAF, currently in sinus bradycardia. Rhythm likely aggravated by electrolyte abnormalities. Echo with good EF. Add back IV amiodarone as tolerated by HR. Now on SC heparin        Lactic acid  acidosis                 VTE Risk Mitigation         Ordered     heparin (porcine) injection 5,000 Units  Every 8 hours     Route:  Subcutaneous        01/18/18 1551     Place sequential compression device  Until discontinued      01/18/18 2016     heparin (porcine) injection 5,000 Units  As needed (PRN)     Route:  Intra-Catheter        01/18/18 1757     Medium Risk of VTE  Once      01/18/18 1551          Arley Rajput MD  Cardiology  [facility]

## 2018-01-21 LAB
ALBUMIN SERPL BCP-MCNC: 2.3 G/DL
ALP SERPL-CCNC: 78 U/L
ALT SERPL W/O P-5'-P-CCNC: 10 U/L
ANION GAP SERPL CALC-SCNC: 12 MMOL/L
ANION GAP SERPL CALC-SCNC: 12 MMOL/L
AST SERPL-CCNC: 20 U/L
BASOPHILS # BLD AUTO: 0 K/UL
BASOPHILS NFR BLD: 0 %
BILIRUB SERPL-MCNC: 0.9 MG/DL
BUN SERPL-MCNC: 39 MG/DL
BUN SERPL-MCNC: 39 MG/DL
CALCIUM SERPL-MCNC: 7.1 MG/DL
CALCIUM SERPL-MCNC: 7.1 MG/DL
CHLORIDE SERPL-SCNC: 102 MMOL/L
CHLORIDE SERPL-SCNC: 102 MMOL/L
CO2 SERPL-SCNC: 21 MMOL/L
CO2 SERPL-SCNC: 21 MMOL/L
CREAT SERPL-MCNC: 5.1 MG/DL
CREAT SERPL-MCNC: 5.1 MG/DL
DIFFERENTIAL METHOD: ABNORMAL
EOSINOPHIL # BLD AUTO: 0 K/UL
EOSINOPHIL NFR BLD: 0 %
ERYTHROCYTE [DISTWIDTH] IN BLOOD BY AUTOMATED COUNT: 13.9 %
EST. GFR  (AFRICAN AMERICAN): 13 ML/MIN/1.73 M^2
EST. GFR  (AFRICAN AMERICAN): 13 ML/MIN/1.73 M^2
EST. GFR  (NON AFRICAN AMERICAN): 11 ML/MIN/1.73 M^2
EST. GFR  (NON AFRICAN AMERICAN): 11 ML/MIN/1.73 M^2
GLUCOSE SERPL-MCNC: 192 MG/DL
GLUCOSE SERPL-MCNC: 192 MG/DL
HCT VFR BLD AUTO: 28 %
HGB BLD-MCNC: 9.4 G/DL
LYMPHOCYTES # BLD AUTO: 0.2 K/UL
LYMPHOCYTES NFR BLD: 1 %
MCH RBC QN AUTO: 29.4 PG
MCHC RBC AUTO-ENTMCNC: 33.6 G/DL
MCV RBC AUTO: 88 FL
MONOCYTES # BLD AUTO: 0.8 K/UL
MONOCYTES NFR BLD: 4 %
NEUTROPHILS # BLD AUTO: 19.4 K/UL
NEUTROPHILS NFR BLD: 94.7 %
PLATELET # BLD AUTO: 189 K/UL
PMV BLD AUTO: 10.6 FL
POCT GLUCOSE: 216 MG/DL (ref 70–110)
POCT GLUCOSE: 223 MG/DL (ref 70–110)
POCT GLUCOSE: 224 MG/DL (ref 70–110)
POCT GLUCOSE: 244 MG/DL (ref 70–110)
POTASSIUM SERPL-SCNC: 4.1 MMOL/L
POTASSIUM SERPL-SCNC: 4.1 MMOL/L
PROT SERPL-MCNC: 4.4 G/DL
RBC # BLD AUTO: 3.2 M/UL
SODIUM SERPL-SCNC: 135 MMOL/L
SODIUM SERPL-SCNC: 135 MMOL/L
TROPONIN I SERPL DL<=0.01 NG/ML-MCNC: 0.36 NG/ML
TROPONIN I SERPL DL<=0.01 NG/ML-MCNC: 0.44 NG/ML
TROPONIN I SERPL DL<=0.01 NG/ML-MCNC: 0.52 NG/ML
WBC # BLD AUTO: 20.5 K/UL

## 2018-01-21 PROCEDURE — 63600175 PHARM REV CODE 636 W HCPCS: Performed by: INTERNAL MEDICINE

## 2018-01-21 PROCEDURE — 25000003 PHARM REV CODE 250: Performed by: INTERNAL MEDICINE

## 2018-01-21 PROCEDURE — 27000221 HC OXYGEN, UP TO 24 HOURS

## 2018-01-21 PROCEDURE — 80053 COMPREHEN METABOLIC PANEL: CPT

## 2018-01-21 PROCEDURE — 93005 ELECTROCARDIOGRAM TRACING: CPT

## 2018-01-21 PROCEDURE — 85025 COMPLETE CBC W/AUTO DIFF WBC: CPT

## 2018-01-21 PROCEDURE — 94761 N-INVAS EAR/PLS OXIMETRY MLT: CPT

## 2018-01-21 PROCEDURE — 63600175 PHARM REV CODE 636 W HCPCS: Performed by: FAMILY MEDICINE

## 2018-01-21 PROCEDURE — 20000000 HC ICU ROOM

## 2018-01-21 PROCEDURE — 25000003 PHARM REV CODE 250: Performed by: FAMILY MEDICINE

## 2018-01-21 PROCEDURE — 84484 ASSAY OF TROPONIN QUANT: CPT | Mod: 91

## 2018-01-21 PROCEDURE — 63600175 PHARM REV CODE 636 W HCPCS: Performed by: EMERGENCY MEDICINE

## 2018-01-21 PROCEDURE — C9113 INJ PANTOPRAZOLE SODIUM, VIA: HCPCS | Performed by: INTERNAL MEDICINE

## 2018-01-21 PROCEDURE — 99232 SBSQ HOSP IP/OBS MODERATE 35: CPT | Mod: ,,, | Performed by: INTERNAL MEDICINE

## 2018-01-21 PROCEDURE — 93010 ELECTROCARDIOGRAM REPORT: CPT | Mod: ,,, | Performed by: INTERNAL MEDICINE

## 2018-01-21 PROCEDURE — 36415 COLL VENOUS BLD VENIPUNCTURE: CPT

## 2018-01-21 PROCEDURE — 25000003 PHARM REV CODE 250: Performed by: HOSPITALIST

## 2018-01-21 RX ADMIN — SODIUM CHLORIDE: 0.9 INJECTION, SOLUTION INTRAVENOUS at 06:01

## 2018-01-21 RX ADMIN — INSULIN ASPART 4 UNITS: 100 INJECTION, SOLUTION INTRAVENOUS; SUBCUTANEOUS at 01:01

## 2018-01-21 RX ADMIN — DEXTROSE MONOHYDRATE 8 MG/HR: 5 INJECTION INTRAVENOUS at 12:01

## 2018-01-21 RX ADMIN — HEPARIN SODIUM 5000 UNITS: 5000 INJECTION, SOLUTION INTRAVENOUS; SUBCUTANEOUS at 01:01

## 2018-01-21 RX ADMIN — LEVOTHYROXINE SODIUM ANHYDROUS 100 MCG: 100 INJECTION, POWDER, LYOPHILIZED, FOR SOLUTION INTRAVENOUS at 09:01

## 2018-01-21 RX ADMIN — FOLIC ACID: 5 INJECTION, SOLUTION INTRAMUSCULAR; INTRAVENOUS; SUBCUTANEOUS at 11:01

## 2018-01-21 RX ADMIN — HEPARIN SODIUM 5000 UNITS: 5000 INJECTION, SOLUTION INTRAVENOUS; SUBCUTANEOUS at 05:01

## 2018-01-21 RX ADMIN — INSULIN ASPART 2 UNITS: 100 INJECTION, SOLUTION INTRAVENOUS; SUBCUTANEOUS at 12:01

## 2018-01-21 RX ADMIN — AMIODARONE HYDROCHLORIDE: 50 INJECTION, SOLUTION INTRAVENOUS at 11:01

## 2018-01-21 RX ADMIN — DEXTROSE MONOHYDRATE 8 MG/HR: 5 INJECTION INTRAVENOUS at 08:01

## 2018-01-21 RX ADMIN — DEXTROSE MONOHYDRATE 8 MG/HR: 5 INJECTION INTRAVENOUS at 04:01

## 2018-01-21 RX ADMIN — CEFEPIME 1 G: 1 INJECTION, POWDER, FOR SOLUTION INTRAMUSCULAR; INTRAVENOUS at 01:01

## 2018-01-21 RX ADMIN — INSULIN ASPART 4 UNITS: 100 INJECTION, SOLUTION INTRAVENOUS; SUBCUTANEOUS at 06:01

## 2018-01-21 RX ADMIN — CIPROFLOXACIN 400 MG: 2 INJECTION, SOLUTION INTRAVENOUS at 01:01

## 2018-01-21 RX ADMIN — METHYLPREDNISOLONE SODIUM SUCCINATE 40 MG: 40 INJECTION, POWDER, FOR SOLUTION INTRAMUSCULAR; INTRAVENOUS at 09:01

## 2018-01-21 RX ADMIN — SODIUM CHLORIDE: 0.9 INJECTION, SOLUTION INTRAVENOUS at 05:01

## 2018-01-21 RX ADMIN — DEXTROSE MONOHYDRATE 8 MG/HR: 5 INJECTION INTRAVENOUS at 09:01

## 2018-01-21 RX ADMIN — HEPARIN SODIUM 5000 UNITS: 5000 INJECTION, SOLUTION INTRAVENOUS; SUBCUTANEOUS at 09:01

## 2018-01-21 RX ADMIN — AMIODARONE HYDROCHLORIDE 150 MG: 1.5 INJECTION, SOLUTION INTRAVENOUS at 11:01

## 2018-01-21 RX ADMIN — INSULIN ASPART 4 UNITS: 100 INJECTION, SOLUTION INTRAVENOUS; SUBCUTANEOUS at 05:01

## 2018-01-21 RX ADMIN — DEXTROSE MONOHYDRATE 8 MG/HR: 5 INJECTION INTRAVENOUS at 03:01

## 2018-01-21 NOTE — PROGRESS NOTES
[facility]  Cardiology  Progress Note    Patient Name: ERNA Jesus  MRN: [id number]  Admission Date: 1/18/2018  Hospital Length of Stay: 3 days  Code Status: Full Code   Attending Physician: Giancarlo Cm MD   Primary Care Physician: No primary care provider on file.  Expected Discharge Date:   Principal Problem:Metabolic acidosis    Subjective:     Hospital Course:   Bradycardic yesterday. Rates improved now. Still having episodes of A-fib RVR  Extubated  Appears to understand but responds slowly    Echo 1/19/18    1 - Normal left ventricular systolic function (EF 60-65%).     2 - Concentric hypertrophy.     3 - Trivial mitral regurgitation.     4 - Trivial to mild tricuspid regurgitation.     Interval History:     Review of Systems   Unable to perform ROS: acuity of condition     Objective:     Vital Signs (Most Recent):  Temp: 99.9 °F (37.7 °C) (01/21/18 1115)  Pulse: (!) 123 (01/21/18 1200)  Resp: (!) 21 (01/21/18 1200)  BP: 119/70 (01/21/18 1200)  SpO2: 98 % (01/21/18 1200) Vital Signs (24h Range):  Temp:  [98.2 °F (36.8 °C)-99.9 °F (37.7 °C)] 99.9 °F (37.7 °C)  Pulse:  [] 123  Resp:  [15-26] 21  SpO2:  [97 %-100 %] 98 %  BP: (119-158)/(60-79) 119/70  Arterial Line BP: (149-172)/(56-73) 172/69     Weight: 69 kg (152 lb 1.9 oz)  Body mass index is 22.46 kg/m².     SpO2: 98 %  O2 Device (Oxygen Therapy): nasal cannula      Intake/Output Summary (Last 24 hours) at 01/21/18 1231  Last data filed at 01/21/18 0800   Gross per 24 hour   Intake           2982.5 ml   Output             2910 ml   Net             72.5 ml       Lines/Drains/Airways     Central Venous Catheter Line                 Hemodialysis Catheter 01/18/18 1707 right femoral 2 days         Percutaneous Central Line Insertion/Assessment - triple lumen  01/18/18 1500 right internal jugular 2 days          Drain                 Urethral Catheter 01/18/18 1200 3 days          Arterial Line                 Arterial Line  01/19/18 1710 Right Radial 1 day          Peripheral Intravenous Line                 Peripheral IV - Double Lumen 01/18/18 1413 Right Wrist 2 days         Peripheral IV - Single Lumen 01/18/18 1430 Left Forearm 2 days                Physical Exam   Constitutional: He is oriented to person, place, and time. He appears well-developed and well-nourished.   HENT:   Head: Normocephalic and atraumatic.   Eyes: Conjunctivae are normal. Pupils are equal, round, and reactive to light.   Neck: Normal range of motion. Neck supple.   Cardiovascular: Normal rate, normal heart sounds and intact distal pulses.    Pulmonary/Chest: Effort normal and breath sounds normal.   Abdominal: Soft. Bowel sounds are normal.   Musculoskeletal: Normal range of motion.   Neurological: He is alert and oriented to person, place, and time.   Skin: Skin is warm and dry.       Significant Labs: All pertinent lab results from the last 24 hours have been reviewed.    Significant Imaging: Echocardiogram:   2D echo with color flow doppler:   Results for orders placed or performed during the hospital encounter of 01/18/18   2D echo with color flow doppler   Result Value Ref Range    EF 60 55 - 65    Mitral Valve Regurgitation TRIVIAL     Diastolic Dysfunction No     Est. PA Systolic Pressure 31.09     Tricuspid Valve Regurgitation TRIVIAL TO MILD      Assessment and Plan:     Brief HPI:     * Metabolic acidosis             Acute respiratory failure             Elevated troponin    Suspect related to demand ischemia and ARF. Ischemic evaluation when he stabilizes        Encephalopathy, metabolic             Acute renal failure             Paroxysmal tachycardia    Appears to be PAF, currently in sinus bradycardia. Rhythm likely aggravated by electrolyte abnormalities. Echo with good EF. Add back IV amiodarone as tolerated by HR. Now on SC heparin        Lactic acid acidosis                 VTE Risk Mitigation         Ordered     heparin (porcine)  injection 5,000 Units  Every 8 hours     Route:  Subcutaneous        01/18/18 1551     Place sequential compression device  Until discontinued      01/18/18 2016     heparin (porcine) injection 5,000 Units  As needed (PRN)     Route:  Intra-Catheter        01/18/18 1757     Medium Risk of VTE  Once      01/18/18 1551          Arley Rajput MD  Cardiology  [facility]

## 2018-01-21 NOTE — NURSING
1123- Patient converted to Afib RVR while performing EKG for tachycardia. Sabrina Freire NP notified. Cardiology also made aware.

## 2018-01-21 NOTE — PROGRESS NOTES
Progress Note    Admit Date: 1/18/2018   LOS: 3 days     SUBJECTIVE:     Follow-up For:  Acute Renal failure with severe metabolic acidosis    Scheduled Meds:   calcium gluconate IVPB  1,000 mg Intravenous Once    ceFEPime (MAXIPIME) IVPB  1 g Intravenous Q12H    ciprofloxacin  400 mg Intravenous Q24H    heparin (porcine)  5,000 Units Subcutaneous Q8H    levothyroxine  100 mcg Intravenous Daily    lidocaine HCL 10 mg/ml (1%)  5 mL Other Once    methylPREDNISolone sodium succinate  40 mg Intravenous Q12H    Banana bag   Intravenous Daily     Continuous Infusions:   sodium chloride 0.9% 100 mL/hr at 01/21/18 0700    dexmedetomidine (PRECEDEX) infusion Stopped (01/20/18 1600)    custom IV infusion builder (for pharmacist use only) Stopped (01/19/18 1922)    norepinephrine bitartrate-D5W Stopped (01/20/18 1000)    pantoprazole 40 mg in dextrose 5 % 100 mL infusion (ready to mix system) 8 mg/hr (01/21/18 0904)    propofol Stopped (01/20/18 1100)     PRN Meds:sodium chloride, acetaminophen, [COMPLETED] calcium gluconate IVPB **AND** calcium gluconate IVPB, dextrose 50%, glucagon (human recombinant), heparin (porcine), insulin aspart, ondansetron, propofol, vancomycin (VANCOCIN) IVPB    Review of patient's allergies indicates:  No Known Allergies    Review of Systems  Constitutional: positive for fatigue  Eyes: negative  Ears, nose, mouth, throat, and face: negative  Respiratory: positive for cough  Cardiovascular: positive for palpitations and three - four beat runs of V-tach  Gastrointestinal: negative for abdominal pain, melena, nausea and vomiting  Genitourinary:negative, + bloody stool  Musculoskeletal:positive for myalgias  Behavioral/Psych: negative  Endocrine: negative    OBJECTIVE:     Vital Signs (Most Recent)  Temp: 98.3 °F (36.8 °C) (01/21/18 0315)  Pulse: 94 (01/21/18 0750)  Resp: 19 (01/21/18 0750)  BP: 130/66 (01/21/18 0700)  SpO2: 99 % (01/21/18 0750)    Vital Signs Range (Last 24H):  Temp:   [98.2 °F (36.8 °C)-98.7 °F (37.1 °C)]   Pulse:  []   Resp:  [14-26]   BP: (124-158)/(60-79)   SpO2:  [98 %-100 %]   Arterial Line BP: (149-172)/(56-73)     I & O (Last 24H):  Intake/Output Summary (Last 24 hours) at 01/21/18 1121  Last data filed at 01/21/18 0700   Gross per 24 hour   Intake           2862.5 ml   Output             3020 ml   Net           -157.5 ml     Physical Exam:  General: no distress  HENT: Head:normocephalic, atraumatic. Ears:bilateral TM's and external ear canals normal. Nose: Nares normal. Septum midline. Mucosa normal. No drainage or sinus tenderness., no discharge. Throat: lips, mucosa, and tongue normal; teeth and gums normal and no throat erythema.  Eyes: conjunctivae/corneas clear. PERRL.   Neck: supple, symmetrical, trachea midline, no JVD and thyroid not enlarged, symmetric, no tenderness/mass/nodules  Lungs:  clear to auscultation bilaterally, normal respiratory effort and rhonchi bilaterally  Cardiovascular: Heart: irregularly irregular rhythm and tachy eryn episoded=s. Chest Wall: no tenderness. Extremities: no cyanosis or edema, or clubbing. Pulses: 2+ and symmetric.  Abdomen/Rectal: Abdomen: soft, non-tender non-distented; bowel sounds normal; no masses,  no organomegaly. Rectal: not examined  Skin: Skin color, texture, turgor normal. No rashes or lesions  Musculoskeletal:no clubbing, cyanosis  Neurologic: Mental status: Alert, oriented, thought content appropriate    Laboratory:  CBC:   Recent Labs  Lab 01/21/18  0245   WBC 20.50*   RBC 3.20*   HGB 9.4*   HCT 28.0*      MCV 88   MCH 29.4   MCHC 33.6     BMP:   Recent Labs  Lab 01/20/18 2153 01/21/18  0245   GLU  --  192*  192*   NA  --  135*  135*   K  --  4.1  4.1   CL  --  102  102   CO2  --  21*  21*   BUN  --  39*  39*   CREATININE  --  5.1*  5.1*   CALCIUM  --  7.1*  7.1*   MG 2.0  --      CMP:   Recent Labs  Lab 01/21/18  0245   *  192*   CALCIUM 7.1*  7.1*   ALBUMIN 2.3*   PROT 4.4*   NA  135*  135*   K 4.1  4.1   CO2 21*  21*     102   BUN 39*  39*   CREATININE 5.1*  5.1*   ALKPHOS 78   ALT 10   AST 20   BILITOT 0.9     LFTs:   Recent Labs  Lab 01/21/18  0245   ALT 10   AST 20   ALKPHOS 78   BILITOT 0.9   PROT 4.4*   ALBUMIN 2.3*     Coagulation: No results for input(s): LABPROT, INR, APTT in the last 168 hours.  Cardiac markers:   Recent Labs  Lab 01/19/18  0307  01/21/18  0916   CPKMB 19.8*  --   --    TROPONINI 1.557*  < > 0.442*   < > = values in this interval not displayed.  ABGs:   Recent Labs  Lab 01/20/18  1202   PH 7.462*   PCO2 31.9*   PO2 112*   HCO3 22.8*   POCSATURATED 99   BE -1     Microbiology Results (last 7 days)     Procedure Component Value Units Date/Time    Blood culture [[id number]] Collected:  01/18/18 1330    Order Status:  Completed Specimen:  Blood from Peripheral, Antecubital, Left Updated:  01/20/18 1503     Blood Culture, Routine No Growth to date     Blood Culture, Routine No Growth to date     Blood Culture, Routine No Growth to date    Blood culture [[id number]] Collected:  01/18/18 1315    Order Status:  Completed Specimen:  Blood from Peripheral, Antecubital, Left Updated:  01/20/18 1503     Blood Culture, Routine No Growth to date     Blood Culture, Routine No Growth to date     Blood Culture, Routine No Growth to date    Urine culture [[id number]] Collected:  01/18/18 1429    Order Status:  Completed Specimen:  Urine from Urine, Catheterized Updated:  01/20/18 0840     Urine Culture, Routine No growth        Specimen (12h ago through future)    None          Recent Labs  Lab 01/18/18  1429   COLORU Yellow   SPECGRAV 1.010   PHUR 5.0   PROTEINUA 1+*   BACTERIA Few*   NITRITE Negative   LEUKOCYTESUR Negative   UROBILINOGEN Negative   HYALINECASTS 1         Diagnostic Results:  Labs: Reviewed  ECG: Reviewed  X-Ray: Reviewed  CT: Reviewed  reviewed    ASSESSMENT/PLAN:     .1. Shock -resolved possible Septic Shock vs profound dehydration with ARF  Blood  cultures negative so far  2. Acute renal failure most likely s/t infection or Dehydration -improving slowly  3. Severe Metabolic acidosis - s/t renal failure -improving off HD  4. New on set A-Fib - restart Amiodarone monitor rate   5. Troponin elevated - will trend out and consult cards maybe s/t renal impairment  6. Essential HTN - stable  7. Hyperkalemia - s/t acute renal failure - resolved  8. Normocytic Anemia - will investigate -s/p transfusion - stable  9. Hypothermia - s/t Shock - resolved  10. H/O T2DM - continue s/s insulin  11. Leukocytosis -blood cultures negative so far  12. Hypothyroidism by history. -monitor  12. Throat discomfort with swallowing which is mostly s/t trauma with intubation  Will get ST eval    Plan: continue plan as above will order swallow study.

## 2018-01-21 NOTE — SUBJECTIVE & OBJECTIVE
Interval History:     Review of Systems   Unable to perform ROS: acuity of condition     Objective:     Vital Signs (Most Recent):  Temp: 99.9 °F (37.7 °C) (01/21/18 1115)  Pulse: (!) 123 (01/21/18 1200)  Resp: (!) 21 (01/21/18 1200)  BP: 119/70 (01/21/18 1200)  SpO2: 98 % (01/21/18 1200) Vital Signs (24h Range):  Temp:  [98.2 °F (36.8 °C)-99.9 °F (37.7 °C)] 99.9 °F (37.7 °C)  Pulse:  [] 123  Resp:  [15-26] 21  SpO2:  [97 %-100 %] 98 %  BP: (119-158)/(60-79) 119/70  Arterial Line BP: (149-172)/(56-73) 172/69     Weight: 69 kg (152 lb 1.9 oz)  Body mass index is 22.46 kg/m².     SpO2: 98 %  O2 Device (Oxygen Therapy): nasal cannula      Intake/Output Summary (Last 24 hours) at 01/21/18 1231  Last data filed at 01/21/18 0800   Gross per 24 hour   Intake           2982.5 ml   Output             2910 ml   Net             72.5 ml       Lines/Drains/Airways     Central Venous Catheter Line                 Hemodialysis Catheter 01/18/18 1707 right femoral 2 days         Percutaneous Central Line Insertion/Assessment - triple lumen  01/18/18 1500 right internal jugular 2 days          Drain                 Urethral Catheter 01/18/18 1200 3 days          Arterial Line                 Arterial Line 01/19/18 1710 Right Radial 1 day          Peripheral Intravenous Line                 Peripheral IV - Double Lumen 01/18/18 1413 Right Wrist 2 days         Peripheral IV - Single Lumen 01/18/18 1430 Left Forearm 2 days                Physical Exam   Constitutional: He is oriented to person, place, and time. He appears well-developed and well-nourished.   HENT:   Head: Normocephalic and atraumatic.   Eyes: Conjunctivae are normal. Pupils are equal, round, and reactive to light.   Neck: Normal range of motion. Neck supple.   Cardiovascular: Normal rate, normal heart sounds and intact distal pulses.    Pulmonary/Chest: Effort normal and breath sounds normal.   Abdominal: Soft. Bowel sounds are normal.   Musculoskeletal: Normal  range of motion.   Neurological: He is alert and oriented to person, place, and time.   Skin: Skin is warm and dry.       Significant Labs: All pertinent lab results from the last 24 hours have been reviewed.    Significant Imaging: Echocardiogram:   2D echo with color flow doppler:   Results for orders placed or performed during the hospital encounter of 01/18/18   2D echo with color flow doppler   Result Value Ref Range    EF 60 55 - 65    Mitral Valve Regurgitation TRIVIAL     Diastolic Dysfunction No     Est. PA Systolic Pressure 31.09     Tricuspid Valve Regurgitation TRIVIAL TO MILD

## 2018-01-21 NOTE — PLAN OF CARE
Problem: Patient Care Overview  Goal: Plan of Care Review  Outcome: Ongoing (interventions implemented as appropriate)  Plan of care reviewed with pt. No falls, skin assessed. Remains in ICU on protonix gtt. Adequate UOP. NS infusing at 100 mL/h. Tolerating 2 L NC. Labs monitored, BUN creat remain elevated. Afebrile. VSS will continue to monitor.

## 2018-01-21 NOTE — PROGRESS NOTES
Progress Note    Admit Date: 1/18/2018   LOS: 3 days     SUBJECTIVE:     Follow-up For: AMS    Scheduled Meds:   calcium gluconate IVPB  1,000 mg Intravenous Once    ceFEPime (MAXIPIME) IVPB  1 g Intravenous Q12H    ciprofloxacin  400 mg Intravenous Q24H    heparin (porcine)  5,000 Units Subcutaneous Q8H    levothyroxine  100 mcg Intravenous Daily    lidocaine HCL 10 mg/ml (1%)  5 mL Other Once    methylPREDNISolone sodium succinate  40 mg Intravenous Q12H    Banana bag   Intravenous Daily     Continuous Infusions:   sodium chloride 0.9% 100 mL/hr at 01/21/18 0800    dexmedetomidine (PRECEDEX) infusion Stopped (01/20/18 1600)    custom IV infusion builder (for pharmacist use only) 16.7 mL/hr at 01/21/18 1157    norepinephrine bitartrate-D5W Stopped (01/20/18 1000)    pantoprazole 40 mg in dextrose 5 % 100 mL infusion (ready to mix system) 8 mg/hr (01/21/18 0904)    propofol Stopped (01/20/18 1100)     PRN Meds:sodium chloride, acetaminophen, [COMPLETED] calcium gluconate IVPB **AND** calcium gluconate IVPB, dextrose 50%, glucagon (human recombinant), heparin (porcine), insulin aspart, ondansetron, propofol, vancomycin (VANCOCIN) IVPB    Review of patient's allergies indicates:  No Known Allergies    Review of Systems  Constitutional: negative for chills, fevers and sweats  Eyes: negative  Cardiovascular: positive for palpitations and small runs of VT 3-4 beat  Gastrointestinal: negative for diarrhea, melena and nausea  Genitourinary:negative except for good urine outpt  Neurological: positive for weakness and speech slow    OBJECTIVE:     Vital Signs (Most Recent)  Temp: 99.9 °F (37.7 °C) (01/21/18 1115)  Pulse: (!) 123 (01/21/18 1200)  Resp: (!) 21 (01/21/18 1200)  BP: 119/70 (01/21/18 1200)  SpO2: 98 % (01/21/18 1200)    Vital Signs Range (Last 24H):  Temp:  [98.2 °F (36.8 °C)-99.9 °F (37.7 °C)]   Pulse:  []   Resp:  [15-26]   BP: (119-158)/(60-79)   SpO2:  [97 %-100 %]   Arterial Line BP:  (149172)/(56-73)     I & O (Last 24H):  Intake/Output Summary (Last 24 hours) at 01/21/18 1236  Last data filed at 01/21/18 0800   Gross per 24 hour   Intake           2982.5 ml   Output             2910 ml   Net             72.5 ml     Physical Exam:  General: no distress  Eyes: conjunctivae/corneas clear. PERRL.   Lungs:  clear to auscultation bilaterally and normal respiratory effort  Cardiovascular: Heart: regular rate and rhythm, S1, S2 normal, no murmur, click, rub or gallop. Chest Wall: no tenderness. Extremities: no cyanosis or edema, or clubbing. Pulses: 2+ and symmetric.  Abdomen/Rectal: Abdomen: soft, non-tender non-distented; bowel sounds normal; no masses,  no organomegaly. Rectal: not examined  Musculoskeletal:no clubbing, cyanosis and redness and mild swelling to right great toe  Neurologic: Mental status: alertness: slow verbal responses, does answers some questions appropriately    Laboratory:  CBC:   BMP:   CMP:   LFTs:   Coagulation: No results for input(s): LABPROT, INR, APTT in the last 168 hours.  Cardiac markers:   ABGs:   Recent Labs  Lab 01/20/18  1202   PH 7.462*   PCO2 31.9*   PO2 112*   HCO3 22.8*   POCSATURATED 99   BE -1     Microbiology Results (last 7 days)     Procedure Component Value Units Date/Time    Blood culture [[id number]] Collected:  01/18/18 1315    Order Status:  Completed Specimen:  Blood from Peripheral, Antecubital, Left Updated:  01/21/18 1503     Blood Culture, Routine No Growth to date     Blood Culture, Routine No Growth to date     Blood Culture, Routine No Growth to date     Blood Culture, Routine No Growth to date    Blood culture [[id number]] Collected:  01/18/18 1330    Order Status:  Completed Specimen:  Blood from Peripheral, Antecubital, Left Updated:  01/21/18 1503     Blood Culture, Routine No Growth to date     Blood Culture, Routine No Growth to date     Blood Culture, Routine No Growth to date     Blood Culture, Routine No Growth to date    Urine culture  [[id number]] Collected:  01/18/18 1429    Order Status:  Completed Specimen:  Urine from Urine, Catheterized Updated:  01/20/18 0840     Urine Culture, Routine No growth        Specimen (12h ago through future)    None          Recent Labs  Lab 01/18/18  1429   COLORU Yellow   SPECGRAV 1.010   PHUR 5.0   PROTEINUA 1+*   BACTERIA Few*   NITRITE Negative   LEUKOCYTESUR Negative   UROBILINOGEN Negative   HYALINECASTS 1         Diagnostic Results:  Labs: Reviewed  ECG: Reviewed  reviewed    1. Shock -resolved possible Septic Shock vs profound dehydration with ARF  Blood cultures negative so far  2. Acute renal failure most likely s/t infection or Dehydration -improving slowly  3. Severe Metabolic acidosis - s/t renal failure -improving off HD  4. New on set A-Fib - restart Amiodarone monitor rate   5. Troponin elevated - will trend out and consult cards maybe s/t renal impairment  6. Essential HTN - stable  7. Hyperkalemia - s/t acute renal failure - resolved  8. Normocytic Anemia - will investigate -s/p transfusion - stable  9. Hypothermia - s/t Shock - resolved  10. H/O T2DM - continue s/s insulin  11. Leukocytosis -blood cultures negative so far  12. Hypothyroidism by history. -monitor  13. Throat discomfort with swallowing which is mostly s/t trauma with intubation  Will get ST eval  14. Tachy arrhthymias persist will inform cards           Plan: Continue Current.

## 2018-01-21 NOTE — PROGRESS NOTES
Progress Note    Admit Date: 1/18/2018   LOS: 3 days     SUBJECTIVE:     Chief Complaint:  Following patient for coffee ground emesis    HPI: Patient has not had any more bleeding.  No black or bloody stool per the nurse on duty.  He has now been extubated. He denied abdominal pain.  No nausea or vomiting reported.  He is NPO.    Scheduled Meds:   calcium gluconate IVPB  1,000 mg Intravenous Once    ceFEPime (MAXIPIME) IVPB  1 g Intravenous Q12H    ciprofloxacin  400 mg Intravenous Q24H    heparin (porcine)  5,000 Units Subcutaneous Q8H    levothyroxine  100 mcg Intravenous Daily    lidocaine HCL 10 mg/ml (1%)  5 mL Other Once    methylPREDNISolone sodium succinate  40 mg Intravenous Q12H    Banana bag   Intravenous Daily     Continuous Infusions:   sodium chloride 0.9% 100 mL/hr at 01/21/18 0700    dexmedetomidine (PRECEDEX) infusion Stopped (01/20/18 1600)    custom IV infusion builder (for pharmacist use only) Stopped (01/19/18 1922)    norepinephrine bitartrate-D5W Stopped (01/20/18 1000)    pantoprazole 40 mg in dextrose 5 % 100 mL infusion (ready to mix system) 8 mg/hr (01/21/18 0904)    propofol Stopped (01/20/18 1100)     PRN Meds:sodium chloride, acetaminophen, [COMPLETED] calcium gluconate IVPB **AND** calcium gluconate IVPB, dextrose 50%, glucagon (human recombinant), heparin (porcine), insulin aspart, ondansetron, propofol, vancomycin (VANCOCIN) IVPB    Review of patient's allergies indicates:  No Known Allergies    Review of Systems: Slow in answering questions.  Constitutional- No fever or chills  Neuro- No change in mental status  CV- No Chest Pain or Palpitations  Resp- No Cough or SOB  GI- No abdominal pain or have nausea or vomiting  Extrem- No swelling of feet    OBJECTIVE:     Vital Signs (Most Recent)  Temp: 98.3 °F (36.8 °C) (01/21/18 0315)  Pulse: 94 (01/21/18 0750)  Resp: 19 (01/21/18 0750)  BP: 130/66 (01/21/18 0700)  SpO2: 99 % (01/21/18 0750)    Vital Signs Range (Last  24H):  Temp:  [98.2 °F (36.8 °C)-98.7 °F (37.1 °C)]   Pulse:  []   Resp:  [13-26]   BP: (124-158)/(60-79)   SpO2:  [98 %-100 %]   Arterial Line BP: (122-172)/(45-73)     I & O (Last 24H):  Intake/Output Summary (Last 24 hours) at 01/21/18 1039  Last data filed at 01/21/18 0700   Gross per 24 hour   Intake          2960.91 ml   Output             3110 ml   Net          -149.09 ml       Physical Exam:  General- Patient is slow in answering questions but appears alert.  HEENT- PERRLA, EOMI, OP clear, MMM  Neck- No JVD, Lymphadenopathy, Thyromegaly  CV- Regular rate and rhythm, No Murmur/junior/rubs  Resp- Lungs CTA Bilaterally, No increased WOB  Abdomen- Non tender/non-distended, BS normoactive x4 quads, no HSM  Extrem- No cyanosis, clubbing, edema. Pulses 2+ and symmetric  Skin- No rashes, lesions, ulcers    Laboratory:  CBC:   Recent Labs  Lab 01/21/18  0245   WBC 20.50*   RBC 3.20*   HGB 9.4*   HCT 28.0*      MCV 88   MCH 29.4   MCHC 33.6     CMP:   Recent Labs  Lab 01/21/18  0245   *  192*   CALCIUM 7.1*  7.1*   ALBUMIN 2.3*   PROT 4.4*   *  135*   K 4.1  4.1   CO2 21*  21*     102   BUN 39*  39*   CREATININE 5.1*  5.1*   ALKPHOS 78   ALT 10   AST 20   BILITOT 0.9     LFTs:   Recent Labs  Lab 01/21/18  0245   ALT 10   AST 20   ALKPHOS 78   BILITOT 0.9   PROT 4.4*   ALBUMIN 2.3*     Coagulation: No results for input(s): PT, INR, APTT in the last 168 hours.    Diagnostic Results:  X-Ray: Reviewed  CT results noted.  Patient has gallstones, atelactasis or consoliration, thickened colon.    ASSESSMENT: Coffee ground emesis (Stable)  Respiratory failure: improving. Patient extubated     Patient Active Problem List   Diagnosis    Atrial fibrillation    Metabolic acidosis    Lactic acid acidosis    Hyperkalemia    Hypothermia    Paroxysmal tachycardia    Acute renal failure    Muscle wasting    Neutrophilic leukocytosis    Uremia    Encephalopathy, metabolic     Hyperphosphatemia    Elevated troponin    Sepsis    Acute respiratory failure    On mechanically assisted ventilation         PLAN:     Patient has no evidence of GI bleeding at present. His abdominal examination is benign.  H & H is stable.  His WBC count is high.  No tenderness in RUQ area. Will follow with you. Continue with PPI drip.  If WBC remains high and no other explanation then we will do HIDA scan to evaluate GB function.

## 2018-01-21 NOTE — PROGRESS NOTES
ERNA Jesus is a 67 y.o. male patient.    Follow for SOURAV    No new c/o, comfortable  Reportedly feeling better    Scheduled Meds:   calcium gluconate IVPB  1,000 mg Intravenous Once    ceFEPime (MAXIPIME) IVPB  1 g Intravenous Q12H    ciprofloxacin  400 mg Intravenous Q24H    heparin (porcine)  5,000 Units Subcutaneous Q8H    levothyroxine  100 mcg Intravenous Daily    lidocaine HCL 10 mg/ml (1%)  5 mL Other Once    methylPREDNISolone sodium succinate  40 mg Intravenous Q12H    Banana bag   Intravenous Daily       Review of patient's allergies indicates:  No Known Allergies      Vital Signs Range (Last 24H):  Temp:  [98.2 °F (36.8 °C)-98.7 °F (37.1 °C)]   Pulse:  []   Resp:  [12-26]   BP: ()/(57-79)   SpO2:  [98 %-100 %]   Arterial Line BP: ()/(40-73)     I & O (Last 24H):  Intake/Output Summary (Last 24 hours) at 01/21/18 0845  Last data filed at 01/21/18 0700   Gross per 24 hour   Intake          2960.91 ml   Output             3220 ml   Net          -259.09 ml           Physical Exam:  General appearance: well developed, well nourished, no distress  Lungs:  clear to auscultation bilaterally and normal respiratory effort  Heart: regular rate and rhythm  Abdomen: soft, non-tender non-distented; bowel sounds normal; no masses,  no organomegaly  Extremities: no cyanosis or edema, or clubbing    Laboratory:  CBC:   Recent Labs  Lab 01/21/18  0245   WBC 20.50*   RBC 3.20*   HGB 9.4*   HCT 28.0*      MCV 88   MCH 29.4   MCHC 33.6     CMP:   Recent Labs  Lab 01/21/18  0245   *  192*   CALCIUM 7.1*  7.1*   ALBUMIN 2.3*   PROT 4.4*   *  135*   K 4.1  4.1   CO2 21*  21*     102   BUN 39*  39*   CREATININE 5.1*  5.1*   ALKPHOS 78   ALT 10   AST 20   BILITOT 0.9       Imp/Plan    SOURAV - prerenal/ATN, u/o good, ? Recovery  Acute respiratory failure s/p extubation yesterday, tolerated well  GI bleed   DM type 2  Hypotension - resolved  Anemia of acute  loss    Continue IVF  Watch renal function closely  CMP in am        Trac T Le  1/21/2018

## 2018-01-22 PROBLEM — J96.00 ACUTE RESPIRATORY FAILURE: Status: ACTIVE | Noted: 2018-01-22

## 2018-01-22 PROBLEM — A41.9 SEPSIS: Status: ACTIVE | Noted: 2018-01-22

## 2018-01-22 LAB
ANION GAP SERPL CALC-SCNC: 10 MMOL/L
BASOPHILS # BLD AUTO: 0 K/UL
BASOPHILS NFR BLD: 0 %
BUN SERPL-MCNC: 46 MG/DL
CALCIUM SERPL-MCNC: 7.2 MG/DL
CHLORIDE SERPL-SCNC: 107 MMOL/L
CO2 SERPL-SCNC: 20 MMOL/L
CREAT SERPL-MCNC: 5.4 MG/DL
DIFFERENTIAL METHOD: ABNORMAL
EOSINOPHIL # BLD AUTO: 0 K/UL
EOSINOPHIL NFR BLD: 0 %
ERYTHROCYTE [DISTWIDTH] IN BLOOD BY AUTOMATED COUNT: 13.8 %
EST. GFR  (AFRICAN AMERICAN): 12 ML/MIN/1.73 M^2
EST. GFR  (NON AFRICAN AMERICAN): 10 ML/MIN/1.73 M^2
GLUCOSE SERPL-MCNC: 214 MG/DL
HCT VFR BLD AUTO: 28.7 %
HGB BLD-MCNC: 9.4 G/DL
LYMPHOCYTES # BLD AUTO: 0.2 K/UL
LYMPHOCYTES NFR BLD: 1.1 %
MCH RBC QN AUTO: 29 PG
MCHC RBC AUTO-ENTMCNC: 32.8 G/DL
MCV RBC AUTO: 89 FL
MONOCYTES # BLD AUTO: 1 K/UL
MONOCYTES NFR BLD: 4.6 %
NEUTROPHILS # BLD AUTO: 19.6 K/UL
NEUTROPHILS NFR BLD: 94.1 %
PLATELET # BLD AUTO: 165 K/UL
PMV BLD AUTO: 11.2 FL
POCT GLUCOSE: 203 MG/DL (ref 70–110)
POCT GLUCOSE: 211 MG/DL (ref 70–110)
POCT GLUCOSE: 234 MG/DL (ref 70–110)
POCT GLUCOSE: 235 MG/DL (ref 70–110)
POTASSIUM SERPL-SCNC: 3.9 MMOL/L
RBC # BLD AUTO: 3.24 M/UL
SODIUM SERPL-SCNC: 137 MMOL/L
WBC # BLD AUTO: 20.8 K/UL

## 2018-01-22 PROCEDURE — 25000003 PHARM REV CODE 250: Performed by: FAMILY MEDICINE

## 2018-01-22 PROCEDURE — 97165 OT EVAL LOW COMPLEX 30 MIN: CPT

## 2018-01-22 PROCEDURE — G8996 SWALLOW CURRENT STATUS: HCPCS | Mod: CH

## 2018-01-22 PROCEDURE — 92610 EVALUATE SWALLOWING FUNCTION: CPT

## 2018-01-22 PROCEDURE — G8989 SELF CARE D/C STATUS: HCPCS | Mod: CL

## 2018-01-22 PROCEDURE — 85025 COMPLETE CBC W/AUTO DIFF WBC: CPT

## 2018-01-22 PROCEDURE — 25000003 PHARM REV CODE 250: Performed by: HOSPITALIST

## 2018-01-22 PROCEDURE — G8988 SELF CARE GOAL STATUS: HCPCS | Mod: CJ

## 2018-01-22 PROCEDURE — G8987 SELF CARE CURRENT STATUS: HCPCS | Mod: CL

## 2018-01-22 PROCEDURE — 63600175 PHARM REV CODE 636 W HCPCS: Performed by: FAMILY MEDICINE

## 2018-01-22 PROCEDURE — 99233 SBSQ HOSP IP/OBS HIGH 50: CPT | Mod: ,,, | Performed by: INTERNAL MEDICINE

## 2018-01-22 PROCEDURE — 63600175 PHARM REV CODE 636 W HCPCS: Performed by: EMERGENCY MEDICINE

## 2018-01-22 PROCEDURE — G8997 SWALLOW GOAL STATUS: HCPCS | Mod: CH

## 2018-01-22 PROCEDURE — 20000000 HC ICU ROOM

## 2018-01-22 PROCEDURE — 63600175 PHARM REV CODE 636 W HCPCS: Performed by: INTERNAL MEDICINE

## 2018-01-22 PROCEDURE — 36415 COLL VENOUS BLD VENIPUNCTURE: CPT

## 2018-01-22 PROCEDURE — C9113 INJ PANTOPRAZOLE SODIUM, VIA: HCPCS | Performed by: INTERNAL MEDICINE

## 2018-01-22 PROCEDURE — 80048 BASIC METABOLIC PNL TOTAL CA: CPT

## 2018-01-22 PROCEDURE — 97161 PT EVAL LOW COMPLEX 20 MIN: CPT

## 2018-01-22 PROCEDURE — G8998 SWALLOW D/C STATUS: HCPCS | Mod: CH

## 2018-01-22 PROCEDURE — 25000003 PHARM REV CODE 250: Performed by: INTERNAL MEDICINE

## 2018-01-22 RX ORDER — PANTOPRAZOLE SODIUM 40 MG/10ML
40 INJECTION, POWDER, LYOPHILIZED, FOR SOLUTION INTRAVENOUS 2 TIMES DAILY
Status: DISCONTINUED | OUTPATIENT
Start: 2018-01-22 | End: 2018-01-26 | Stop reason: HOSPADM

## 2018-01-22 RX ORDER — METOPROLOL TARTRATE 1 MG/ML
5 INJECTION, SOLUTION INTRAVENOUS ONCE
Status: COMPLETED | OUTPATIENT
Start: 2018-01-22 | End: 2018-01-22

## 2018-01-22 RX ADMIN — CEFEPIME 1 G: 1 INJECTION, POWDER, FOR SOLUTION INTRAMUSCULAR; INTRAVENOUS at 01:01

## 2018-01-22 RX ADMIN — METHYLPREDNISOLONE SODIUM SUCCINATE 40 MG: 40 INJECTION, POWDER, FOR SOLUTION INTRAMUSCULAR; INTRAVENOUS at 09:01

## 2018-01-22 RX ADMIN — INSULIN ASPART 4 UNITS: 100 INJECTION, SOLUTION INTRAVENOUS; SUBCUTANEOUS at 05:01

## 2018-01-22 RX ADMIN — HEPARIN SODIUM 5000 UNITS: 5000 INJECTION, SOLUTION INTRAVENOUS; SUBCUTANEOUS at 09:01

## 2018-01-22 RX ADMIN — DEXTROSE MONOHYDRATE 8 MG/HR: 5 INJECTION INTRAVENOUS at 02:01

## 2018-01-22 RX ADMIN — HEPARIN SODIUM 5000 UNITS: 5000 INJECTION, SOLUTION INTRAVENOUS; SUBCUTANEOUS at 05:01

## 2018-01-22 RX ADMIN — AMIODARONE HYDROCHLORIDE: 50 INJECTION, SOLUTION INTRAVENOUS at 09:01

## 2018-01-22 RX ADMIN — LEVOTHYROXINE SODIUM ANHYDROUS 100 MCG: 100 INJECTION, POWDER, LYOPHILIZED, FOR SOLUTION INTRAVENOUS at 08:01

## 2018-01-22 RX ADMIN — HEPARIN SODIUM 5000 UNITS: 5000 INJECTION, SOLUTION INTRAVENOUS; SUBCUTANEOUS at 02:01

## 2018-01-22 RX ADMIN — PANTOPRAZOLE SODIUM 40 MG: 40 INJECTION, POWDER, FOR SOLUTION INTRAVENOUS at 08:01

## 2018-01-22 RX ADMIN — CIPROFLOXACIN 400 MG: 2 INJECTION, SOLUTION INTRAVENOUS at 02:01

## 2018-01-22 RX ADMIN — INSULIN ASPART 4 UNITS: 100 INJECTION, SOLUTION INTRAVENOUS; SUBCUTANEOUS at 12:01

## 2018-01-22 RX ADMIN — METOPROLOL TARTRATE 5 MG: 5 INJECTION INTRAVENOUS at 01:01

## 2018-01-22 RX ADMIN — FOLIC ACID: 5 INJECTION, SOLUTION INTRAMUSCULAR; INTRAVENOUS; SUBCUTANEOUS at 09:01

## 2018-01-22 RX ADMIN — INSULIN ASPART 2 UNITS: 100 INJECTION, SOLUTION INTRAVENOUS; SUBCUTANEOUS at 12:01

## 2018-01-22 RX ADMIN — METHYLPREDNISOLONE SODIUM SUCCINATE 40 MG: 40 INJECTION, POWDER, FOR SOLUTION INTRAMUSCULAR; INTRAVENOUS at 08:01

## 2018-01-22 RX ADMIN — PANTOPRAZOLE SODIUM 40 MG: 40 INJECTION, POWDER, FOR SOLUTION INTRAVENOUS at 09:01

## 2018-01-22 NOTE — PLAN OF CARE
Problem: SLP Goal  Goal: SLP Goal  Pt will participate in bedside swallow study (GOAL MET 1/22/18)  Outcome: Outcome(s) achieved Date Met: 01/22/18 1/22/17 Bedside swallow completed; no overt s/s of aspiration, no significant s/s of  oropharyngeal dysphagia. Will defer to GI for further diet recs and upgrades. No further ST is warranted. Mila Verma, DURAN-SLP

## 2018-01-22 NOTE — ASSESSMENT & PLAN NOTE
Appears to be PAF, currently in sinus rhythm.  Echo with good EF. Decrease IV amiodarone and change to po in AM On SC heparin

## 2018-01-22 NOTE — PT/OT/SLP EVAL
"Speech Language Pathology Evaluation  Bedside Swallow    Patient Name:  ERNA Jesus   MRN:  [id number]  Admitting Diagnosis: Metabolic acidosis    Recommendations:                 General Recommendations:  Follow-up not indicated  Diet recommendations:  Regular (when cleared by GI), Thin ; per report GI recommending clears at this time.  Aspiration Precautions: HOB to 90 degrees   General Precautions: Standard,    Communication strategies:  none    History:     History reviewed. No pertinent past medical history.    History reviewed. No pertinent surgical history.    Social History: independent for ADLs    Prior Intubation HX:  Extubated 1/20, per report some coughing on ice shortly after extubated    Modified Barium Swallow: none on file    Chest X-Rays: 1/22 Mild increased density within the right lung base in a retrocardiac location.  This may be related to atelectatic changes although subtle left basilar pneumonia cannot be excluded.     Prior diet: unrestricted.      Subjective   Pt's family reporting they were told Pt had been coughing on ice chip trials provided by nursing, Pt denies this.   Patient goals: "I want to eat [employer]"    Objective:     Oral Musculature Evaluation  · Oral Musculature: WFL  · Dentition: scattered dentition  · Mandibular Strength and Mobility: WFL  · Oral Labial Strength and Mobility: WFL  · Lingual Strength and Mobility: WFL  · Voice Prior to PO Intake: wfl  · Oral Musculature Comments: grossly intact, partial upper dentures unavailable at this time.    Bedside Swallow Eval:   Consistencies Assessed:  · Thin liquids X5 trials  · Puree X2 trials  · Solids X1 trial     Oral Phase:   · WFL    Pharyngeal Phase:   · no overt clinical signs/symptoms of aspiration    Compensatory Strategies  · None    Treatment: no further ST is warranted.     Assessment:     ERNA Jesus is a 67 y.o. male with diagnosis of metabolic acidosis he presents with no significant s/s of oropharyngeal " dysphagia.     Goals:    SLP Goals     Not on file          Multidisciplinary Problems (Resolved)        Problem: SLP Goal    Goal Priority Disciplines Outcome   SLP Goal   (Resolved)    Low SLP Outcome(s) achieved   Description:  Pt will participate in bedside swallow study (GOAL MET 1/22/18)                    Plan:     · Patient to be seen:      · Plan of Care expires:  01/22/18  · Plan of Care reviewed with:      · SLP Follow-Up:  No       Discharge recommendations:  other (see comments) (no further ST is warranted will defer to OT/PT recs)     Time Tracking:     SLP Treatment Date:   01/22/18  Speech Start Time:  1135  Speech Stop Time:  1145     Speech Total Time (min):  10 min    Billable Minutes: Eval Swallow and Oral Function 10    Mila Verma CCC-SLP  01/22/2018

## 2018-01-22 NOTE — SUBJECTIVE & OBJECTIVE
Interval History:     Review of Systems   Unable to perform ROS: acuity of condition     Objective:     Vital Signs (Most Recent):  Temp: 98.6 °F (37 °C) (01/22/18 0400)  Pulse: 68 (01/22/18 0600)  Resp: (!) 21 (01/22/18 0600)  BP: 132/66 (01/22/18 0600)  SpO2: 96 % (01/22/18 0600) Vital Signs (24h Range):  Temp:  [98.4 °F (36.9 °C)-99.9 °F (37.7 °C)] 98.6 °F (37 °C)  Pulse:  [] 68  Resp:  [18-29] 21  SpO2:  [96 %-99 %] 96 %  BP: (119-152)/(63-73) 132/66     Weight: 69 kg (152 lb 1.9 oz)  Body mass index is 22.46 kg/m².     SpO2: 96 %  O2 Device (Oxygen Therapy): nasal cannula      Intake/Output Summary (Last 24 hours) at 01/22/18 0805  Last data filed at 01/22/18 0600   Gross per 24 hour   Intake          4335.87 ml   Output             2225 ml   Net          2110.87 ml       Lines/Drains/Airways     Central Venous Catheter Line                 Hemodialysis Catheter 01/18/18 1707 right femoral 3 days         Percutaneous Central Line Insertion/Assessment - triple lumen  01/18/18 1500 right internal jugular 3 days          Drain                 Urethral Catheter 01/18/18 1200 3 days                Physical Exam   Constitutional: He is oriented to person, place, and time. He appears well-developed and well-nourished.   HENT:   Head: Normocephalic and atraumatic.   Eyes: Conjunctivae are normal. Pupils are equal, round, and reactive to light.   Neck: Normal range of motion. Neck supple.   Cardiovascular: Normal rate, normal heart sounds and intact distal pulses.    Pulmonary/Chest: Effort normal and breath sounds normal.   Abdominal: Soft. Bowel sounds are normal.   Musculoskeletal: Normal range of motion.   Neurological: He is alert and oriented to person, place, and time.   Skin: Skin is warm and dry.       Significant Labs: All pertinent lab results from the last 24 hours have been reviewed.    Significant Imaging: Echocardiogram:   2D echo with color flow doppler:   Results for orders placed or performed  during the hospital encounter of 01/18/18   2D echo with color flow doppler   Result Value Ref Range    EF 60 55 - 65    Mitral Valve Regurgitation TRIVIAL     Diastolic Dysfunction No     Est. PA Systolic Pressure 31.09     Tricuspid Valve Regurgitation TRIVIAL TO MILD

## 2018-01-22 NOTE — PLAN OF CARE
Problem: Occupational Therapy Goal  Goal: Occupational Therapy Goal  Goals to be met by: 1/29/2018     Patient will increase functional independence with ADLs by performing:    Feeding with [place].  UE Dressing with Contact Guard Assistance.  LE Dressing with Minimal Assistance.  Grooming while seated with Supervision.  Sitting at edge of bed x15 minutes with Stand-by Assistance.  Supine to sit with Contact Guard Assistance.  Stand pivot transfers with Contact Guard Assistance.  Upper extremity exercise program with assistance as needed.    Outcome: Ongoing (interventions implemented as appropriate)  Patient tolerated evaluation well, good participation.  Patient will benefit from skilled OT services to address the above mentioned goals. DERRICK Mike, Dav[place]

## 2018-01-22 NOTE — PLAN OF CARE
Problem: Patient Care Overview  Goal: Plan of Care Review  Outcome: Ongoing (interventions implemented as appropriate)  Remains on amio drip in sinus rhythm. Some PVC and PACs. BP WNL. UOP WNL. Up with PT/OT. Cleared by speech for diet. No falls, injuries or new skin breakdown

## 2018-01-22 NOTE — PT/OT/SLP EVAL
"Occupational Therapy   Evaluation    Name: ERNA Jesus  MRN: [id number]  Admitting Diagnosis:  Metabolic acidosis      Recommendations:     Discharge Recommendations: nursing facility, skilled  Discharge Equipment Recommendations:   (TBD)  Barriers to discharge:  None    History:     Occupational Profile:  Living Environment: lives at [facility]  Previous level of function: ambulated short distances with a RW in his room, able to toilet independently, able to self-propel his wheelchair  Equipment Owned:  walker, rolling, wheelchair  Assistance upon Discharge: nursing home resident    History reviewed. No pertinent past medical history.    History reviewed. No pertinent surgical history.    Subjective     Chief Complaint: "I want to stand up."  Patient/Family stated goals: to get better  Communicated with: nurse Brandi prior to session.  Pain/Comfort:  · Pain Rating 1: 0/10    Objective:     Patient found with: bed alarm, blood pressure cuff, potts catheter, oxygen, peripheral IV, pulse ox (continuous), SCD, telemetry, central line    General Precautions: Standard, fall   Orthopedic Precautions:N/A   Braces: N/A     Occupational Performance:    Bed Mobility:    · Patient completed Rolling/Turning to Left with  dependent  · Patient completed Rolling/Turning to Right with dependent  · Patient completed Scooting/Bridging with dependent  · Patient completed Supine to Sit with dependent  · Patient completed Sit to Supine with dependent    Functional Mobility/Transfers:  · Patient completed Sit <> Stand Transfer with maximal assistance  with  rolling walker and X2 people     Activities of Daily Living:  · UB Dressing: maximal assistance    · LB Dressing: total assistance seated EOB    Cognitive/Visual Perceptual:  Cognitive/Psychosocial Skills:     -       Oriented to: Person and Situation   -       Follows Commands/attention:Easily distracted and Follows one-step commands  -       Communication: dysarthria  -       " "Memory: Poor immediate recall  -       Safety awareness/insight to disability: impaired     Physical Exam:  Balance:    -       poor sit balance EOB (moderate assistance)    Patient left supine with all lines intact, call button in reach, nurse notified and family present    [facility] 6 Click:  [facility] Total Score: 13    Treatment & Education:  Evaluation  Education:    Assessment:     ERNA Jesus is a 67 y.o. male with a medical diagnosis of Metabolic acidosis.  He presents with  independence for self-care and functional transfers.  Performance deficits affecting function are weakness, impaired endurance, impaired self care skills, impaired functional mobilty, gait instability, impaired balance, decreased upper extremity function, decreased safety awareness, decreased ROM, impaired coordination, impaired cardiopulmonary response to activity.      Rehab Prognosis:  Good; patient would benefit from acute skilled OT services to address these deficits and reach maximum level of function.         Clinical Decision Makin.  OT Low:  "Pt evaluation falls under low complexity for evaluation coding due to performance deficits noted in 1-3 areas as stated above and 0 co-morbities affecting current functional status. Data obtained from problem focused assessments. No modifications or assistance was required for completion of evaluation. Only brief occupational profile and history review completed."     Plan:     Patient to be seen 3 x/week to address the above listed problems via self-care/home management, therapeutic activities, therapeutic exercises  · Plan of Care Expires: 18  · Plan of Care Reviewed with: patient    This Plan of care has been discussed with the patient who was involved in its development and understands and is in agreement with the identified goals and treatment plan    GOALS:    Occupational Therapy Goals        Problem: Occupational Therapy Goal    Goal Priority Disciplines Outcome " Interventions   Occupational Therapy Goal     OT, PT/OT Ongoing (interventions implemented as appropriate)    Description:  Goals to be met by: 1/29/2018     Patient will increase functional independence with ADLs by performing:    Feeding with Citrus.  UE Dressing with Contact Guard Assistance.  LE Dressing with Minimal Assistance.  Grooming while seated with Supervision.  Sitting at edge of bed x15 minutes with Stand-by Assistance.  Supine to sit with Contact Guard Assistance.  Stand pivot transfers with Contact Guard Assistance.  Upper extremity exercise program with assistance as needed.                      Time Tracking:     OT Date of Treatment: 01/22/18  OT Start Time: 1145  OT Stop Time: 1159  OT Total Time (min): 14 min    Billable Minutes:Evaluation 14 minutes co-eval with PT    Alysha March OT  1/22/2018

## 2018-01-22 NOTE — PROGRESS NOTES
[facility]  Cardiology  Progress Note    Patient Name: ERNA Jesus  MRN: [id number]  Admission Date: 1/18/2018  Hospital Length of Stay: 4 days  Code Status: Full Code   Attending Physician: Giancarlo Cm MD   Primary Care Physician: No primary care provider on file.  Expected Discharge Date:   Principal Problem:Metabolic acidosis    Subjective:     Hospital Course:   1/22 Bradycardic yesterday. Rates improved now. Still having episodes of A-fib RVR  Denies CP or SOB    Echo 1/19/18    1 - Normal left ventricular systolic function (EF 60-65%).     2 - Concentric hypertrophy.     3 - Trivial mitral regurgitation.     4 - Trivial to mild tricuspid regurgitation.     Interval History:     Review of Systems   Unable to perform ROS: acuity of condition     Objective:     Vital Signs (Most Recent):  Temp: 98.6 °F (37 °C) (01/22/18 0400)  Pulse: 68 (01/22/18 0600)  Resp: (!) 21 (01/22/18 0600)  BP: 132/66 (01/22/18 0600)  SpO2: 96 % (01/22/18 0600) Vital Signs (24h Range):  Temp:  [98.4 °F (36.9 °C)-99.9 °F (37.7 °C)] 98.6 °F (37 °C)  Pulse:  [] 68  Resp:  [18-29] 21  SpO2:  [96 %-99 %] 96 %  BP: (119-152)/(63-73) 132/66     Weight: 69 kg (152 lb 1.9 oz)  Body mass index is 22.46 kg/m².     SpO2: 96 %  O2 Device (Oxygen Therapy): nasal cannula      Intake/Output Summary (Last 24 hours) at 01/22/18 0805  Last data filed at 01/22/18 0600   Gross per 24 hour   Intake          4335.87 ml   Output             2225 ml   Net          2110.87 ml       Lines/Drains/Airways     Central Venous Catheter Line                 Hemodialysis Catheter 01/18/18 1707 right femoral 3 days         Percutaneous Central Line Insertion/Assessment - triple lumen  01/18/18 1500 right internal jugular 3 days          Drain                 Urethral Catheter 01/18/18 1200 3 days                Physical Exam   Constitutional: He is oriented to person, place, and time. He appears well-developed and well-nourished.   HENT:    Head: Normocephalic and atraumatic.   Eyes: Conjunctivae are normal. Pupils are equal, round, and reactive to light.   Neck: Normal range of motion. Neck supple.   Cardiovascular: Normal rate, normal heart sounds and intact distal pulses.    Pulmonary/Chest: Effort normal and breath sounds normal.   Abdominal: Soft. Bowel sounds are normal.   Musculoskeletal: Normal range of motion.   Neurological: He is alert and oriented to person, place, and time.   Skin: Skin is warm and dry.       Significant Labs: All pertinent lab results from the last 24 hours have been reviewed.    Significant Imaging: Echocardiogram:   2D echo with color flow doppler:   Results for orders placed or performed during the hospital encounter of 01/18/18   2D echo with color flow doppler   Result Value Ref Range    EF 60 55 - 65    Mitral Valve Regurgitation TRIVIAL     Diastolic Dysfunction No     Est. PA Systolic Pressure 31.09     Tricuspid Valve Regurgitation TRIVIAL TO MILD      Assessment and Plan:     Brief HPI:     * Metabolic acidosis             Acute respiratory failure             Elevated troponin    Suspect related to demand ischemia and ARF. Ischemic evaluation when he stabilizes        Encephalopathy, metabolic             Acute renal failure             Paroxysmal tachycardia    Appears to be PAF, currently in sinus rhythm.  Echo with good EF. Decrease IV amiodarone and change to po in AM On SC heparin        Lactic acid acidosis                 VTE Risk Mitigation         Ordered     heparin (porcine) injection 5,000 Units  Every 8 hours     Route:  Subcutaneous        01/18/18 1551     Place sequential compression device  Until discontinued      01/18/18 2016     heparin (porcine) injection 5,000 Units  As needed (PRN)     Route:  Intra-Catheter        01/18/18 1757     Medium Risk of VTE  Once      01/18/18 1551          Arley Rajput MD  Cardiology  [facility]

## 2018-01-22 NOTE — PT/OT/SLP EVAL
Physical Therapy Evaluation    Patient Name:  ERNA Jesus   MRN:  [id number]    Recommendations:     Discharge Recommendations:  nursing facility, skilled   Discharge Equipment Recommendations: other (see comments) (TBD once back at NH )   Barriers to discharge: None    Assessment:     ERNA Jesus is a 67 y.o. male admitted with a medical diagnosis of Metabolic acidosis.  He presents with the following impairments/functional limitations:  weakness, impaired endurance, impaired functional mobilty, gait instability, impaired balance, decreased lower extremity function, decreased safety awareness, impaired cognition, decreased ROM.    Rehab Prognosis:  fair; patient would benefit from acute skilled PT services to address these deficits and reach maximum level of function.      Intubated 1/18/18  Extubated 1/20/18    Recent Surgery: * No surgery found *      Plan:     During this hospitalization, patient to be seen 2 x/week to address the above listed problems via gait training, therapeutic activities, therapeutic exercises, wheelchair management/training  · Plan of Care Expires:  02/05/18   Plan of Care Reviewed with: patient    Subjective     Communicated with nurse Paz prior to session.  Patient found supine in bed upon PT entry to room, agreeable to evaluation.      Chief Complaint: Wants [employer].   Patient comments/goals: To walk.   Pain/Comfort:  Pain Rating 1: 0/10    Living Environment:  Patient lives at [facility]. Per NH staff he used a wheelchair to get around. Per son, he was able to walk a short distance to the bathroom with RW. Prior to admission, patients level of function was he required assistance for all mobility. Patient has the following equipment: walker, rolling, wheelchair. Upon discharge, patient will have assistance from nursing home staff.    Objective:     Patient found with: blood pressure cuff, telemetry, peripheral IV, central line, potts catheter     General Precautions:  Standard, fall   Orthopedic Precautions:N/A   Braces: N/A     Exams:  · Cognitive Exam:  Patient is oriented to Person and follows 50% of simple commands with delayed responses  · Gross Motor Coordination:  Impaired due to weakness  · Postural Exam:  Patient presented with the following abnormalities:    · -       Rounded shoulders  · -       Forward head  · -       Abnormal trunk flexion  · Sensation:    · -       Intact  · RLE ROM: limited at all joints due to stiffness. Also, he appears to have foot drop.   · RLE Strength: 3+/5  · LLE ROM: limited at all joints due to stiffness  · LLE Strength: 3+/5    Functional Mobility:  · Bed Mobility:     · Supine to Sit: dependence of 2 persons  · Sit to Supine: dependence of 2 persons  · Transfers:     · Sit to Stand:  Maximal assistance of 2 persons with rolling walker    AM-PAC 6 CLICK MOBILITY  Total Score:10     Patient left supine with all lines intact, call button in reach, nurse notified and family  present.    GOALS:    Physical Therapy Goals        Problem: Physical Therapy Goal    Goal Priority Disciplines Outcome Goal Variances Interventions   Physical Therapy Goal     PT/OT, PT      Description:  Goals to be met by: 18    Patient will increase functional independence with mobility by performin. Supine to sit with Minimal Assistance  2. Sit to stand transfer with Minimal Assistance  3. Bed to chair transfer with Minimal Assistance using Rolling Walker  4. Gait  x 25 feet with Minimal Assistance using Rolling Walker  5. Lower extremity exercise program x30 reps per handout, with supervision                      History:     History reviewed. No pertinent past medical history.    History reviewed. No pertinent surgical history.    Time Tracking:     PT Received On: 18  PT Start Time: 1140     PT Stop Time: 1200  PT Total Time (min): 20 min     Billable Minutes: Evaluation  20 with OT present    Valentina Bradford, PT  2018

## 2018-01-22 NOTE — PLAN OF CARE
Problem: Patient Care Overview  Goal: Plan of Care Review  Outcome: Ongoing (interventions implemented as appropriate)  Remains in ICU. EKG done. Patient in Afib RVR. Amio rebolused and drip restarted. Remains on protonix drip due to swallowing concerns. GI ok with clear liquids and PO protonix if patient able to pass swallow study tomorrow. Patient only oriented to self and place. Slow to respond to questions. Family expresses concern over mental status. No falls, injuries, or new skin breakdown. BP WNL. Afebrile. UOP WNL.

## 2018-01-22 NOTE — PROGRESS NOTES
Progress Note    Admit Date: 1/18/2018   LOS: 4 days     SUBJECTIVE:     Follow-up For:  Acute Renal failure with severe metabolic acidosis    Scheduled Meds:   ceFEPime (MAXIPIME) IVPB  1 g Intravenous Q12H    ciprofloxacin  400 mg Intravenous Q24H    heparin (porcine)  5,000 Units Subcutaneous Q8H    levothyroxine  100 mcg Intravenous Daily    methylPREDNISolone sodium succinate  40 mg Intravenous Q12H    Banana bag   Intravenous Daily     Continuous Infusions:   sodium chloride 0.9% 100 mL/hr at 01/22/18 0500    custom IV infusion builder (for pharmacist use only) 33 mL/hr at 01/22/18 0500    norepinephrine bitartrate-D5W Stopped (01/20/18 1000)    pantoprazole 40 mg in dextrose 5 % 100 mL infusion (ready to mix system) 8 mg/hr (01/22/18 0500)    propofol Stopped (01/20/18 1100)     PRN Meds:sodium chloride, acetaminophen, [COMPLETED] calcium gluconate IVPB **AND** calcium gluconate IVPB, dextrose 50%, glucagon (human recombinant), heparin (porcine), insulin aspart, ondansetron, propofol, vancomycin (VANCOCIN) IVPB    Review of patient's allergies indicates:  No Known Allergies    Review of Systems  Constitutional: positive for fatigue  Eyes: negative  Ears, nose, mouth, throat, and face: negative  Respiratory: positive for cough  Cardiovascular: positive for palpitations and three - four beat runs of V-tach  Gastrointestinal: negative for abdominal pain, melena, nausea and vomiting  Genitourinary:negative, + bloody stool  Musculoskeletal:positive for myalgias  Behavioral/Psych: negative  Endocrine: negative    OBJECTIVE:     Vital Signs (Most Recent)  Temp: 98.6 °F (37 °C) (01/22/18 0400)  Pulse: 69 (01/22/18 0500)  Resp: (!) 29 (01/22/18 0500)  BP: 130/65 (01/22/18 0500)  SpO2: 99 % (01/22/18 0500)    Vital Signs Range (Last 24H):  Temp:  [98.4 °F (36.9 °C)-99.9 °F (37.7 °C)]   Pulse:  []   Resp:  [18-29]   BP: (119-152)/(63-73)   SpO2:  [97 %-99 %]     I & O (Last 24H):    Intake/Output  Summary (Last 24 hours) at 01/22/18 0608  Last data filed at 01/22/18 0500   Gross per 24 hour   Intake          4422.87 ml   Output             1375 ml   Net          3047.87 ml     Physical Exam:  General: no distress  HENT: Head:normocephalic, atraumatic. Ears:bilateral TM's and external ear canals normal. Nose: Nares normal. Septum midline. Mucosa normal. No drainage or sinus tenderness., no discharge. Throat: lips, mucosa, and tongue normal; teeth and gums normal and no throat erythema.  Eyes: conjunctivae/corneas clear. PERRL.   Neck: supple, symmetrical, trachea midline, no JVD and thyroid not enlarged, symmetric, no tenderness/mass/nodules  Lungs:  clear to auscultation bilaterally, normal respiratory effort and rhonchi bilaterally  Cardiovascular: Heart: irregularly irregular rhythm and tachy eryn episoded=s. Chest Wall: no tenderness. Extremities: no cyanosis or edema, or clubbing. Pulses: 2+ and symmetric.  Abdomen/Rectal: Abdomen: soft, non-tender non-distented; bowel sounds normal; no masses,  no organomegaly. Rectal: not examined  Skin: Skin color, texture, turgor normal. No rashes or lesions  Musculoskeletal:no clubbing, cyanosis  Neurologic: Mental status: Alert, oriented, thought content appropriate    Laboratory:  CBC:     Recent Labs  Lab 01/22/18 0415   WBC 20.80*   RBC 3.24*   HGB 9.4*   HCT 28.7*      MCV 89   MCH 29.0   MCHC 32.8     BMP:   Recent Labs  Lab 01/20/18  2153  01/22/18 0415   GLU  --   < > 214*   NA  --   < > 137   K  --   < > 3.9   CL  --   < > 107   CO2  --   < > 20*   BUN  --   < > 46*   CREATININE  --   < > 5.4*   CALCIUM  --   < > 7.2*   MG 2.0  --   --    < > = values in this interval not displayed.  CMP:     Recent Labs  Lab 01/21/18  0245 01/22/18 0415   *  192* 214*   CALCIUM 7.1*  7.1* 7.2*   ALBUMIN 2.3*  --    PROT 4.4*  --    *  135* 137   K 4.1  4.1 3.9   CO2 21*  21* 20*     102 107   BUN 39*  39* 46*   CREATININE 5.1*  5.1*  5.4*   ALKPHOS 78  --    ALT 10  --    AST 20  --    BILITOT 0.9  --      LFTs:     Recent Labs  Lab 01/21/18  0245   ALT 10   AST 20   ALKPHOS 78   BILITOT 0.9   PROT 4.4*   ALBUMIN 2.3*     Coagulation: No results for input(s): LABPROT, INR, APTT in the last 168 hours.  Cardiac markers:   Recent Labs  Lab 01/19/18  0307  01/21/18  1520   CPKMB 19.8*  --   --    TROPONINI 1.557*  < > 0.362*   < > = values in this interval not displayed.  ABGs:     Recent Labs  Lab 01/20/18  1202   PH 7.462*   PCO2 31.9*   PO2 112*   HCO3 22.8*   POCSATURATED 99   BE -1     Microbiology Results (last 7 days)     Procedure Component Value Units Date/Time    Blood culture [[id number]] Collected:  01/18/18 1315    Order Status:  Completed Specimen:  Blood from Peripheral, Antecubital, Left Updated:  01/21/18 1503     Blood Culture, Routine No Growth to date     Blood Culture, Routine No Growth to date     Blood Culture, Routine No Growth to date     Blood Culture, Routine No Growth to date    Blood culture [[id number]] Collected:  01/18/18 1330    Order Status:  Completed Specimen:  Blood from Peripheral, Antecubital, Left Updated:  01/21/18 1503     Blood Culture, Routine No Growth to date     Blood Culture, Routine No Growth to date     Blood Culture, Routine No Growth to date     Blood Culture, Routine No Growth to date    Urine culture [[id number]] Collected:  01/18/18 1429    Order Status:  Completed Specimen:  Urine from Urine, Catheterized Updated:  01/20/18 0840     Urine Culture, Routine No growth        Specimen (12h ago through future)    None          Recent Labs  Lab 01/18/18  1429   COLORU Yellow   SPECGRAV 1.010   PHUR 5.0   PROTEINUA 1+*   BACTERIA Few*   NITRITE Negative   LEUKOCYTESUR Negative   UROBILINOGEN Negative   HYALINECASTS 1         Diagnostic Results:  Labs: Reviewed  ECG: Reviewed  X-Ray: Reviewed  CT: Reviewed  reviewed    ASSESSMENT/PLAN:     .1. Shock -resolved possible Septic Shock vs profound dehydration  with ARF  Blood cultures negative so far  2. Acute renal failure most likely s/t infection or Dehydration -improving slowly  3. Severe Metabolic acidosis - s/t renal failure -improving off HD  4. New on set A-Fib - restart Amiodarone monitor rate -give metoprolol 5 IV for rate control.  5. Troponin elevated - will trend out and consult cards maybe s/t renal impairment  6. Essential HTN - stable  7. Hyperkalemia - s/t acute renal failure - resolved  8. Normocytic Anemia - will investigate -s/p transfusion - stable  9. Hypothermia - s/t Shock - resolved  10. H/O T2DM - continue s/s insulin steroid being used  11. Leukocytosis -blood cultures negative so far   12. Hypothyroidism by history. -monitor  13. Throat discomfort with swallowing which is mostly s/t trauma with intubation  Will get ST eval  14. Right great toe redness with small wound and redness - will xray and investigate further if needed    Plan: continue plan as above will order swallow study.

## 2018-01-22 NOTE — PROGRESS NOTES
"                               Renal ICU Progress Note    Date of Admission:  1/18/2018 11:28 AM      No new complaints, remains NPO      Scheduled Meds:   ceFEPime (MAXIPIME) IVPB  1 g Intravenous Q12H    ciprofloxacin  400 mg Intravenous Q24H    heparin (porcine)  5,000 Units Subcutaneous Q8H    levothyroxine  100 mcg Intravenous Daily    methylPREDNISolone sodium succinate  40 mg Intravenous Q12H    pantoprazole  40 mg Intravenous BID    Banana bag   Intravenous Daily     Vital Signs Range (Last 24H):  Temp:  [98.2 °F (36.8 °C)-99.9 °F (37.7 °C)]   Pulse:  []   Resp:  [17-29]   BP: (119-152)/(63-72)   SpO2:  [95 %-99 %]     I & O (Last 24H):    Intake/Output Summary (Last 24 hours) at 01/22/18 1032  Last data filed at 01/22/18 0900   Gross per 24 hour   Intake          4506.72 ml   Output             2225 ml   Net          2281.72 ml           Physical Exam:  General appearance: well developed, well nourished, no distress  Lungs:  clear to auscultation bilaterally and normal respiratory effort  Heart: regular rate and rhythm  Abdomen: soft, non-tender non-distented; bowel sounds normal; no masses,  no organomegaly  Extremities: no cyanosis or edema, or clubbing    Laboratory:  CBC:     Recent Labs  Lab 01/22/18  0415   WBC 20.80*   RBC 3.24*   HGB 9.4*   HCT 28.7*      MCV 89   MCH 29.0   MCHC 32.8     CMP:     Recent Labs  Lab 01/21/18  0245 01/22/18  0415   *  192* 214*   CALCIUM 7.1*  7.1* 7.2*   ALBUMIN 2.3*  --    PROT 4.4*  --    *  135* 137   K 4.1  4.1 3.9   CO2 21*  21* 20*     102 107   BUN 39*  39* 46*   CREATININE 5.1*  5.1* 5.4*   ALKPHOS 78  --    ALT 10  --    AST 20  --    BILITOT 0.9  --        Assessment:    Non oliguric SOURAV requiring Hemodialysis  (prerenal v.s. ATN)   S/p Acute respiratory failure extubated yesterday  A-Fib w/ RVR  S/p GI bleed remains NPO  Hx. DM type 2  Hypotension - resolved -  Suspected "sepsis" w/ SIRS criteria, so far all " "cultures negative  Anemia of acute loss  Resolving Metabolic (lactic) acidosis  Hypoalbuminemia    Plan:    - Decrease IVF rate to 100cc/h  - Collins to gravity  - HOLD dialysis and see where Kidney function is heading to  - Stop Vancomycin  - Continue Cefepime but have Pharmacy adjust dose to current GFR  - Start diet when o.k. GI  - Amiodarone ggt as per Cardiology  - Try to find records at [facility] regarding prior Kidney function studies    *Addendum:  Labs from NH done 12/18/17 revealed a creatinine of 2.61 (estimated GFR of 27cc/min) pte. Likely CKD-4 as baseline and this finding will have a negative impact in his "recovery"  I discussed above with Pte.'s son and sister          Arvind Proctor  1/22/2018  "

## 2018-01-22 NOTE — PLAN OF CARE
Problem: Physical Therapy Goal  Goal: Physical Therapy Goal  Goals to be met by: 18    Patient will increase functional independence with mobility by performin. Supine to sit with Minimal Assistance  2. Sit to stand transfer with Minimal Assistance  3. Bed to chair transfer with Minimal Assistance using Rolling Walker  4. Gait  x 25 feet with Minimal Assistance using Rolling Walker  5. Lower extremity exercise program x30 reps per handout, with supervision      Patient would continue to benefit from PT while in the hospital.

## 2018-01-22 NOTE — PROGRESS NOTES
"Gastroenterology    CC: coffee ground emesis with anemia, abnl CT    HPI 67 y.o. male with no abd pain reported. Off ventilator.  No bleeding.  No N/V.      PMHX - HTN    Review of Systems  General ROS: negative for chills, fever   Cardiovascular ROS: no chest pain or dyspnea     Physical Examination  /66   Pulse 68   Temp 98.6 °F (37 °C) (Oral)   Resp (!) 21   Ht 5' 9" (1.753 m)   Wt 69 kg (152 lb 1.9 oz)   SpO2 96%   BMI 22.46 kg/m²   General appearance: alert, cooperative, no distress  HENT: Normocephalic, atraumatic, neck symmetrical, no nasal discharge   Eyes: conjunctivae/corneas clear, no icterus, EOM's intact  Lungs: resp non-labored  Heart: regular rate   Abdomen: soft, non-tender; ND  Extremities: extremities symmetric; no clubbing, cyanosis  Neurologic: Alert and oriented     Labs:  H/H stable    Imaging:  Ct reviewed    Assessment:     Dark emesis - H/H stable.  No bleeding noted recently.    Abnl CT - ? Reactive ischemic colitis vs primary. Abd soft.  Improving.    Plan:   - Cont 10-14 day course abx  - PPI BID  - Monitor H/H  - Advance diet as tolerated  - Will follow briefly      "

## 2018-01-22 NOTE — PLAN OF CARE
Problem: Patient Care Overview  Goal: Plan of Care Review  Outcome: Ongoing (interventions implemented as appropriate)  Plan of care reviewed. No falls no skin breakdown. Remains in ICU on amio gtt. Episode of Afib RVR overnight, MD notified Lopressor given and amio gtt increased from 0.5 to 1. Protonix and NS infusing. Abx given as scheduled. Adequate UOP. Labs monitored. VSS Afebrile. Will continue to monitor.

## 2018-01-23 LAB
ANION GAP SERPL CALC-SCNC: 12 MMOL/L
BACTERIA BLD CULT: NORMAL
BACTERIA BLD CULT: NORMAL
BASOPHILS # BLD AUTO: 0.01 K/UL
BASOPHILS NFR BLD: 0 %
BUN SERPL-MCNC: 52 MG/DL
CALCIUM SERPL-MCNC: 7.7 MG/DL
CHLORIDE SERPL-SCNC: 108 MMOL/L
CO2 SERPL-SCNC: 19 MMOL/L
CREAT SERPL-MCNC: 5.4 MG/DL
DIFFERENTIAL METHOD: ABNORMAL
EOSINOPHIL # BLD AUTO: 0 K/UL
EOSINOPHIL NFR BLD: 0 %
ERYTHROCYTE [DISTWIDTH] IN BLOOD BY AUTOMATED COUNT: 13.7 %
EST. GFR  (AFRICAN AMERICAN): 12 ML/MIN/1.73 M^2
EST. GFR  (NON AFRICAN AMERICAN): 10 ML/MIN/1.73 M^2
GLUCOSE SERPL-MCNC: 188 MG/DL
HCT VFR BLD AUTO: 27.1 %
HGB BLD-MCNC: 9.2 G/DL
LYMPHOCYTES # BLD AUTO: 0.2 K/UL
LYMPHOCYTES NFR BLD: 1 %
MCH RBC QN AUTO: 29.5 PG
MCHC RBC AUTO-ENTMCNC: 33.9 G/DL
MCV RBC AUTO: 87 FL
MONOCYTES # BLD AUTO: 0.8 K/UL
MONOCYTES NFR BLD: 3.8 %
NEUTROPHILS # BLD AUTO: 20.4 K/UL
NEUTROPHILS NFR BLD: 95.2 %
PLATELET # BLD AUTO: 156 K/UL
PMV BLD AUTO: 11.2 FL
POCT GLUCOSE: 177 MG/DL (ref 70–110)
POCT GLUCOSE: 182 MG/DL (ref 70–110)
POCT GLUCOSE: 184 MG/DL (ref 70–110)
POCT GLUCOSE: 211 MG/DL (ref 70–110)
POCT GLUCOSE: 216 MG/DL (ref 70–110)
POTASSIUM SERPL-SCNC: 3.8 MMOL/L
RBC # BLD AUTO: 3.12 M/UL
SODIUM SERPL-SCNC: 139 MMOL/L
WBC # BLD AUTO: 21.51 K/UL

## 2018-01-23 PROCEDURE — 90935 HEMODIALYSIS ONE EVALUATION: CPT

## 2018-01-23 PROCEDURE — 94761 N-INVAS EAR/PLS OXIMETRY MLT: CPT

## 2018-01-23 PROCEDURE — 63600175 PHARM REV CODE 636 W HCPCS: Performed by: FAMILY MEDICINE

## 2018-01-23 PROCEDURE — 85025 COMPLETE CBC W/AUTO DIFF WBC: CPT

## 2018-01-23 PROCEDURE — 63600175 PHARM REV CODE 636 W HCPCS: Performed by: EMERGENCY MEDICINE

## 2018-01-23 PROCEDURE — 63600175 PHARM REV CODE 636 W HCPCS: Performed by: INTERNAL MEDICINE

## 2018-01-23 PROCEDURE — 25000003 PHARM REV CODE 250: Performed by: INTERNAL MEDICINE

## 2018-01-23 PROCEDURE — 25000003 PHARM REV CODE 250: Performed by: HOSPITALIST

## 2018-01-23 PROCEDURE — C9113 INJ PANTOPRAZOLE SODIUM, VIA: HCPCS | Performed by: INTERNAL MEDICINE

## 2018-01-23 PROCEDURE — 20000000 HC ICU ROOM

## 2018-01-23 PROCEDURE — 99232 SBSQ HOSP IP/OBS MODERATE 35: CPT | Mod: ,,, | Performed by: INTERNAL MEDICINE

## 2018-01-23 PROCEDURE — 80048 BASIC METABOLIC PNL TOTAL CA: CPT

## 2018-01-23 PROCEDURE — 25000003 PHARM REV CODE 250: Performed by: FAMILY MEDICINE

## 2018-01-23 RX ORDER — SODIUM CHLORIDE 9 MG/ML
INJECTION, SOLUTION INTRAVENOUS
Status: DISCONTINUED | OUTPATIENT
Start: 2018-01-23 | End: 2018-01-26 | Stop reason: HOSPADM

## 2018-01-23 RX ORDER — METOPROLOL TARTRATE 1 MG/ML
5 INJECTION, SOLUTION INTRAVENOUS ONCE
Status: COMPLETED | OUTPATIENT
Start: 2018-01-23 | End: 2018-01-23

## 2018-01-23 RX ADMIN — HEPARIN SODIUM 5000 UNITS: 5000 INJECTION, SOLUTION INTRAVENOUS; SUBCUTANEOUS at 02:01

## 2018-01-23 RX ADMIN — LEVOTHYROXINE SODIUM ANHYDROUS 100 MCG: 100 INJECTION, POWDER, LYOPHILIZED, FOR SOLUTION INTRAVENOUS at 10:01

## 2018-01-23 RX ADMIN — METHYLPREDNISOLONE SODIUM SUCCINATE 40 MG: 40 INJECTION, POWDER, FOR SOLUTION INTRAMUSCULAR; INTRAVENOUS at 10:01

## 2018-01-23 RX ADMIN — HEPARIN SODIUM 5000 UNITS: 5000 INJECTION, SOLUTION INTRAVENOUS; SUBCUTANEOUS at 06:01

## 2018-01-23 RX ADMIN — AMIODARONE HYDROCHLORIDE: 50 INJECTION, SOLUTION INTRAVENOUS at 03:01

## 2018-01-23 RX ADMIN — INSULIN ASPART 1 UNITS: 100 INJECTION, SOLUTION INTRAVENOUS; SUBCUTANEOUS at 12:01

## 2018-01-23 RX ADMIN — HEPARIN SODIUM 5000 UNITS: 5000 INJECTION, SOLUTION INTRAVENOUS; SUBCUTANEOUS at 09:01

## 2018-01-23 RX ADMIN — PANTOPRAZOLE SODIUM 40 MG: 40 INJECTION, POWDER, FOR SOLUTION INTRAVENOUS at 09:01

## 2018-01-23 RX ADMIN — METHYLPREDNISOLONE SODIUM SUCCINATE 20 MG: 40 INJECTION, POWDER, FOR SOLUTION INTRAMUSCULAR; INTRAVENOUS at 09:01

## 2018-01-23 RX ADMIN — INSULIN ASPART 1 UNITS: 100 INJECTION, SOLUTION INTRAVENOUS; SUBCUTANEOUS at 10:01

## 2018-01-23 RX ADMIN — INSULIN ASPART 2 UNITS: 100 INJECTION, SOLUTION INTRAVENOUS; SUBCUTANEOUS at 02:01

## 2018-01-23 RX ADMIN — CIPROFLOXACIN 400 MG: 2 INJECTION, SOLUTION INTRAVENOUS at 03:01

## 2018-01-23 RX ADMIN — INSULIN ASPART 4 UNITS: 100 INJECTION, SOLUTION INTRAVENOUS; SUBCUTANEOUS at 06:01

## 2018-01-23 RX ADMIN — HEPARIN SODIUM 5000 UNITS: 5000 INJECTION, SOLUTION INTRAVENOUS; SUBCUTANEOUS at 03:01

## 2018-01-23 RX ADMIN — PANTOPRAZOLE SODIUM 40 MG: 40 INJECTION, POWDER, FOR SOLUTION INTRAVENOUS at 10:01

## 2018-01-23 RX ADMIN — METOROPROLOL TARTRATE 5 MG: 5 INJECTION, SOLUTION INTRAVENOUS at 10:01

## 2018-01-23 NOTE — PROGRESS NOTES
Patient intubated was severe acidosis, hypercapnia, marked increase in creatinine, creatinine was 1.6 and February 2017 and 2.6 in August, patient required intubation, has been cultured and started on antibiotics, cefepime Cipro and vancomycin. Consult place to nephrology pulmonary; patient is on stress steroids and a bicarbonate drip. Nephrology considering dialysis. Some initial difficulty with IV access.    1/23 patient has been extubated, he continues to have eryn arrhythmias alternating with atrial fibrillation, this has been treated with amiodarone. He remain somewhat lethargic. On amnio drip-rec change to p.o. amnio 400mg TID    HEENT-atraumatic/normocephalic   cv, tachycardia/bradycardia currently a-fib rate 63  BS rales and rhonchi, improving  ABD soft, NT  Ext no C C E    Diagnosis: shortness of breath, hypoxemia; acute respiratory failure, metabolic and respiratory combined acidosis, possible sepsis with septic shock    Disposition, overall he seems to be improving

## 2018-01-23 NOTE — PLAN OF CARE
Problem: Hemodialysis (Adult)  Goal: Signs and Symptoms of Listed Potential Problems Will be Absent, Minimized or Managed (Hemodialysis)  Signs and symptoms of listed potential problems will be absent, minimized or managed by discharge/transition of care (reference Hemodialysis (Adult) CPG).    Outcome: Ongoing (interventions implemented as appropriate)   01/23/18 1438   Hemodialysis   Problems Assessed (Hemodialysis) all   Problems Present (Hemodialysis) electrolyte imbalance;fluid imbalance   Hemodialysis with no net UF as ordered

## 2018-01-23 NOTE — PROGRESS NOTES
Renal ICU Progress Note    Date of Admission:  1/18/2018 11:28 AM      ROS:  Seen during Dialysis, asleep      Scheduled Meds:   [START ON 1/24/2018] ceFEPime (MAXIPIME) IVPB  500 mg Intravenous Q24H    ciprofloxacin  400 mg Intravenous Q24H    heparin (porcine)  5,000 Units Subcutaneous Q8H    levothyroxine  100 mcg Intravenous Daily    methylPREDNISolone sodium succinate  40 mg Intravenous Q12H    metoprolol  5 mg Intravenous Once    pantoprazole  40 mg Intravenous BID     Vital Signs Range (Last 24H):  Temp:  [98.2 °F (36.8 °C)-99.5 °F (37.5 °C)]   Pulse:  []   Resp:  [13-37]   BP: (117-164)/()   SpO2:  [94 %-98 %]     I & O (Last 24H):    Intake/Output Summary (Last 24 hours) at 01/23/18 1306  Last data filed at 01/23/18 1000   Gross per 24 hour   Intake              534 ml   Output             1545 ml   Net            -1011 ml           Physical Exam:  General appearance: well developed, well nourished, no distress  Lungs:  clear to auscultation bilaterally and normal respiratory effort  Heart: regular rate and rhythm  Abdomen: soft, non-tender non-distented; bowel sounds normal; no masses,  no organomegaly  Extremities: no cyanosis or edema, or clubbing    Laboratory:  CBC:     Recent Labs  Lab 01/23/18  0249   WBC 21.51*   RBC 3.12*   HGB 9.2*   HCT 27.1*      MCV 87   MCH 29.5   MCHC 33.9     CMP:     Recent Labs  Lab 01/21/18  0245  01/23/18  0249   *  192*  < > 188*   CALCIUM 7.1*  7.1*  < > 7.7*   ALBUMIN 2.3*  --   --    PROT 4.4*  --   --    *  135*  < > 139   K 4.1  4.1  < > 3.8   CO2 21*  21*  < > 19*     102  < > 108   BUN 39*  39*  < > 52*   CREATININE 5.1*  5.1*  < > 5.4*   ALKPHOS 78  --   --    ALT 10  --   --    AST 20  --   --    BILITOT 0.9  --   --    < > = values in this interval not displayed.  Phosphorus         14.4 4.3 4.2     Phosphorus       Assessment:    Non oliguric SOURAV requiring Hemodialysis  "  Evidence that pte. Had developed CKD stage 4 last year  S/p Acute respiratory failure extubated   A-Fib w/ intermittent RVR  S/p GI bleed remains NPO  Hx. DM type 2  Hypotension - resolved -  Suspected "sepsis" w/ SIRS criteria, so far all cultures negative  Anemia of acute loss  Resolving Metabolic (lactic) acidosis  Hypoalbuminemia    Plan:    - Dialysis resumed, seems he will probably need permanent HD  - Will need tunneled HD cath. soon  - Continue Cefepime with dose adjusted to current GFR  - Start diet when o.k. GI  - Amiodarone ggt as per Cardiology  - Collins to gravity  - Taper down IV Medrol          Arvind Proctor  1/23/2018  "

## 2018-01-23 NOTE — PROGRESS NOTES
[facility]  Cardiology  Progress Note    Patient Name: ERNA Jesus  MRN: [id number]  Admission Date: 1/18/2018  Hospital Length of Stay: 5 days  Code Status: Full Code   Attending Physician: Giancarlo Cm MD   Primary Care Physician: No primary care provider on file.  Expected Discharge Date:   Principal Problem:Metabolic acidosis    Subjective:     Hospital Course:   1/22 Bradycardic yesterday. Rates improved now. Still having episodes of A-fib RVR  Denies CP or SOB    Echo 1/19/18    1 - Normal left ventricular systolic function (EF 60-65%).     2 - Concentric hypertrophy.     3 - Trivial mitral regurgitation.     4 - Trivial to mild tricuspid regurgitation.     Interval History:     Review of Systems   Unable to perform ROS: acuity of condition     Objective:     Vital Signs (Most Recent):  Temp: 98.7 °F (37.1 °C) (01/23/18 0315)  Pulse: 66 (01/23/18 0645)  Resp: 17 (01/23/18 0645)  BP: (!) 163/65 (01/23/18 0630)  SpO2: 96 % (01/23/18 0759) Vital Signs (24h Range):  Temp:  [98.1 °F (36.7 °C)-99.5 °F (37.5 °C)] 98.7 °F (37.1 °C)  Pulse:  [62-86] 66  Resp:  [13-25] 17  SpO2:  [94 %-98 %] 96 %  BP: (128-164)/() 163/65     Weight: 69 kg (152 lb 1.9 oz)  Body mass index is 22.46 kg/m².     SpO2: 96 %  O2 Device (Oxygen Therapy): room air      Intake/Output Summary (Last 24 hours) at 01/23/18 0850  Last data filed at 01/23/18 0600   Gross per 24 hour   Intake          1570.73 ml   Output             1525 ml   Net            45.73 ml       Lines/Drains/Airways     Central Venous Catheter Line                 Hemodialysis Catheter 01/18/18 1707 right femoral 4 days         Percutaneous Central Line Insertion/Assessment - triple lumen  01/18/18 1500 right internal jugular 4 days          Drain                 Urethral Catheter 01/18/18 1200 4 days                Physical Exam   Constitutional: He is oriented to person, place, and time. He appears well-developed and well-nourished.   HENT:    Head: Normocephalic and atraumatic.   Eyes: Conjunctivae are normal. Pupils are equal, round, and reactive to light.   Neck: Normal range of motion. Neck supple.   Cardiovascular: Normal rate, normal heart sounds and intact distal pulses.    Pulmonary/Chest: Effort normal and breath sounds normal.   Abdominal: Soft. Bowel sounds are normal.   Musculoskeletal: Normal range of motion.   Neurological: He is alert and oriented to person, place, and time.   Skin: Skin is warm and dry.       Significant Labs: All pertinent lab results from the last 24 hours have been reviewed.    Significant Imaging: Echocardiogram:   2D echo with color flow doppler:   Results for orders placed or performed during the hospital encounter of 01/18/18   2D echo with color flow doppler   Result Value Ref Range    EF 60 55 - 65    Mitral Valve Regurgitation TRIVIAL     Diastolic Dysfunction No     Est. PA Systolic Pressure 31.09     Tricuspid Valve Regurgitation TRIVIAL TO MILD      Assessment and Plan:     Brief HPI:     * Metabolic acidosis             Acute respiratory failure             Elevated troponin    Suspect related to demand ischemia and ARF. Ischemic evaluation when he stabilizes        Encephalopathy, metabolic             Acute renal failure             Paroxysmal tachycardia    Appears to be PAF, currently back in A-fib RVR.  Echo with good EF. Continue with IV amiodarone. On SC heparin        Lactic acid acidosis                 VTE Risk Mitigation         Ordered     heparin (porcine) injection 5,000 Units  Every 8 hours     Route:  Subcutaneous        01/18/18 1551     Place sequential compression device  Until discontinued      01/18/18 2016     heparin (porcine) injection 5,000 Units  As needed (PRN)     Route:  Intra-Catheter        01/18/18 1757     Medium Risk of VTE  Once      01/18/18 1551          Arley Rajput MD  Cardiology  [facility]

## 2018-01-23 NOTE — ASSESSMENT & PLAN NOTE
Appears to be PAF, currently back in A-fib RVR.  Echo with good EF. Continue with IV amiodarone. On SC heparin

## 2018-01-23 NOTE — PLAN OF CARE
Problem: Patient Care Overview  Goal: Plan of Care Review  Outcome: Ongoing (interventions implemented as appropriate)  Patient is awake and alert. Disoriented to place and situation. Follows commands. On RA. NSR on the monitor with frequent PAC's/PVC's. BP WNL. Amio gtt continues. UO WNL. No BM's this shift. No skin breakdown, falls, or injuries this shift.

## 2018-01-23 NOTE — SUBJECTIVE & OBJECTIVE
Interval History:     Review of Systems   Unable to perform ROS: acuity of condition     Objective:     Vital Signs (Most Recent):  Temp: 98.7 °F (37.1 °C) (01/23/18 0315)  Pulse: 66 (01/23/18 0645)  Resp: 17 (01/23/18 0645)  BP: (!) 163/65 (01/23/18 0630)  SpO2: 96 % (01/23/18 0759) Vital Signs (24h Range):  Temp:  [98.1 °F (36.7 °C)-99.5 °F (37.5 °C)] 98.7 °F (37.1 °C)  Pulse:  [62-86] 66  Resp:  [13-25] 17  SpO2:  [94 %-98 %] 96 %  BP: (128-164)/() 163/65     Weight: 69 kg (152 lb 1.9 oz)  Body mass index is 22.46 kg/m².     SpO2: 96 %  O2 Device (Oxygen Therapy): room air      Intake/Output Summary (Last 24 hours) at 01/23/18 0850  Last data filed at 01/23/18 0600   Gross per 24 hour   Intake          1570.73 ml   Output             1525 ml   Net            45.73 ml       Lines/Drains/Airways     Central Venous Catheter Line                 Hemodialysis Catheter 01/18/18 1707 right femoral 4 days         Percutaneous Central Line Insertion/Assessment - triple lumen  01/18/18 1500 right internal jugular 4 days          Drain                 Urethral Catheter 01/18/18 1200 4 days                Physical Exam   Constitutional: He is oriented to person, place, and time. He appears well-developed and well-nourished.   HENT:   Head: Normocephalic and atraumatic.   Eyes: Conjunctivae are normal. Pupils are equal, round, and reactive to light.   Neck: Normal range of motion. Neck supple.   Cardiovascular: Normal rate, normal heart sounds and intact distal pulses.    Pulmonary/Chest: Effort normal and breath sounds normal.   Abdominal: Soft. Bowel sounds are normal.   Musculoskeletal: Normal range of motion.   Neurological: He is alert and oriented to person, place, and time.   Skin: Skin is warm and dry.       Significant Labs: All pertinent lab results from the last 24 hours have been reviewed.    Significant Imaging: Echocardiogram:   2D echo with color flow doppler:   Results for orders placed or performed  during the hospital encounter of 01/18/18   2D echo with color flow doppler   Result Value Ref Range    EF 60 55 - 65    Mitral Valve Regurgitation TRIVIAL     Diastolic Dysfunction No     Est. PA Systolic Pressure 31.09     Tricuspid Valve Regurgitation TRIVIAL TO MILD

## 2018-01-23 NOTE — PLAN OF CARE
Problem: Patient Care Overview  Goal: Plan of Care Review  Outcome: Ongoing (interventions implemented as appropriate)  Patient remains on Amiodarone drip at 1 mg/min due to arrhythmias. He received HD at bedside today with no fluid removed,only filtration. His urine output has above limit without difficulties. He has been started on a 200 ada renal diet and is tolerating well. He denies pain/discomfort at this time. His sister has been at the bedside throughout the day and updated on his condition and planning. He sustained no injury, fall or trauma this shift.

## 2018-01-23 NOTE — PROGRESS NOTES
He is no longer intubated or on pressors.  The patient denies having any diarrhea.  He wishes to eat solid food.    Vitals:    01/23/18 0500 01/23/18 0515 01/23/18 0530 01/23/18 0545   BP: (!) 148/65  (!) 143/64    Pulse: 64 63 62 67   Resp: 17 16 17 17   Temp:       TempSrc:       SpO2: 96% 96% 96% 97%   Weight:       Height:          [START ON 1/24/2018] ceFEPime (MAXIPIME) IVPB  500 mg Intravenous Q24H    ciprofloxacin  400 mg Intravenous Q24H    heparin (porcine)  5,000 Units Subcutaneous Q8H    levothyroxine  100 mcg Intravenous Daily    methylPREDNISolone sodium succinate  40 mg Intravenous Q12H    pantoprazole  40 mg Intravenous BID    Banana bag   Intravenous Daily     P.E.:  GEN: A x O x 3, NAD  HEENT: EOMI, PERRL, anicteric sclera  CV: RRR, no M/R/G  Chest: CTA B  Abdomen: soft, NTND, normoactive BS  Ext: No C/C/E. 2+ dorsalis pedis pulses B    Labs:  Recent Results (from the past 336 hour(s))   CBC auto differential    Collection Time: 01/23/18  2:49 AM   Result Value Ref Range    WBC 21.51 (H) 3.90 - 12.70 K/uL    Hemoglobin 9.2 (L) 14.0 - 18.0 g/dL    Hematocrit 27.1 (L) 40.0 - 54.0 %    Platelets 156 150 - 350 K/uL   CBC auto differential    Collection Time: 01/22/18  4:15 AM   Result Value Ref Range    WBC 20.80 (H) 3.90 - 12.70 K/uL    Hemoglobin 9.4 (L) 14.0 - 18.0 g/dL    Hematocrit 28.7 (L) 40.0 - 54.0 %    Platelets 165 150 - 350 K/uL   CBC auto differential    Collection Time: 01/21/18  2:45 AM   Result Value Ref Range    WBC 20.50 (H) 3.90 - 12.70 K/uL    Hemoglobin 9.4 (L) 14.0 - 18.0 g/dL    Hematocrit 28.0 (L) 40.0 - 54.0 %    Platelets 189 150 - 350 K/uL     CMP  Sodium   Date Value Ref Range Status   01/23/2018 139 136 - 145 mmol/L Final     Potassium   Date Value Ref Range Status   01/23/2018 3.8 3.5 - 5.1 mmol/L Final     Chloride   Date Value Ref Range Status   01/23/2018 108 95 - 110 mmol/L Final     CO2   Date Value Ref Range Status   01/23/2018 19 (L) 23 - 29 mmol/L Final      Glucose   Date Value Ref Range Status   01/23/2018 188 (H) 70 - 110 mg/dL Final     BUN, Bld   Date Value Ref Range Status   01/23/2018 52 (H) 8 - 23 mg/dL Final     Creatinine   Date Value Ref Range Status   01/23/2018 5.4 (H) 0.5 - 1.4 mg/dL Final     Calcium   Date Value Ref Range Status   01/23/2018 7.7 (L) 8.7 - 10.5 mg/dL Final     Total Protein   Date Value Ref Range Status   01/21/2018 4.4 (L) 6.0 - 8.4 g/dL Final     Albumin   Date Value Ref Range Status   01/21/2018 2.3 (L) 3.5 - 5.2 g/dL Final     Total Bilirubin   Date Value Ref Range Status   01/21/2018 0.9 0.1 - 1.0 mg/dL Final     Comment:     For infants and newborns, interpretation of results should be based  on gestational age, weight and in agreement with clinical  observations.  Premature Infant recommended reference ranges:  Up to 24 hours.............<8.0 mg/dL  Up to 48 hours............<12.0 mg/dL  3-5 days..................<15.0 mg/dL  6-29 days.................<15.0 mg/dL       Alkaline Phosphatase   Date Value Ref Range Status   01/21/2018 78 55 - 135 U/L Final     AST   Date Value Ref Range Status   01/21/2018 20 10 - 40 U/L Final     ALT   Date Value Ref Range Status   01/21/2018 10 10 - 44 U/L Final     Anion Gap   Date Value Ref Range Status   01/23/2018 12 8 - 16 mmol/L Final     eGFR if    Date Value Ref Range Status   01/23/2018 12 (A) >60 mL/min/1.73 m^2 Final     eGFR if non    Date Value Ref Range Status   01/23/2018 10 (A) >60 mL/min/1.73 m^2 Final     Comment:     Calculation used to obtain the estimated glomerular filtration  rate (eGFR) is the CKD-EPI equation.          No results for input(s): PT, INR, APTT in the last 24 hours.    CT of Abdomen/Pelvis:    Cholelithiasis with a contracted gallbladder.  5 mm nonobstructing left lower pole renal calculus.  Bibasilar atelectasis/consolidation. Basilar pneumonia cannot be excluded.  Possible colonic wall thickening involving the ascending  colon. This may be related to decompression of the colon, however colitis cannot be excluded.   Collins catheter within a decompressed urinary bladder.    A/P:  The patient is a 67 year old man with no past medical history presenting with atrial fibrillation, respiratory failure requiring intubation, sepsis requiring pressors, coffee ground emesis, and colitis.  1.  Coffee Ground Emesis - he has had no evidence of bleeding for several days.  His H/H improved after he was transfused pRBCs and protonix can be continued.  His aspirate was bilious.  The patient is being advanced to a regular diet.  2.  Colitis - the patient does not have diarrhea.  Antibiotics can be continued for a 14 day course.    He can follow-up in GI Clinic.  Gastroenterology has nothing additional to add.  Please call GI with any further issues.

## 2018-01-24 LAB
ANION GAP SERPL CALC-SCNC: 7 MMOL/L
BASOPHILS # BLD AUTO: 0.01 K/UL
BASOPHILS NFR BLD: 0.1 %
BUN SERPL-MCNC: 33 MG/DL
CALCIUM SERPL-MCNC: 7.6 MG/DL
CHLORIDE SERPL-SCNC: 107 MMOL/L
CO2 SERPL-SCNC: 26 MMOL/L
CREAT SERPL-MCNC: 3.5 MG/DL
DIFFERENTIAL METHOD: ABNORMAL
EOSINOPHIL # BLD AUTO: 0.1 K/UL
EOSINOPHIL NFR BLD: 0.4 %
ERYTHROCYTE [DISTWIDTH] IN BLOOD BY AUTOMATED COUNT: 13.6 %
EST. GFR  (AFRICAN AMERICAN): 20 ML/MIN/1.73 M^2
EST. GFR  (NON AFRICAN AMERICAN): 17 ML/MIN/1.73 M^2
GLUCOSE SERPL-MCNC: 183 MG/DL
HCT VFR BLD AUTO: 27.2 %
HGB BLD-MCNC: 9.1 G/DL
LYMPHOCYTES # BLD AUTO: 0.8 K/UL
LYMPHOCYTES NFR BLD: 4.4 %
MCH RBC QN AUTO: 29.2 PG
MCHC RBC AUTO-ENTMCNC: 33.5 G/DL
MCV RBC AUTO: 87 FL
MONOCYTES # BLD AUTO: 1.2 K/UL
MONOCYTES NFR BLD: 6.8 %
NEUTROPHILS # BLD AUTO: 15.4 K/UL
NEUTROPHILS NFR BLD: 87.8 %
PLATELET # BLD AUTO: 144 K/UL
PMV BLD AUTO: 11.4 FL
POCT GLUCOSE: 223 MG/DL (ref 70–110)
POCT GLUCOSE: 248 MG/DL (ref 70–110)
POTASSIUM SERPL-SCNC: 3.2 MMOL/L
RBC # BLD AUTO: 3.12 M/UL
SODIUM SERPL-SCNC: 140 MMOL/L
WBC # BLD AUTO: 17.51 K/UL

## 2018-01-24 PROCEDURE — 99232 SBSQ HOSP IP/OBS MODERATE 35: CPT | Mod: ,,, | Performed by: INTERNAL MEDICINE

## 2018-01-24 PROCEDURE — 25000003 PHARM REV CODE 250: Performed by: HOSPITALIST

## 2018-01-24 PROCEDURE — 80048 BASIC METABOLIC PNL TOTAL CA: CPT

## 2018-01-24 PROCEDURE — 25000003 PHARM REV CODE 250: Performed by: INTERNAL MEDICINE

## 2018-01-24 PROCEDURE — 36415 COLL VENOUS BLD VENIPUNCTURE: CPT

## 2018-01-24 PROCEDURE — 25000003 PHARM REV CODE 250: Performed by: FAMILY MEDICINE

## 2018-01-24 PROCEDURE — 63600175 PHARM REV CODE 636 W HCPCS: Performed by: NURSE PRACTITIONER

## 2018-01-24 PROCEDURE — 63600175 PHARM REV CODE 636 W HCPCS: Performed by: FAMILY MEDICINE

## 2018-01-24 PROCEDURE — 85025 COMPLETE CBC W/AUTO DIFF WBC: CPT

## 2018-01-24 PROCEDURE — 25000003 PHARM REV CODE 250: Performed by: NURSE PRACTITIONER

## 2018-01-24 PROCEDURE — 86580 TB INTRADERMAL TEST: CPT | Performed by: NURSE PRACTITIONER

## 2018-01-24 PROCEDURE — 20000000 HC ICU ROOM

## 2018-01-24 PROCEDURE — C9113 INJ PANTOPRAZOLE SODIUM, VIA: HCPCS | Performed by: INTERNAL MEDICINE

## 2018-01-24 PROCEDURE — 63600175 PHARM REV CODE 636 W HCPCS: Performed by: EMERGENCY MEDICINE

## 2018-01-24 PROCEDURE — 63600175 PHARM REV CODE 636 W HCPCS: Performed by: INTERNAL MEDICINE

## 2018-01-24 PROCEDURE — 97110 THERAPEUTIC EXERCISES: CPT

## 2018-01-24 RX ORDER — DOCUSATE SODIUM 100 MG/1
100 CAPSULE, LIQUID FILLED ORAL 2 TIMES DAILY
Status: DISCONTINUED | OUTPATIENT
Start: 2018-01-24 | End: 2018-01-26 | Stop reason: HOSPADM

## 2018-01-24 RX ORDER — INSULIN ASPART 100 [IU]/ML
1-10 INJECTION, SOLUTION INTRAVENOUS; SUBCUTANEOUS
Status: DISCONTINUED | OUTPATIENT
Start: 2018-01-24 | End: 2018-01-26 | Stop reason: HOSPADM

## 2018-01-24 RX ADMIN — DOCUSATE SODIUM 100 MG: 100 CAPSULE, LIQUID FILLED ORAL at 09:01

## 2018-01-24 RX ADMIN — METHYLPREDNISOLONE SODIUM SUCCINATE 20 MG: 40 INJECTION, POWDER, FOR SOLUTION INTRAMUSCULAR; INTRAVENOUS at 09:01

## 2018-01-24 RX ADMIN — HEPARIN SODIUM 5000 UNITS: 5000 INJECTION, SOLUTION INTRAVENOUS; SUBCUTANEOUS at 03:01

## 2018-01-24 RX ADMIN — Medication 5 UNITS: at 03:01

## 2018-01-24 RX ADMIN — CEFEPIME 500 MG: 1 INJECTION, POWDER, FOR SOLUTION INTRAMUSCULAR; INTRAVENOUS at 02:01

## 2018-01-24 RX ADMIN — PIPERACILLIN AND TAZOBACTAM 4.5 G: 4; .5 INJECTION, POWDER, LYOPHILIZED, FOR SOLUTION INTRAVENOUS; PARENTERAL at 03:01

## 2018-01-24 RX ADMIN — METHYLPREDNISOLONE SODIUM SUCCINATE 20 MG: 40 INJECTION, POWDER, FOR SOLUTION INTRAMUSCULAR; INTRAVENOUS at 08:01

## 2018-01-24 RX ADMIN — INSULIN ASPART 4 UNITS: 100 INJECTION, SOLUTION INTRAVENOUS; SUBCUTANEOUS at 11:01

## 2018-01-24 RX ADMIN — HEPARIN SODIUM 5000 UNITS: 5000 INJECTION, SOLUTION INTRAVENOUS; SUBCUTANEOUS at 06:01

## 2018-01-24 RX ADMIN — AMIODARONE HYDROCHLORIDE: 50 INJECTION, SOLUTION INTRAVENOUS at 07:01

## 2018-01-24 RX ADMIN — PANTOPRAZOLE SODIUM 40 MG: 40 INJECTION, POWDER, FOR SOLUTION INTRAVENOUS at 09:01

## 2018-01-24 RX ADMIN — LEVOTHYROXINE SODIUM ANHYDROUS 100 MCG: 100 INJECTION, POWDER, LYOPHILIZED, FOR SOLUTION INTRAVENOUS at 08:01

## 2018-01-24 RX ADMIN — INSULIN ASPART 4 UNITS: 100 INJECTION, SOLUTION INTRAVENOUS; SUBCUTANEOUS at 04:01

## 2018-01-24 RX ADMIN — ACETAMINOPHEN 650 MG: 650 SOLUTION ORAL at 12:01

## 2018-01-24 RX ADMIN — PANTOPRAZOLE SODIUM 40 MG: 40 INJECTION, POWDER, FOR SOLUTION INTRAVENOUS at 08:01

## 2018-01-24 RX ADMIN — INSULIN DETEMIR 12 UNITS: 100 INJECTION, SOLUTION SUBCUTANEOUS at 09:01

## 2018-01-24 NOTE — PLAN OF CARE
01/24/18 1205   Discharge Reassessment   Assessment Type Discharge Planning Reassessment   Provided patient/caregiver education on the expected discharge date and the discharge plan Yes   Do you have any problems affording any of your prescribed medications? No   Discharge Plan A New Nursing Home placement - [facility] care facility   Discharge Plan B Return to Nursing Home   Patient choice form signed by patient/caregiver No   Can the patient answer the patient profile reliably? No, cognitively impaired  (variable: can provide general information, knows self, family, in hospital; poor historian,)   How does the patient rate their overall health at the present time? Poor   Describe the patient's ability to walk at the present time. Does not walk or unable to take any steps at all   How often would a person be available to care for the patient? Whenever needed   Number of comorbid conditions (as recorded on the chart) Five or more   During the past month, has the patient often been bothered by feeling down, depressed or hopeless? No  (record)   During the past month, has the patient often been bothered by little interest or pleasure in doing things? No   Baseline patient lives at [facility]. Patient remains in ICU on amio gtt. SW reviewed chart, discussed with Dr Proctor, nephology,  briefly and met with patient and his sister Naomie in ICU room. Dr Proctor informs that patient will need out patient HD, patient agrees. SW explained that will assist with arranging the HD prior to discharge, that [facility] facility will provide transportation to facility.    Additionally patient reported to his sister and Dr Proctor that he would like a different nursing home. SW explained that patient has the right to change facilities, that if cannot be accomplished at discharge the SW at the facility can continue to assist patient to relocate. Upon further discussion, learned that patient's son in [place] has patient on  waiting list at several facilities near son's home. With regard to different facility in this area, SW provided 2 copies of all within 25 miles of this hospital printed from medicare web site. Provided education regarding use of Medicare.gov.  SW explained can send patient information to facilities of choice, that need the preferences as soon as possible. Can send information to [place] if have information regarding facilities.   MARY ALICE responded to voice mail from Kaylee at [facility], called her with update. Explained patient will need out patient hemodialysis, may be in hospital several more days. Inquired regarding facilities used by their patients: [facility] on [address], [facility] on [address] and [facility] on [facility] in [place].   SW will start with one of these locations (no preference as yet) with goal of using a company that also is available in [place] in event patient can move near his son.  MARY ALICE will follow in ICU and assist as needed.

## 2018-01-24 NOTE — NURSING
Spoke with LUPILLO Freire to request order for TB skin test-SW said that pt needs current TB skin test for LTAC/outpt hemodialysis. NP said okay to order.  Also notified that pt has no BMs reported since admission. NP said to order 100mg colace BID.

## 2018-01-24 NOTE — PROGRESS NOTES
Renal ICU Progress Note    Date of Admission:  1/18/2018 11:28 AM      ROS:  Voices no new complaints      Scheduled Meds:   ceFEPime (MAXIPIME) IVPB  500 mg Intravenous Q24H    ciprofloxacin  400 mg Intravenous Q24H    heparin (porcine)  5,000 Units Subcutaneous Q8H    levothyroxine  100 mcg Intravenous Daily    methylPREDNISolone sodium succinate  20 mg Intravenous Q12H    pantoprazole  40 mg Intravenous BID     Vital Signs Range (Last 24H):  Temp:  [98.2 °F (36.8 °C)-98.9 °F (37.2 °C)]   Pulse:  []   Resp:  [16-37]   BP: (134-180)/()   SpO2:  [94 %-100 %]     I & O (Last 24H):    Intake/Output Summary (Last 24 hours) at 01/24/18 1050  Last data filed at 01/24/18 1001   Gross per 24 hour   Intake          1853.44 ml   Output             1035 ml   Net           818.44 ml           Physical Exam:  General appearance: well developed, well nourished, no distress  Lungs:  clear to auscultation bilaterally and normal respiratory effort  Heart: regular rate and rhythm  Abdomen: soft, non-tender non-distented; bowel sounds normal; no masses,  no organomegaly  Extremities: no cyanosis or edema, or clubbing    Laboratory:  CBC:     Recent Labs  Lab 01/24/18  0320   WBC 17.51*   RBC 3.12*   HGB 9.1*   HCT 27.2*   *   MCV 87   MCH 29.2   MCHC 33.5     CMP:     Recent Labs  Lab 01/21/18  0245  01/24/18  0320   *  192*  < > 183*   CALCIUM 7.1*  7.1*  < > 7.6*   ALBUMIN 2.3*  --   --    PROT 4.4*  --   --    *  135*  < > 140   K 4.1  4.1  < > 3.2*   CO2 21*  21*  < > 26     102  < > 107   BUN 39*  39*  < > 33*   CREATININE 5.1*  5.1*  < > 3.5*   ALKPHOS 78  --   --    ALT 10  --   --    AST 20  --   --    BILITOT 0.9  --   --    < > = values in this interval not displayed.  Phosphorus         14.4 4.3 4.2     Phosphorus       Assessment:    Non oliguric SOURAV on top of CKD-4 requiring Hemodialysis   Suspect pte. Has ESRD and will not recover from  "this insult  S/p Acute respiratory failure extubated   A-Fib w/ intermittent RVR  S/p GI bleed remains NPO  Hx. DM type 2  Hypotension - resolved -  Suspected "sepsis" w/ SIRS criteria, so far all cultures negative  Anemia of acute loss  Resolving Metabolic (lactic) acidosis  Hypoalbuminemia    Plan:    - Dialysis Q MWF  - Schedule tunneled HD cath. Placement in a.m.   - Continue Cefepime with dose adjusted to current GFR  - Renal ADA diet  - Amiodarone ggt as per Cardiology  - Collins to gravity in order to do 24h urine  - Taper down IV Medrol    Need for Continuing Dialysis and insertion of tunneled cath. Discussed with Pte. And sister in detail. Pte. Wants to proceed.            Arvind Proctor  1/24/2018  "

## 2018-01-24 NOTE — NURSING
Potts emptied. 24hr urine collection container place in room on ice; potts drainage bag on ice. 24hr urine for creatinine clearance started at 1130.

## 2018-01-24 NOTE — NURSING
Magaly Freire, NP for Dr. Cm, called unit after speaking with Dr. Figueredo from Infectious Disease.  NP said to d/c cipro and cefepime abx and to add vancomycin 1g IV to be given with hemodialysis and zosyn 3.375g q8hrs and to have pharmacy adjust for renal dosing.

## 2018-01-24 NOTE — ASSESSMENT & PLAN NOTE
Appears to be PAF, currently back in NSR.  Echo with good EF. Decrease IV amiodarone. On SC heparin

## 2018-01-24 NOTE — PLAN OF CARE
Problem: Physical Therapy Goal  Goal: Physical Therapy Goal  Goals to be met by: 18    Patient will increase functional independence with mobility by performin. Supine to sit with Minimal Assistance  2. Sit to stand transfer with Minimal Assistance  3. Bed to chair transfer with Minimal Assistance using Rolling Walker  4. Gait  x 25 feet with Minimal Assistance using Rolling Walker  5. Lower extremity exercise program x30 reps per handout, with supervision     Outcome: Ongoing (interventions implemented as appropriate)  Pt still appropriate for skilled physical therapy

## 2018-01-24 NOTE — PROGRESS NOTES
Progress Note    Admit Date: 1/18/2018   LOS: 6 days     SUBJECTIVE:     Follow-up For:  Acute Renal failure with severe metabolic acidosis    Scheduled Meds:   ceFEPime (MAXIPIME) IVPB  500 mg Intravenous Q24H    ciprofloxacin  400 mg Intravenous Q24H    heparin (porcine)  5,000 Units Subcutaneous Q8H    levothyroxine  100 mcg Intravenous Daily    methylPREDNISolone sodium succinate  20 mg Intravenous Q12H    pantoprazole  40 mg Intravenous BID     Continuous Infusions:   custom IV infusion builder (for pharmacist use only) 16.7 mL/hr at 01/24/18 1201    norepinephrine bitartrate-D5W Stopped (01/20/18 1000)    propofol Stopped (01/20/18 1100)     PRN Meds:sodium chloride, sodium chloride 0.9%, acetaminophen, [COMPLETED] calcium gluconate IVPB **AND** calcium gluconate IVPB, dextrose 50%, glucagon (human recombinant), heparin (porcine), insulin aspart, ondansetron, propofol    Review of patient's allergies indicates:  No Known Allergies    Review of Systems  Constitutional: positive for fatigue  Eyes: negative  Ears, nose, mouth, throat, and face: negative  Respiratory: positive for cough  Cardiovascular: positive for palpitations and three - four beat runs of V-tach  Gastrointestinal: negative for abdominal pain, melena, nausea and vomiting  Genitourinary:negative, + bloody stool  Musculoskeletal:positive for myalgias  Behavioral/Psych: negative  Endocrine: negative    OBJECTIVE:     Vital Signs (Most Recent)  Temp: 98.5 °F (36.9 °C) (01/24/18 1115)  Pulse: 72 (01/24/18 1100)  Resp: 18 (01/24/18 1100)  BP: (!) 168/76 (01/24/18 1100)  SpO2: 96 % (01/24/18 1100)    Vital Signs Range (Last 24H):  Temp:  [98.2 °F (36.8 °C)-98.9 °F (37.2 °C)]   Pulse:  []   Resp:  [16-34]   BP: (139-180)/()   SpO2:  [94 %-100 %]     I & O (Last 24H):    Intake/Output Summary (Last 24 hours) at 01/24/18 1205  Last data filed at 01/24/18 1201   Gross per 24 hour   Intake          1820.24 ml   Output              980  ml   Net           840.24 ml     Physical Exam:  General: no distress  HENT: Head:normocephalic, atraumatic. Ears:bilateral TM's and external ear canals normal. Nose: Nares normal. Septum midline. Mucosa normal. No drainage or sinus tenderness., no discharge. Throat: lips, mucosa, and tongue normal; teeth and gums normal and no throat erythema.  Eyes: conjunctivae/corneas clear. PERRL.   Neck: supple, symmetrical, trachea midline, no JVD and thyroid not enlarged, symmetric, no tenderness/mass/nodules  Lungs:  clear to auscultation bilaterally, normal respiratory effort and rhonchi bilaterally  Cardiovascular: Heart: irregularly irregular rhythm and tachy eryn episoded=s. Chest Wall: no tenderness. Extremities: no cyanosis or edema, or clubbing. Pulses: 2+ and symmetric.  Abdomen/Rectal: Abdomen: soft, non-tender non-distented; bowel sounds normal; no masses,  no organomegaly. Rectal: not examined  Skin: Skin color, texture, turgor normal. No rashes or lesions  Musculoskeletal:no clubbing, cyanosis  Neurologic: Mental status: Alert, oriented, thought content appropriate    Laboratory:  CBC:     Recent Labs  Lab 01/24/18  0320   WBC 17.51*   RBC 3.12*   HGB 9.1*   HCT 27.2*   *   MCV 87   MCH 29.2   MCHC 33.5     BMP:   Recent Labs  Lab 01/20/18  2153  01/24/18  0320   GLU  --   < > 183*   NA  --   < > 140   K  --   < > 3.2*   CL  --   < > 107   CO2  --   < > 26   BUN  --   < > 33*   CREATININE  --   < > 3.5*   CALCIUM  --   < > 7.6*   MG 2.0  --   --    < > = values in this interval not displayed.  CMP:     Recent Labs  Lab 01/21/18  0245  01/24/18  0320   *  192*  < > 183*   CALCIUM 7.1*  7.1*  < > 7.6*   ALBUMIN 2.3*  --   --    PROT 4.4*  --   --    *  135*  < > 140   K 4.1  4.1  < > 3.2*   CO2 21*  21*  < > 26     102  < > 107   BUN 39*  39*  < > 33*   CREATININE 5.1*  5.1*  < > 3.5*   ALKPHOS 78  --   --    ALT 10  --   --    AST 20  --   --    BILITOT 0.9  --   --    < > =  values in this interval not displayed.  LFTs:     Recent Labs  Lab 01/21/18  0245   ALT 10   AST 20   ALKPHOS 78   BILITOT 0.9   PROT 4.4*   ALBUMIN 2.3*     Coagulation: No results for input(s): LABPROT, INR, APTT in the last 168 hours.  Cardiac markers:   Recent Labs  Lab 01/19/18  0307  01/21/18  1520   CPKMB 19.8*  --   --    TROPONINI 1.557*  < > 0.362*   < > = values in this interval not displayed.  ABGs:     Recent Labs  Lab 01/20/18  1202   PH 7.462*   PCO2 31.9*   PO2 112*   HCO3 22.8*   POCSATURATED 99   BE -1     Microbiology Results (last 7 days)     Procedure Component Value Units Date/Time    Blood culture [[id number]] Collected:  01/18/18 1315    Order Status:  Completed Specimen:  Blood from Peripheral, Antecubital, Left Updated:  01/23/18 1503     Blood Culture, Routine No growth after 5 days.    Blood culture [[id number]] Collected:  01/18/18 1330    Order Status:  Completed Specimen:  Blood from Peripheral, Antecubital, Left Updated:  01/23/18 1503     Blood Culture, Routine No growth after 5 days.    Urine culture [[id number]] Collected:  01/18/18 1429    Order Status:  Completed Specimen:  Urine from Urine, Catheterized Updated:  01/20/18 0840     Urine Culture, Routine No growth        Specimen (12h ago through future)    None          Recent Labs  Lab 01/18/18  1429   COLORU Yellow   SPECGRAV 1.010   PHUR 5.0   PROTEINUA 1+*   BACTERIA Few*   NITRITE Negative   LEUKOCYTESUR Negative   UROBILINOGEN Negative   HYALINECASTS 1         Diagnostic Results:  Labs: Reviewed  ECG: Reviewed  X-Ray: Reviewed  CT: Reviewed  reviewed    ASSESSMENT/PLAN:     .1. Septic Shock -resolved  2. Acute renal failure most likely s/t infection or Dehydration -improving slowly  3. Severe Metabolic acidosis - s/t renal failure -improving off HD  4. New on set A-Fib - restart Amiodarone monitor rate -give metoprolol 5 IV for rate control.  5. Troponin elevated - will trend out and consult cards maybe s/t renal  impairment  6. Essential HTN - stable  7. Hyperkalemia - s/t acute renal failure - resolved  8. Normocytic Anemia - will investigate -s/p transfusion - stable  9. Hypothermia - s/t Shock - resolved  10. H/O T2DM - continue s/s insulin steroid being used  11. Leukocytosis -blood cultures negative so far   12. Hypothyroidism by history. -monitor  13. Throat discomfort with swallowing which is mostly s/t trauma with intubation- improved    14. Right great toe  osteomyelitis - start Vanc and zosyn pharm to renal dose.     Plan:plan LTac placement. Spoke with pateint's sister in detail at bedside about plan of care

## 2018-01-24 NOTE — NURSING
LUPILLO Freire at bedside to assess pt. Notified that accuchecks only ordered q6hrs as of now. NP said okay to change to ACHS since pt is eating. LUPILLO Freire also ordered 12 units levemir qHS, d/t persistently high CBG.

## 2018-01-24 NOTE — NURSING
Noted that pharmacy had changed zosyn 3.375g q8hrs to 4.5g q12hrs.  Clarified with pharmacist on the change (since NP Tani said to order 3.375g q8hr and have pharmacy adjust for renal dosing) . Erickson Ortega, PharmD said it is standard for hemodialysis to receive zosyn 4.5g q12hrs.

## 2018-01-24 NOTE — PROGRESS NOTES
[facility]  Cardiology  Progress Note    Patient Name: ERNA Jesus  MRN: [id number]  Admission Date: 1/18/2018  Hospital Length of Stay: 6 days  Code Status: Full Code   Attending Physician: Giancarlo Cm MD   Primary Care Physician: No primary care provider on file.  Expected Discharge Date:   Principal Problem:Metabolic acidosis    Subjective:     Hospital Course:   1/22 Bradycardic yesterday. Rates improved now. Still having episodes of A-fib RVR  1/24 NSR with frequent PACs  Denies CP or SOB    Echo 1/19/18    1 - Normal left ventricular systolic function (EF 60-65%).     2 - Concentric hypertrophy.     3 - Trivial mitral regurgitation.     4 - Trivial to mild tricuspid regurgitation.     No new subjective & objective note has been filed under this hospital service since the last note was generated.    Assessment and Plan:     Brief HPI:     * Metabolic acidosis             Acute respiratory failure             Elevated troponin    Suspect related to demand ischemia and ARF. Ischemic evaluation when he stabilizes        Encephalopathy, metabolic             Acute renal failure             Paroxysmal tachycardia    Appears to be PAF, currently back in NSR.  Echo with good EF. Decrease IV amiodarone. On SC heparin        Lactic acid acidosis                 VTE Risk Mitigation         Ordered     heparin (porcine) injection 5,000 Units  Every 8 hours     Route:  Subcutaneous        01/18/18 1551     Place sequential compression device  Until discontinued      01/18/18 2016     heparin (porcine) injection 5,000 Units  As needed (PRN)     Route:  Intra-Catheter        01/18/18 1757     Medium Risk of VTE  Once      01/18/18 1551          Arley Rajput MD  Cardiology  [facility]

## 2018-01-24 NOTE — NURSING
Spoke with Dr. Proctor regarding q8hr heparin and tunneled dialysis access procedure tomorrow.  MD said okay to give 1400 dose, but hold subsequent doses in preparation for procedure.

## 2018-01-24 NOTE — PT/OT/SLP PROGRESS
Physical Therapy Treatment    Patient Name:  ERNA Jesus   MRN:  [id number]    Recommendations:     Discharge Recommendations:  nursing facility, skilled   Discharge Equipment Recommendations:  (TBD)   Barriers to discharge: None    Assessment:     ERNA Jesus is a 67 y.o. male admitted with a medical diagnosis of Metabolic acidosis.  He presents with the following impairments/functional limitations:  weakness, impaired endurance, impaired self care skills, gait instability, impaired functional mobilty, impaired balance, decreased coordination, decreased upper extremity function, decreased lower extremity function, decreased safety awareness, impaired coordination, impaired cardiopulmonary response to activity .    Rehab Prognosis:  F; patient would benefit from acute skilled PT services to address these deficits and reach maximum level of function.      Recent Surgery: * No surgery found *      Plan:     During this hospitalization, patient to be seen 2 x/week to address the above listed problems via gait training, therapeutic activities, therapeutic exercises, wheelchair management/training  · Plan of Care Expires:  02/05/18   Plan of Care Reviewed with: patient    Subjective     Communicated with nurse Muñiz prior to session.  Patient found supine with nurse present upon PT entry to room, agreeable to treatment.      Chief Complaint: sensitive feet  Patient comments/goals: no pain  Pain/Comfort:  · Pain Rating 1: 0/10  · Pain Rating Post-Intervention 1: 0/10    Patients cultural, spiritual, Baptism conflicts given the current situation:  (none)    Objective:     Patient found with: bed alarm, blood pressure cuff, potts catheter, oxygen, peripheral IV, PRAFO, telemetry, central line     General Precautions: Standard, fall (sunitha catheter in R femoral artery)   Orthopedic Precautions:N/A   Braces: N/A     Functional Mobility:      AM-PAC 6 CLICK MOBILITY  Turning over in bed (including adjusting  bedclothes, sheets and blankets)?: 2  Sitting down on and standing up from a chair with arms (e.g., wheelchair, bedside commode, etc.): 2  Moving from lying on back to sitting on the side of the bed?: 2  Moving to and from a bed to a chair (including a wheelchair)?: 2  Need to walk in hospital room?: 1  Climbing 3-5 steps with a railing?: 1  Total Score: 10       Therapeutic Activities and Exercises:   BUE Kiko 2x10 supine; shoulder flexion, chest prest, elbow flex/ext  BLE Kiko 2x5 supine; HS RLE to 30 degrees only due to cath  PROM and stretching to B ankles    Patient left supine with all lines intact, call button in reach, bed alarm on and NURSE NOTIFIED..    GOALS:    Physical Therapy Goals        Problem: Physical Therapy Goal    Goal Priority Disciplines Outcome Goal Variances Interventions   Physical Therapy Goal     PT/OT, PT Ongoing (interventions implemented as appropriate)     Description:  Goals to be met by: 18    Patient will increase functional independence with mobility by performin. Supine to sit with Minimal Assistance  2. Sit to stand transfer with Minimal Assistance  3. Bed to chair transfer with Minimal Assistance using Rolling Walker  4. Gait  x 25 feet with Minimal Assistance using Rolling Walker  5. Lower extremity exercise program x30 reps per handout, with supervision                      Time Tracking:     PT Received On: 18  PT Start Time: 1350     PT Stop Time: 1416  PT Total Time (min): 26 min     Billable Minutes: Therapeutic Exercise 26    Treatment Type: Treatment  PT/PTA: PTA     PTA Visit Number: 1     Eliezer Rodgers PTA  2018

## 2018-01-25 LAB
ANION GAP SERPL CALC-SCNC: 7 MMOL/L
BASOPHILS # BLD AUTO: 0.01 K/UL
BASOPHILS NFR BLD: 0.1 %
BUN SERPL-MCNC: 39 MG/DL
CALCIUM SERPL-MCNC: 8.4 MG/DL
CHLORIDE SERPL-SCNC: 106 MMOL/L
CO2 SERPL-SCNC: 25 MMOL/L
CREAT CL/1.73 SQ M 12H UR+SERPL-ARVRAT: 5 ML/MIN
CREAT SERPL-MCNC: 4 MG/DL
CREAT SERPL-MCNC: 4 MG/DL
CREAT UR-MCNC: 29.3 MG/DL
CREATININE, URINE (MG/SPEC): 293 MG/SPEC
DIFFERENTIAL METHOD: ABNORMAL
EOSINOPHIL # BLD AUTO: 0 K/UL
EOSINOPHIL NFR BLD: 0 %
ERYTHROCYTE [DISTWIDTH] IN BLOOD BY AUTOMATED COUNT: 13.5 %
EST. GFR  (AFRICAN AMERICAN): 17 ML/MIN/1.73 M^2
EST. GFR  (NON AFRICAN AMERICAN): 15 ML/MIN/1.73 M^2
GLUCOSE SERPL-MCNC: 197 MG/DL
HCT VFR BLD AUTO: 25.3 %
HGB BLD-MCNC: 8.7 G/DL
LYMPHOCYTES # BLD AUTO: 0.3 K/UL
LYMPHOCYTES NFR BLD: 2 %
MCH RBC QN AUTO: 29.7 PG
MCHC RBC AUTO-ENTMCNC: 34.4 G/DL
MCV RBC AUTO: 86 FL
MONOCYTES # BLD AUTO: 0.9 K/UL
MONOCYTES NFR BLD: 5.8 %
NEUTROPHILS # BLD AUTO: 13.4 K/UL
NEUTROPHILS NFR BLD: 91.3 %
PLATELET # BLD AUTO: 174 K/UL
PMV BLD AUTO: 11.7 FL
POCT GLUCOSE: 170 MG/DL (ref 70–110)
POCT GLUCOSE: 180 MG/DL (ref 70–110)
POCT GLUCOSE: 246 MG/DL (ref 70–110)
POCT GLUCOSE: 253 MG/DL (ref 70–110)
POTASSIUM SERPL-SCNC: 3.4 MMOL/L
RBC # BLD AUTO: 2.93 M/UL
SODIUM SERPL-SCNC: 138 MMOL/L
URINE COLLECTION DURATION: 24 HR
URINE VOLUME: 1000 ML
WBC # BLD AUTO: 14.72 K/UL

## 2018-01-25 PROCEDURE — 63600175 PHARM REV CODE 636 W HCPCS: Performed by: INTERNAL MEDICINE

## 2018-01-25 PROCEDURE — 20000000 HC ICU ROOM

## 2018-01-25 PROCEDURE — C9113 INJ PANTOPRAZOLE SODIUM, VIA: HCPCS | Performed by: INTERNAL MEDICINE

## 2018-01-25 PROCEDURE — 90935 HEMODIALYSIS ONE EVALUATION: CPT

## 2018-01-25 PROCEDURE — 36415 COLL VENOUS BLD VENIPUNCTURE: CPT

## 2018-01-25 PROCEDURE — 25000003 PHARM REV CODE 250: Performed by: HOSPITALIST

## 2018-01-25 PROCEDURE — 82575 CREATININE CLEARANCE TEST: CPT

## 2018-01-25 PROCEDURE — 99232 SBSQ HOSP IP/OBS MODERATE 35: CPT | Mod: ,,, | Performed by: INTERNAL MEDICINE

## 2018-01-25 PROCEDURE — 94761 N-INVAS EAR/PLS OXIMETRY MLT: CPT

## 2018-01-25 PROCEDURE — 25000003 PHARM REV CODE 250: Performed by: NURSE PRACTITIONER

## 2018-01-25 PROCEDURE — 0JH63XZ INSERTION OF TUNNELED VASCULAR ACCESS DEVICE INTO CHEST SUBCUTANEOUS TISSUE AND FASCIA, PERCUTANEOUS APPROACH: ICD-10-PCS | Performed by: RADIOLOGY

## 2018-01-25 PROCEDURE — 02H633Z INSERTION OF INFUSION DEVICE INTO RIGHT ATRIUM, PERCUTANEOUS APPROACH: ICD-10-PCS | Performed by: RADIOLOGY

## 2018-01-25 PROCEDURE — 63600175 PHARM REV CODE 636 W HCPCS: Performed by: EMERGENCY MEDICINE

## 2018-01-25 PROCEDURE — 63600175 PHARM REV CODE 636 W HCPCS: Performed by: NURSE PRACTITIONER

## 2018-01-25 PROCEDURE — 25000003 PHARM REV CODE 250: Performed by: INTERNAL MEDICINE

## 2018-01-25 PROCEDURE — 63600175 PHARM REV CODE 636 W HCPCS: Performed by: RADIOLOGY

## 2018-01-25 PROCEDURE — 63600175 PHARM REV CODE 636 W HCPCS: Mod: JG | Performed by: INTERNAL MEDICINE

## 2018-01-25 PROCEDURE — 85025 COMPLETE CBC W/AUTO DIFF WBC: CPT

## 2018-01-25 PROCEDURE — 80048 BASIC METABOLIC PNL TOTAL CA: CPT

## 2018-01-25 RX ORDER — HEPARIN SODIUM 5000 [USP'U]/ML
INJECTION, SOLUTION INTRAVENOUS; SUBCUTANEOUS CODE/TRAUMA/SEDATION MEDICATION
Status: COMPLETED | OUTPATIENT
Start: 2018-01-25 | End: 2018-01-25

## 2018-01-25 RX ORDER — AMIODARONE HYDROCHLORIDE 200 MG/1
200 TABLET ORAL 2 TIMES DAILY
Status: DISCONTINUED | OUTPATIENT
Start: 2018-01-25 | End: 2018-01-26 | Stop reason: HOSPADM

## 2018-01-25 RX ORDER — SODIUM CHLORIDE 9 MG/ML
INJECTION, SOLUTION INTRAVENOUS
Status: DISCONTINUED | OUTPATIENT
Start: 2018-01-25 | End: 2018-01-26 | Stop reason: HOSPADM

## 2018-01-25 RX ORDER — FENTANYL CITRATE 50 UG/ML
INJECTION, SOLUTION INTRAMUSCULAR; INTRAVENOUS CODE/TRAUMA/SEDATION MEDICATION
Status: COMPLETED | OUTPATIENT
Start: 2018-01-25 | End: 2018-01-25

## 2018-01-25 RX ORDER — MIDAZOLAM HYDROCHLORIDE 1 MG/ML
INJECTION INTRAMUSCULAR; INTRAVENOUS CODE/TRAUMA/SEDATION MEDICATION
Status: COMPLETED | OUTPATIENT
Start: 2018-01-25 | End: 2018-01-25

## 2018-01-25 RX ADMIN — METHYLPREDNISOLONE SODIUM SUCCINATE 40 MG: 40 INJECTION, POWDER, FOR SOLUTION INTRAMUSCULAR; INTRAVENOUS at 09:01

## 2018-01-25 RX ADMIN — INSULIN ASPART 2 UNITS: 100 INJECTION, SOLUTION INTRAVENOUS; SUBCUTANEOUS at 06:01

## 2018-01-25 RX ADMIN — HEPARIN SODIUM 5000 UNITS: 5000 INJECTION, SOLUTION INTRAVENOUS; SUBCUTANEOUS at 02:01

## 2018-01-25 RX ADMIN — INSULIN ASPART 6 UNITS: 100 INJECTION, SOLUTION INTRAVENOUS; SUBCUTANEOUS at 12:01

## 2018-01-25 RX ADMIN — PANTOPRAZOLE SODIUM 40 MG: 40 INJECTION, POWDER, FOR SOLUTION INTRAVENOUS at 09:01

## 2018-01-25 RX ADMIN — METHYLPREDNISOLONE SODIUM SUCCINATE 20 MG: 40 INJECTION, POWDER, FOR SOLUTION INTRAMUSCULAR; INTRAVENOUS at 09:01

## 2018-01-25 RX ADMIN — DOCUSATE SODIUM 100 MG: 100 CAPSULE, LIQUID FILLED ORAL at 09:01

## 2018-01-25 RX ADMIN — PIPERACILLIN AND TAZOBACTAM 4.5 G: 4; .5 INJECTION, POWDER, LYOPHILIZED, FOR SOLUTION INTRAVENOUS; PARENTERAL at 02:01

## 2018-01-25 RX ADMIN — INSULIN DETEMIR 12 UNITS: 100 INJECTION, SOLUTION SUBCUTANEOUS at 09:01

## 2018-01-25 RX ADMIN — AMIODARONE HYDROCHLORIDE 200 MG: 200 TABLET ORAL at 09:01

## 2018-01-25 RX ADMIN — HEPARIN SODIUM 10000 UNITS: 5000 INJECTION, SOLUTION INTRAVENOUS; SUBCUTANEOUS at 08:01

## 2018-01-25 RX ADMIN — HEPARIN SODIUM 5000 UNITS: 5000 INJECTION, SOLUTION INTRAVENOUS; SUBCUTANEOUS at 08:01

## 2018-01-25 RX ADMIN — FENTANYL CITRATE 50 MCG: 50 INJECTION INTRAMUSCULAR; INTRAVENOUS at 08:01

## 2018-01-25 RX ADMIN — INSULIN ASPART 4 UNITS: 100 INJECTION, SOLUTION INTRAVENOUS; SUBCUTANEOUS at 04:01

## 2018-01-25 RX ADMIN — MIDAZOLAM HYDROCHLORIDE 1 MG: 1 INJECTION, SOLUTION INTRAMUSCULAR; INTRAVENOUS at 08:01

## 2018-01-25 RX ADMIN — HEPARIN SODIUM 5000 UNITS: 5000 INJECTION, SOLUTION INTRAVENOUS; SUBCUTANEOUS at 09:01

## 2018-01-25 RX ADMIN — ERYTHROPOIETIN 10000 UNITS: 10000 INJECTION, SOLUTION INTRAVENOUS; SUBCUTANEOUS at 09:01

## 2018-01-25 RX ADMIN — INSULIN ASPART 1 UNITS: 100 INJECTION, SOLUTION INTRAVENOUS; SUBCUTANEOUS at 09:01

## 2018-01-25 RX ADMIN — LEVOTHYROXINE SODIUM ANHYDROUS 100 MCG: 100 INJECTION, POWDER, LYOPHILIZED, FOR SOLUTION INTRAVENOUS at 09:01

## 2018-01-25 NOTE — PLAN OF CARE
Problem: Patient Care Overview  Goal: Plan of Care Review  Outcome: Ongoing (interventions implemented as appropriate)  Pt remains in ICU. Afebrile. VSS. NSR with frequent PACs at times on tele monitor. Right IJ TLC in place with amio infusing at 0.5mg/min. Right femoral sunitha dialysis catheter in place.  Alert and oriented, but delayed responses. Weakly follows commands. Some PO intake today. Remains on RA, no SOB. No pain of any kind reported today. No BMs this shift (none seen since admission-colace 100mg BID started today). Collins in place draining dark yellows, clear urine.  24 hr urine for creatinine clearance started at 1130.  IV abx changed to address osteomyelitis. SCDs/SQ heparin for VTE. Accuchecks ACHS-SSI PRN.  Turned q2hrs. No new skin breakdown/falls/injury this shift. POC reviewed with pt and pt's sister at bedside. Plan is for pt to go for tunneled HD access tomorrow.

## 2018-01-25 NOTE — PROGRESS NOTES
Renal ICU Progress Note    Date of Admission:  1/18/2018 11:28 AM      ROS:  Voices no new complaints, reportedly not eating much      Scheduled Meds:   amiodarone  200 mg Oral BID    docusate sodium  100 mg Oral BID    epoetin christos  10,000 Units Intravenous Once    heparin (porcine)  5,000 Units Subcutaneous Q8H    insulin detemir  12 Units Subcutaneous QHS    levothyroxine  100 mcg Intravenous Daily    methylPREDNISolone sodium succinate  20 mg Intravenous Q12H    pantoprazole  40 mg Intravenous BID    piperacillin-tazobactam (ZOSYN) IVPB  4.5 g Intravenous Q12H     Vital Signs Range (Last 24H):  Temp:  [97.4 °F (36.3 °C)-98.9 °F (37.2 °C)]   Pulse:  [52-86]   Resp:  [13-35]   BP: (145-196)/(66-98)   SpO2:  [94 %-100 %]     I & O (Last 24H):    Intake/Output Summary (Last 24 hours) at 01/25/18 1228  Last data filed at 01/25/18 0800   Gross per 24 hour   Intake           533.72 ml   Output              906 ml   Net          -372.28 ml           Physical Exam:  General appearance: well developed, well nourished, no distress  Lungs:  clear to auscultation bilaterally and normal respiratory effort  Heart: regular rate and rhythm  Abdomen: soft, non-tender non-distented; bowel sounds normal; no masses,  no organomegaly  Extremities: no cyanosis or edema, or clubbing    Laboratory:  CBC:     Recent Labs  Lab 01/25/18  0215   WBC 14.72*   RBC 2.93*   HGB 8.7*   HCT 25.3*      MCV 86   MCH 29.7   MCHC 34.4     CMP:     Recent Labs  Lab 01/21/18  0245  01/25/18  0215 01/25/18  1014   *  192*  < > 197*  --    CALCIUM 7.1*  7.1*  < > 8.4*  --    ALBUMIN 2.3*  --   --   --    PROT 4.4*  --   --   --    *  135*  < > 138  --    K 4.1  4.1  < > 3.4*  --    CO2 21*  21*  < > 25  --      102  < > 106  --    BUN 39*  39*  < > 39*  --    CREATININE 5.1*  5.1*  < > 4.0* 4.0*   ALKPHOS 78  --   --   --    ALT 10  --   --   --    AST 20  --   --   --    BILITOT  "0.9  --   --   --    < > = values in this interval not displayed.  Phosphorus         14.4 4.3 4.2     Phosphorus       Assessment:    Non oliguric SOURAV on top of CKD-4 requiring Hemodialysis   Suspect pte. Has ESRD and will not recover from this insult  S/p Acute respiratory failure extubated   A-Fib w/ intermittent RVR  S/p GI bleed remains NPO  Hx. DM type 2  Hypotension - resolved -  Suspected "sepsis" w/ SIRS criteria, so far all cultures negative  Anemia of acute loss  Resolving Metabolic (lactic) acidosis  Hypoalbuminemia    Plan:    - Dialysis Today and Q TTS for now  - Continue Cefepime with dose adjusted to current GFR  - Renal ADA diet  - Amiodarone p.o. Per Cardiology  - D/C Collins to gravity after 24h urine completed  - Taper down IV Medrol              Arvind Proctor  1/25/2018  "

## 2018-01-25 NOTE — PROGRESS NOTES
Progress Note    Admit Date: 1/18/2018   LOS: 7 days     SUBJECTIVE:     Follow-up For:  Acute Renal failure with severe metabolic acidosis    Scheduled Meds:   amiodarone  200 mg Oral BID    docusate sodium  100 mg Oral BID    epoetin christos  10,000 Units Intravenous Once    heparin (porcine)  5,000 Units Subcutaneous Q8H    insulin detemir  12 Units Subcutaneous QHS    levothyroxine  100 mcg Intravenous Daily    methylPREDNISolone sodium succinate  20 mg Intravenous Q12H    pantoprazole  40 mg Intravenous BID    piperacillin-tazobactam (ZOSYN) IVPB  4.5 g Intravenous Q12H     Continuous Infusions:   norepinephrine bitartrate-D5W Stopped (01/20/18 1000)    propofol Stopped (01/20/18 1100)     PRN Meds:sodium chloride, sodium chloride 0.9%, sodium chloride 0.9%, acetaminophen, [COMPLETED] calcium gluconate IVPB **AND** calcium gluconate IVPB, dextrose 50%, glucagon (human recombinant), heparin (porcine), insulin aspart, ondansetron, propofol, vancomycin (VANCOCIN) IVPB    Review of patient's allergies indicates:  No Known Allergies    Review of Systems  Constitutional: positive for fatigue  Eyes: negative  Ears, nose, mouth, throat, and face: negative  Respiratory: positive for cough  Cardiovascular: positive for palpitations and three - four beat runs of V-tach  Gastrointestinal: negative for abdominal pain, melena, nausea and vomiting  Genitourinary:negative, + bloody stool  Musculoskeletal:positive for myalgias  Behavioral/Psych: negative  Endocrine: negative    OBJECTIVE:     Vital Signs (Most Recent)  Temp: 97.8 °F (36.6 °C) (01/25/18 1200)  Pulse: 64 (01/25/18 1430)  Resp: 18 (01/25/18 1430)  BP: (!) 161/67 (01/25/18 1430)  SpO2: 96 % (01/25/18 1430)    Vital Signs Range (Last 24H):  Temp:  [97.4 °F (36.3 °C)-98.9 °F (37.2 °C)]   Pulse:  [52-77]   Resp:  [13-35]   BP: (135-196)/()   SpO2:  [83 %-100 %]     I & O (Last 24H):    Intake/Output Summary (Last 24 hours) at 01/25/18 1502  Last data  filed at 01/25/18 1400   Gross per 24 hour   Intake           383.62 ml   Output              938 ml   Net          -554.38 ml     Physical Exam:  General: no distress  HENT: Head:normocephalic, atraumatic. Ears:bilateral TM's and external ear canals normal. Nose: Nares normal. Septum midline. Mucosa normal. No drainage or sinus tenderness., no discharge. Throat: lips, mucosa, and tongue normal; teeth and gums normal and no throat erythema.  Eyes: conjunctivae/corneas clear. PERRL.   Neck: supple, symmetrical, trachea midline, no JVD and thyroid not enlarged, symmetric, no tenderness/mass/nodules  Lungs:  clear to auscultation bilaterally, normal respiratory effort and rhonchi bilaterally  Cardiovascular: Heart: irregularly irregular rhythm and tachy eryn episoded=s. Chest Wall: no tenderness. Extremities: no cyanosis or edema, or clubbing. Pulses: 2+ and symmetric.  Abdomen/Rectal: Abdomen: soft, non-tender non-distented; bowel sounds normal; no masses,  no organomegaly. Rectal: not examined  Skin: Skin color, texture, turgor normal. No rashes or lesions  Musculoskeletal:no clubbing, cyanosis  Neurologic: Mental status: Alert, oriented, thought content appropriate    Laboratory:  CBC:     Recent Labs  Lab 01/25/18 0215   WBC 14.72*   RBC 2.93*   HGB 8.7*   HCT 25.3*      MCV 86   MCH 29.7   MCHC 34.4     BMP:   Recent Labs  Lab 01/20/18  2153  01/25/18  0215 01/25/18  1014   GLU  --   < > 197*  --    NA  --   < > 138  --    K  --   < > 3.4*  --    CL  --   < > 106  --    CO2  --   < > 25  --    BUN  --   < > 39*  --    CREATININE  --   < > 4.0* 4.0*   CALCIUM  --   < > 8.4*  --    MG 2.0  --   --   --    < > = values in this interval not displayed.  CMP:     Recent Labs  Lab 01/21/18  0245  01/25/18  0215 01/25/18  1014   *  192*  < > 197*  --    CALCIUM 7.1*  7.1*  < > 8.4*  --    ALBUMIN 2.3*  --   --   --    PROT 4.4*  --   --   --    *  135*  < > 138  --    K 4.1  4.1  < > 3.4*  --     CO2 21*  21*  < > 25  --      102  < > 106  --    BUN 39*  39*  < > 39*  --    CREATININE 5.1*  5.1*  < > 4.0* 4.0*   ALKPHOS 78  --   --   --    ALT 10  --   --   --    AST 20  --   --   --    BILITOT 0.9  --   --   --    < > = values in this interval not displayed.  LFTs:     Recent Labs  Lab 01/21/18  0245   ALT 10   AST 20   ALKPHOS 78   BILITOT 0.9   PROT 4.4*   ALBUMIN 2.3*     Coagulation: No results for input(s): LABPROT, INR, APTT in the last 168 hours.  Cardiac markers:   Recent Labs  Lab 01/19/18  0307  01/21/18  1520   CPKMB 19.8*  --   --    TROPONINI 1.557*  < > 0.362*   < > = values in this interval not displayed.  ABGs:     Recent Labs  Lab 01/20/18  1202   PH 7.462*   PCO2 31.9*   PO2 112*   HCO3 22.8*   POCSATURATED 99   BE -1     Microbiology Results (last 7 days)     Procedure Component Value Units Date/Time    Blood culture [[id number]] Collected:  01/18/18 1315    Order Status:  Completed Specimen:  Blood from Peripheral, Antecubital, Left Updated:  01/23/18 1503     Blood Culture, Routine No growth after 5 days.    Blood culture [[id number]] Collected:  01/18/18 1330    Order Status:  Completed Specimen:  Blood from Peripheral, Antecubital, Left Updated:  01/23/18 1503     Blood Culture, Routine No growth after 5 days.    Urine culture [[id number]] Collected:  01/18/18 1429    Order Status:  Completed Specimen:  Urine from Urine, Catheterized Updated:  01/20/18 0840     Urine Culture, Routine No growth        Specimen (12h ago through future)    None        No results for input(s): COLORU, CLARITYU, SPECGRAV, PHUR, PROTEINUA, GLUCOSEU, BILIRUBINCON, BLOODU, WBCU, RBCU, BACTERIA, MUCUS, NITRITE, LEUKOCYTESUR, UROBILINOGEN, HYALINECASTS in the last 168 hours.      Diagnostic Results:  Labs: Reviewed  ECG: Reviewed  X-Ray: Reviewed  CT: Reviewed  reviewed    ASSESSMENT/PLAN:     .1. Septic Shock -resolved  2. Acute renal failure most likely s/t infection or Dehydration -improving  slowly  3. Severe Metabolic acidosis - s/t renal failure -improving off HD  4. New on set A-Fib - restart Amiodarone monitor rate -give metoprolol 5 IV for rate control.  5. Troponin elevated - will trend out and consult cards maybe s/t renal impairment  6. Essential HTN - stable  7. Hyperkalemia - s/t acute renal failure - resolved  8. Normocytic Anemia - will investigate -s/p transfusion - stable  9. Hypothermia - s/t Shock - resolved  10. H/O T2DM - continue s/s insulin steroid being used  11. Leukocytosis -blood cultures negative so far   12. Hypothyroidism by history. -monitor  13. Throat discomfort with swallowing which is mostly s/t trauma with intubation- improved    14. Right great toe  osteomyelitis - start Vanc and zosyn pharm to renal dose.     Plan:transfer to [facility] in am

## 2018-01-25 NOTE — ASSESSMENT & PLAN NOTE
Appears to be PAF, currently back in NSR.  Echo with good EF. Change amiodarone to po. On SC heparin. Switch to eliquis prior to d/c

## 2018-01-25 NOTE — CONSULTS
Radiology Post-Procedure Note    Pre Op Diagnosis: ESRD  Post Op Diagnosis: Same    Procedure: Tunneled HD catheter placement    Procedure performed by: Santosh Lai MD    Written Informed Consent Obtained: Yes  Specimen Removed: NO  Estimated Blood Loss: Minimal    Findings:   Successful placement of Tunneled HD catheter.      Catheter ready to use    Successful removal of Arnaldo catheter    Patient tolerated procedure well.    Santosh Lai MD  Staff Interventional Radiologist  Department of Radiology

## 2018-01-25 NOTE — PROGRESS NOTES
[facility]  Cardiology  Progress Note    Patient Name: ERNA Jesus  MRN: [id number]  Admission Date: 1/18/2018  Hospital Length of Stay: 7 days  Code Status: Full Code   Attending Physician: Giancarlo Cm MD   Primary Care Physician: No primary care provider on file.  Expected Discharge Date:   Principal Problem:Metabolic acidosis    Subjective:     Hospital Course:   1/22 Bradycardic yesterday. Rates improved now. Still having episodes of A-fib RVR  1/24 NSR with frequent PACs  Denies CP or SOB    Echo 1/19/18    1 - Normal left ventricular systolic function (EF 60-65%).     2 - Concentric hypertrophy.     3 - Trivial mitral regurgitation.     4 - Trivial to mild tricuspid regurgitation.     Interval History:     Review of Systems   Constitution: Negative for decreased appetite.   HENT: Negative for ear discharge.    Eyes: Negative for blurred vision.   Endocrine: Negative for polyphagia.   Skin: Negative for nail changes.   Neurological: Negative for aphonia.   Psychiatric/Behavioral: Negative for hallucinations.     Objective:     Vital Signs (Most Recent):  Temp: 97.4 °F (36.3 °C) (01/25/18 0315)  Pulse: (!) 53 (01/25/18 0717)  Resp: 17 (01/25/18 0717)  BP: (!) 152/71 (01/25/18 0600)  SpO2: 98 % (01/25/18 0717) Vital Signs (24h Range):  Temp:  [97.4 °F (36.3 °C)-98.9 °F (37.2 °C)] 97.4 °F (36.3 °C)  Pulse:  [52-86] 53  Resp:  [13-35] 17  SpO2:  [94 %-99 %] 98 %  BP: (145-182)/(65-98) 152/71     Weight: 80.4 kg (177 lb 4 oz)  Body mass index is 26.18 kg/m².     SpO2: 98 %  O2 Device (Oxygen Therapy): room air      Intake/Output Summary (Last 24 hours) at 01/25/18 0832  Last data filed at 01/25/18 0600   Gross per 24 hour   Intake              634 ml   Output             1021 ml   Net             -387 ml       Lines/Drains/Airways     Central Venous Catheter Line                 Hemodialysis Catheter 01/18/18 1707 right femoral 6 days         Percutaneous Central Line Insertion/Assessment -  triple lumen  01/18/18 1500 right internal jugular 6 days          Drain                 Urethral Catheter 01/18/18 1200 6 days                Physical Exam   Constitutional: He is oriented to person, place, and time. He appears well-developed and well-nourished.   HENT:   Head: Normocephalic and atraumatic.   Eyes: Conjunctivae are normal. Pupils are equal, round, and reactive to light.   Neck: Normal range of motion. Neck supple.   Cardiovascular: Normal rate, normal heart sounds and intact distal pulses.    Pulmonary/Chest: Effort normal and breath sounds normal.   Abdominal: Soft. Bowel sounds are normal.   Musculoskeletal: Normal range of motion.   Neurological: He is alert and oriented to person, place, and time.   Skin: Skin is warm and dry.       Significant Labs: All pertinent lab results from the last 24 hours have been reviewed.    Significant Imaging: Echocardiogram:   2D echo with color flow doppler:   Results for orders placed or performed during the hospital encounter of 01/18/18   2D echo with color flow doppler   Result Value Ref Range    EF 60 55 - 65    Mitral Valve Regurgitation TRIVIAL     Diastolic Dysfunction No     Est. PA Systolic Pressure 31.09     Tricuspid Valve Regurgitation TRIVIAL TO MILD      Assessment and Plan:     Brief HPI:     * Metabolic acidosis             Acute respiratory failure             Elevated troponin    Suspect related to demand ischemia and ARF. Ischemic evaluation when he stabilizes        Encephalopathy, metabolic             Acute renal failure             Paroxysmal tachycardia    Appears to be PAF, currently back in NSR.  Echo with good EF. Change amiodarone to po. On SC heparin. Switch to eliquis prior to d/c        Lactic acid acidosis                 VTE Risk Mitigation         Ordered     heparin (porcine) injection 5,000 Units  Every 8 hours     Route:  Subcutaneous        01/18/18 1551     Place sequential compression device  Until discontinued       01/18/18 2016     heparin (porcine) injection 5,000 Units  As needed (PRN)     Route:  Intra-Catheter        01/18/18 1757     Medium Risk of VTE  Once      01/18/18 1551          Arley Rajput MD  Cardiology  [facility]

## 2018-01-25 NOTE — PT/OT/SLP PROGRESS
Occupational Therapy      Patient Name:  ERNA Jesus   MRN:  [id number]    Patient not seen today secondary to Unavailable (Comment) (off the floor for permacath placement). Will follow-up as able.    DERRICK Mike MS  1/25/2018

## 2018-01-25 NOTE — SUBJECTIVE & OBJECTIVE
Interval History:     Review of Systems   Constitution: Negative for decreased appetite.   HENT: Negative for ear discharge.    Eyes: Negative for blurred vision.   Endocrine: Negative for polyphagia.   Skin: Negative for nail changes.   Neurological: Negative for aphonia.   Psychiatric/Behavioral: Negative for hallucinations.     Objective:     Vital Signs (Most Recent):  Temp: 97.4 °F (36.3 °C) (01/25/18 0315)  Pulse: (!) 53 (01/25/18 0717)  Resp: 17 (01/25/18 0717)  BP: (!) 152/71 (01/25/18 0600)  SpO2: 98 % (01/25/18 0717) Vital Signs (24h Range):  Temp:  [97.4 °F (36.3 °C)-98.9 °F (37.2 °C)] 97.4 °F (36.3 °C)  Pulse:  [52-86] 53  Resp:  [13-35] 17  SpO2:  [94 %-99 %] 98 %  BP: (145-182)/(65-98) 152/71     Weight: 80.4 kg (177 lb 4 oz)  Body mass index is 26.18 kg/m².     SpO2: 98 %  O2 Device (Oxygen Therapy): room air      Intake/Output Summary (Last 24 hours) at 01/25/18 0832  Last data filed at 01/25/18 0600   Gross per 24 hour   Intake              634 ml   Output             1021 ml   Net             -387 ml       Lines/Drains/Airways     Central Venous Catheter Line                 Hemodialysis Catheter 01/18/18 1707 right femoral 6 days         Percutaneous Central Line Insertion/Assessment - triple lumen  01/18/18 1500 right internal jugular 6 days          Drain                 Urethral Catheter 01/18/18 1200 6 days                Physical Exam   Constitutional: He is oriented to person, place, and time. He appears well-developed and well-nourished.   HENT:   Head: Normocephalic and atraumatic.   Eyes: Conjunctivae are normal. Pupils are equal, round, and reactive to light.   Neck: Normal range of motion. Neck supple.   Cardiovascular: Normal rate, normal heart sounds and intact distal pulses.    Pulmonary/Chest: Effort normal and breath sounds normal.   Abdominal: Soft. Bowel sounds are normal.   Musculoskeletal: Normal range of motion.   Neurological: He is alert and oriented to person, place, and  time.   Skin: Skin is warm and dry.       Significant Labs: All pertinent lab results from the last 24 hours have been reviewed.    Significant Imaging: Echocardiogram:   2D echo with color flow doppler:   Results for orders placed or performed during the hospital encounter of 01/18/18   2D echo with color flow doppler   Result Value Ref Range    EF 60 55 - 65    Mitral Valve Regurgitation TRIVIAL     Diastolic Dysfunction No     Est. PA Systolic Pressure 31.09     Tricuspid Valve Regurgitation TRIVIAL TO MILD

## 2018-01-25 NOTE — SEDATION DOCUMENTATION
Report given to Alecia Cardenas. Pt tolerated procedure well. VSS. No s/s of distress noted. Pressure held on right groin for 10 minutes. No hematoma or bleeding noted.

## 2018-01-25 NOTE — INTERVAL H&P NOTE
The patient has been examined and the H&P has been reviewed:    Changes since H&P noted in progress notes since admission. No contraindication to planned procedure        Active Hospital Problems    Diagnosis  POA    *Metabolic acidosis [E87.2]  Yes    Sepsis [A41.9]  Yes    Acute respiratory failure [J96.00]  Yes    On mechanically assisted ventilation [Z99.11]  Not Applicable    Lactic acid acidosis [E87.2]  Yes    Hyperkalemia [E87.5]  Yes    Hypothermia [T68.XXXA]  Yes    Paroxysmal tachycardia [I47.9]  Yes    Acute renal failure [N17.9]  Yes    Muscle wasting [M62.50]  Yes    Neutrophilic leukocytosis [D72.9]  Yes    Uremia [N19]  Yes    Encephalopathy, metabolic [G93.41]  Yes    Hyperphosphatemia [E83.39]  Yes    Elevated troponin [R74.8]  Yes      Resolved Hospital Problems    Diagnosis Date Resolved POA   No resolved problems to display.

## 2018-01-25 NOTE — PROGRESS NOTES
Discharge planning: communication thru Ellyn with [facility] that Dr Cm wants patient to have eval for LTAC. MARY ALICE reviewed chart, discussed with patient and his sister. NP Cecile Freire mentions LTAC referral in her progress note of 1/24 without placing consult.  Patient is okay with referral to [facility]. As there is no consult yet, discussed again with Ellyn at [facility]. Ellyn will come at 8:45 in am to meet with patient and sister to provide LTAC information. As patient gave verbal consent, MARY ALICE will sent information via [facility] so that [facility] can evaluate for needs.

## 2018-01-25 NOTE — PLAN OF CARE
Problem: Patient Care Overview  Goal: Plan of Care Review  Outcome: Ongoing (interventions implemented as appropriate)  Pt in ICU continued on amio gtt at 0.5 mg/min; NSR on the monitor. SBP remains elevated in the 150s-160s. Pt AAO; slow responses. Collins CDI; Cr clearance lab continued to be collected this shift. No BM. NPO after midnight; heparin held per MD order. Afebrile. No new hospital acquired injuries this shift. Pt to IR in the am for dialysis tunnel cath placement.

## 2018-01-25 NOTE — SEDATION DOCUMENTATION
Anesthesia Plan-Radiology    Anesthesia Plan: Moderate Sedation     Pre-Anesthesia Assessment: Calm, Awake    ASA Status: Mild Disease    Airway: Dentition, Mouth Opening, Visualization of uvula and Neck range of motion     PREINDUCTION ASSESSMENT: UNCHANGED    Procedure: tunneled HD    Pre-Procedure Diagnosis: esrd    Intra-Procedure Meds:Versed and Fentanyl

## 2018-01-26 VITALS
HEART RATE: 86 BPM | BODY MASS INDEX: 26.25 KG/M2 | HEIGHT: 69 IN | SYSTOLIC BLOOD PRESSURE: 192 MMHG | RESPIRATION RATE: 30 BRPM | WEIGHT: 177.25 LBS | TEMPERATURE: 98 F | DIASTOLIC BLOOD PRESSURE: 86 MMHG | OXYGEN SATURATION: 97 %

## 2018-01-26 LAB
ANION GAP SERPL CALC-SCNC: 9 MMOL/L
BASOPHILS # BLD AUTO: 0.01 K/UL
BASOPHILS NFR BLD: 0.1 %
BUN SERPL-MCNC: 25 MG/DL
CALCIUM SERPL-MCNC: 8.3 MG/DL
CHLORIDE SERPL-SCNC: 104 MMOL/L
CO2 SERPL-SCNC: 27 MMOL/L
CREAT SERPL-MCNC: 2.8 MG/DL
DIFFERENTIAL METHOD: ABNORMAL
EOSINOPHIL # BLD AUTO: 0 K/UL
EOSINOPHIL NFR BLD: 0.1 %
ERYTHROCYTE [DISTWIDTH] IN BLOOD BY AUTOMATED COUNT: 14 %
EST. GFR  (AFRICAN AMERICAN): 26 ML/MIN/1.73 M^2
EST. GFR  (NON AFRICAN AMERICAN): 22 ML/MIN/1.73 M^2
GLUCOSE SERPL-MCNC: 144 MG/DL
HCT VFR BLD AUTO: 26.9 %
HGB BLD-MCNC: 9.2 G/DL
LYMPHOCYTES # BLD AUTO: 0.7 K/UL
LYMPHOCYTES NFR BLD: 5.9 %
MCH RBC QN AUTO: 29.4 PG
MCHC RBC AUTO-ENTMCNC: 34.2 G/DL
MCV RBC AUTO: 86 FL
MONOCYTES # BLD AUTO: 0.7 K/UL
MONOCYTES NFR BLD: 5.6 %
NEUTROPHILS # BLD AUTO: 11 K/UL
NEUTROPHILS NFR BLD: 88.3 %
PLATELET # BLD AUTO: 206 K/UL
PMV BLD AUTO: 11.4 FL
POCT GLUCOSE: 111 MG/DL (ref 70–110)
POCT GLUCOSE: 190 MG/DL (ref 70–110)
POCT GLUCOSE: 261 MG/DL (ref 70–110)
POTASSIUM SERPL-SCNC: 3.6 MMOL/L
RBC # BLD AUTO: 3.13 M/UL
SODIUM SERPL-SCNC: 140 MMOL/L
TB INDURATION 48 - 72 HR READ: 0 MM
WBC # BLD AUTO: 12.46 K/UL

## 2018-01-26 PROCEDURE — 25000003 PHARM REV CODE 250: Performed by: INTERNAL MEDICINE

## 2018-01-26 PROCEDURE — 25000003 PHARM REV CODE 250: Performed by: HOSPITALIST

## 2018-01-26 PROCEDURE — 63600175 PHARM REV CODE 636 W HCPCS: Performed by: INTERNAL MEDICINE

## 2018-01-26 PROCEDURE — 36415 COLL VENOUS BLD VENIPUNCTURE: CPT

## 2018-01-26 PROCEDURE — 25000003 PHARM REV CODE 250: Performed by: NURSE PRACTITIONER

## 2018-01-26 PROCEDURE — 85025 COMPLETE CBC W/AUTO DIFF WBC: CPT

## 2018-01-26 PROCEDURE — 63600175 PHARM REV CODE 636 W HCPCS: Performed by: NURSE PRACTITIONER

## 2018-01-26 PROCEDURE — 63600175 PHARM REV CODE 636 W HCPCS: Performed by: EMERGENCY MEDICINE

## 2018-01-26 PROCEDURE — 99232 SBSQ HOSP IP/OBS MODERATE 35: CPT | Mod: ,,, | Performed by: INTERNAL MEDICINE

## 2018-01-26 PROCEDURE — C9113 INJ PANTOPRAZOLE SODIUM, VIA: HCPCS | Performed by: INTERNAL MEDICINE

## 2018-01-26 PROCEDURE — 63600175 PHARM REV CODE 636 W HCPCS: Performed by: FAMILY MEDICINE

## 2018-01-26 PROCEDURE — 80048 BASIC METABOLIC PNL TOTAL CA: CPT

## 2018-01-26 RX ORDER — METOPROLOL TARTRATE 25 MG/1
25 TABLET, FILM COATED ORAL 2 TIMES DAILY
Status: DISCONTINUED | OUTPATIENT
Start: 2018-01-26 | End: 2018-01-26 | Stop reason: HOSPADM

## 2018-01-26 RX ORDER — HYDRALAZINE HYDROCHLORIDE 20 MG/ML
10 INJECTION INTRAMUSCULAR; INTRAVENOUS ONCE
Status: COMPLETED | OUTPATIENT
Start: 2018-01-26 | End: 2018-01-26

## 2018-01-26 RX ORDER — METOPROLOL TARTRATE 25 MG/1
25 TABLET, FILM COATED ORAL 2 TIMES DAILY
Qty: 60 TABLET | Refills: 11 | OUTPATIENT
Start: 2018-01-26 | End: 2019-01-26

## 2018-01-26 RX ORDER — LEVOTHYROXINE SODIUM 100 UG/1
100 TABLET ORAL
Qty: 30 TABLET | Refills: 11 | OUTPATIENT
Start: 2018-01-27 | End: 2019-01-27

## 2018-01-26 RX ORDER — HEPARIN SODIUM 5000 [USP'U]/ML
5000 INJECTION, SOLUTION INTRAVENOUS; SUBCUTANEOUS
Qty: 1 ML | Refills: 1 | OUTPATIENT
Start: 2018-01-26

## 2018-01-26 RX ORDER — LEVOTHYROXINE SODIUM 100 UG/1
100 TABLET ORAL
Status: DISCONTINUED | OUTPATIENT
Start: 2018-01-27 | End: 2018-01-26 | Stop reason: HOSPADM

## 2018-01-26 RX ORDER — SODIUM CHLORIDE 9 MG/ML
100 INJECTION, SOLUTION INTRAVENOUS
Qty: 100 ML | Refills: 1 | OUTPATIENT
Start: 2018-01-26

## 2018-01-26 RX ORDER — AMIODARONE HYDROCHLORIDE 200 MG/1
200 TABLET ORAL 2 TIMES DAILY
Qty: 60 TABLET | Refills: 11 | OUTPATIENT
Start: 2018-01-26 | End: 2019-01-26

## 2018-01-26 RX ORDER — ACETAMINOPHEN 650 MG/20.3ML
650 LIQUID ORAL EVERY 4 HOURS PRN
COMMUNITY
Start: 2018-01-26

## 2018-01-26 RX ADMIN — AMIODARONE HYDROCHLORIDE 200 MG: 200 TABLET ORAL at 08:01

## 2018-01-26 RX ADMIN — INSULIN ASPART 6 UNITS: 100 INJECTION, SOLUTION INTRAVENOUS; SUBCUTANEOUS at 05:01

## 2018-01-26 RX ADMIN — DOCUSATE SODIUM 100 MG: 100 CAPSULE, LIQUID FILLED ORAL at 08:01

## 2018-01-26 RX ADMIN — APIXABAN 2.5 MG: 2.5 TABLET, FILM COATED ORAL at 02:01

## 2018-01-26 RX ADMIN — LEVOTHYROXINE SODIUM ANHYDROUS 100 MCG: 100 INJECTION, POWDER, LYOPHILIZED, FOR SOLUTION INTRAVENOUS at 08:01

## 2018-01-26 RX ADMIN — PIPERACILLIN AND TAZOBACTAM 4.5 G: 4; .5 INJECTION, POWDER, LYOPHILIZED, FOR SOLUTION INTRAVENOUS; PARENTERAL at 02:01

## 2018-01-26 RX ADMIN — HYDRALAZINE HYDROCHLORIDE 10 MG: 20 INJECTION INTRAMUSCULAR; INTRAVENOUS at 05:01

## 2018-01-26 RX ADMIN — VANCOMYCIN HYDROCHLORIDE 1000 MG: 1 INJECTION, POWDER, LYOPHILIZED, FOR SOLUTION INTRAVENOUS at 02:01

## 2018-01-26 RX ADMIN — HEPARIN SODIUM 5000 UNITS: 5000 INJECTION, SOLUTION INTRAVENOUS; SUBCUTANEOUS at 05:01

## 2018-01-26 RX ADMIN — PANTOPRAZOLE SODIUM 40 MG: 40 INJECTION, POWDER, FOR SOLUTION INTRAVENOUS at 08:01

## 2018-01-26 RX ADMIN — METHYLPREDNISOLONE SODIUM SUCCINATE 20 MG: 40 INJECTION, POWDER, FOR SOLUTION INTRAMUSCULAR; INTRAVENOUS at 08:01

## 2018-01-26 RX ADMIN — HYDRALAZINE HYDROCHLORIDE 10 MG: 20 INJECTION INTRAMUSCULAR; INTRAVENOUS at 04:01

## 2018-01-26 RX ADMIN — METOPROLOL TARTRATE 25 MG: 25 TABLET ORAL at 02:01

## 2018-01-26 NOTE — DISCHARGE SUMMARY
[facility]  Discharge Note      Admit Date: 1/18/2018    Discharge Date and Time: No discharge date for patient encounter.     Attending Physician: Giancarlo Cm MD     Discharge Provider: Cecile Freire    Diagnoses:  Active Hospital Problems    Diagnosis  POA    *Sepsis [A41.9]  Yes    Acute respiratory failure [J96.00]  Yes    On mechanically assisted ventilation [Z99.11]  Not Applicable    Metabolic acidosis [E87.2]  Yes    Lactic acid acidosis [E87.2]  Yes    Hyperkalemia [E87.5]  Yes    Hypothermia [T68.XXXA]  Yes    Paroxysmal tachycardia [I47.9]  Yes    Acute renal failure [N17.9]  Yes    Muscle wasting [M62.50]  Yes    Neutrophilic leukocytosis [D72.9]  Yes    Uremia [N19]  Yes    Encephalopathy, metabolic [G93.41]  Yes    Hyperphosphatemia [E83.39]  Yes    Elevated troponin [R74.8]  Yes      Resolved Hospital Problems    Diagnosis Date Resolved POA   No resolved problems to display.       Discharged Condition: stable    Hospital Course: pt was admitted in acute renal failure and severe metabolic acidosis requiring intubation. Pulmonary was consulted for vent management.He was in need of emergent HD which was performed by nephrology.  He was later found to have Osteomyelitis of his great toe . ID was consulted and appropriate antibiotic were finalized the patient was sent to LTAC for further care management and rehab.  Also, this hospital stay was complicated by the pt having new onset afib with RVR, Cards was consulted he was on a amiodarone gtt and subsequently transitioned to po amiodarone.  He also required a blood transfuse.  GI was consulted and no active bleeding was identify    Final Diagnoses: Same as principal problem. Septic shock with acute renal failure and severe metabolic acidosis    Disposition: Long Term Care    Follow up/Patient Instructions:    Medications:  Reconciled Home Medications:   Discharge Medication List as of 1/26/2018  5:11 PM      START taking  these medications    Details   0.9 % SODIUM CHLORIDE (SODIUM CHLORIDE 0.9%) 0.9 % solution Inject 100 mLs into the vein as needed (in dialysis)., Starting Fri 1/26/2018, Print      acetaminophen (TYLENOL) 650 mg/20.3 mL Soln Take 20.3 mLs (650 mg total) by mouth every 4 (four) hours as needed., Starting Fri 1/26/2018, OTC      amiodarone (PACERONE) 200 MG Tab Take 1 tablet (200 mg total) by mouth 2 (two) times daily., Starting Fri 1/26/2018, Until Sat 1/26/2019, Print      apixaban 2.5 mg Tab Take 1 tablet (2.5 mg total) by mouth 2 (two) times daily., Starting Fri 1/26/2018, Print      HEPARIN SODIUM,PORCINE (HEPARIN, PORCINE,) 5,000 unit/mL injection 1 mL (5,000 Units total) by Intra-Catheter route as needed (heparin 5000 units X 2 vials for CVC line care post each dialysis)., Starting Fri 1/26/2018, Print      insulin detemir (LEVEMIR FLEXTOUCH) 100 unit/mL (3 mL) SubQ InPn pen Inject 12 Units into the skin every evening., Starting Fri 1/26/2018, Until Sat 1/26/2019, Print      levothyroxine (SYNTHROID) 100 MCG tablet Take 1 tablet (100 mcg total) by mouth before breakfast., Starting Sat 1/27/2018, Until Sun 1/27/2019, Print      methylPREDNISolone sodium succinate (SOLU-MEDROL) 40 mg/mL SolR Inject 20 mg into the vein once daily., Starting Sat 1/27/2018, Print      metoprolol tartrate (LOPRESSOR) 25 MG tablet Take 1 tablet (25 mg total) by mouth 2 (two) times daily., Starting Fri 1/26/2018, Until Sat 1/26/2019, Print      VANCOMYCIN HCL (VANCOMYCIN 1 G/250 ML NS, READY TO MIX SYSTEM,) Inject 250 mLs (1,000 mg total) into the vein every Tues, Thurs, Sat., Starting Sat 1/27/2018, Print           No discharge procedures on file.      Discharge Procedure Orders (must include Diet, Follow-up, Activity):  No discharge procedures on file.

## 2018-01-26 NOTE — PLAN OF CARE
01/26/18 1600   Transport Information   Transport Diagnosis sepsis,    Transportation Date 01/26/18   Transported From [facility]   Transported To [facility]   Supporting Clinical Information   Unable to sit or travel in a seated position because Bed Confined (unable to ambulate/sit)   Select all that would support previous selection ECG monitoring required en route   also AFIB dx reported to ambulance when called at 4:30 (Ubaldo Barrett, to be here within the hour)

## 2018-01-26 NOTE — PLAN OF CARE
[facility]    FACILITY TRANSFER ORDERS      Patient Name: ERNA Jesus  YOB: 1950    PCP: Dr. Giancarlo Cm   PCP Address: No primary physician on file.  PCP Phone Number: None  PCP Fax: None    Encounter Date: 01/26/2018    [facility]    Vital Signs:  Routine    Diagnoses:   Active Hospital Problems    Diagnosis  POA    *Sepsis [A41.9]  Yes    Acute respiratory failure [J96.00]  Yes    On mechanically assisted ventilation [Z99.11]  Not Applicable    Metabolic acidosis [E87.2]  Yes    Lactic acid acidosis [E87.2]  Yes    Hyperkalemia [E87.5]  Yes    Hypothermia [T68.XXXA]  Yes    Paroxysmal tachycardia [I47.9]  Yes    Acute renal failure [N17.9]  Yes    Muscle wasting [M62.50]  Yes    Neutrophilic leukocytosis [D72.9]  Yes    Uremia [N19]  Yes    Encephalopathy, metabolic [G93.41]  Yes    Hyperphosphatemia [E83.39]  Yes    Elevated troponin [R74.8]  Yes      Resolved Hospital Problems    Diagnosis Date Resolved POA   No resolved problems to display.       Allergies:Review of patient's allergies indicates:  No Known Allergies    Diet: cardiac diet, diabetic diet: 1800 calorie and renal diet    Activities: Bed rest with bathroom privileges    Nursing:Fall precautions, Vitals Q 2 hours, Tele monitoring     Labs: CBC, CMP, ESR, CRP and INR Daily for 10 days     CONSULTS:    Physical Therapy to evaluate and treat. , Occupational Therapy to evaluate and treat., Speech Therapy to evaluate and treat for Language. and  to evaluate for community resources/long-range planning.    MISCELLANEOUS CARE:  Collins Care: Empty Collins bag every shift. Change Collins every month., Routine Skin for Bedridden Patients: Apply moisture barrier cream to all skin folds and wet areas in perineal area daily and after baths and all bowel movements., Diabetes Care:   SN to perform and educate Diabetic management with blood glucose monitoring:, Fingerstick blood sugar every 6  hours if patient is unable to eat and Report CBG < 60 or > 350 to physician. and wound care to right great toe    WOUND CARE ORDERS   ERNA Jesus   Home Medication Instructions VINAYAK:[id number]    Printed on:01/26/18 9453   Medication Information                      0.9 % SODIUM CHLORIDE (SODIUM CHLORIDE 0.9%) 0.9 % solution  Inject 100 mLs into the vein as needed (in dialysis).             acetaminophen (TYLENOL) 650 mg/20.3 mL Soln  Take 20.3 mLs (650 mg total) by mouth every 4 (four) hours as needed.             amiodarone (PACERONE) 200 MG Tab  Take 1 tablet (200 mg total) by mouth 2 (two) times daily.             apixaban 2.5 mg Tab  Take 1 tablet (2.5 mg total) by mouth 2 (two) times daily.             HEPARIN SODIUM,PORCINE (HEPARIN, PORCINE,) 5,000 unit/mL injection  1 mL (5,000 Units total) by Intra-Catheter route as needed (heparin 5000 units X 2 vials for CVC line care post each dialysis).             insulin detemir (LEVEMIR FLEXTOUCH) 100 unit/mL (3 mL) SubQ InPn pen  Inject 12 Units into the skin every evening.             levothyroxine (SYNTHROID) 100 MCG tablet  Take 1 tablet (100 mcg total) by mouth before breakfast.             methylPREDNISolone sodium succinate (SOLU-MEDROL) 40 mg/mL SolR  Inject 20 mg into the vein once daily.             metoprolol tartrate (LOPRESSOR) 25 MG tablet  Take 1 tablet (25 mg total) by mouth 2 (two) times daily.             VANCOMYCIN HCL (VANCOMYCIN 1 G/250 ML NS, READY TO MIX SYSTEM,)  Inject 250 mLs (1,000 mg total) into the vein every Tues, Thurs, Sat.               Yes: right great toe  There are no discharge medications for this patient.     Medications: Review discharge medications with patient and family and provide education.    there are no discharge medications for this patient.     here are no discharge medications for this patient.     There are no discharge medications for this patient.           _________________________________  Cecile Freire  NP  01/26/2018

## 2018-01-26 NOTE — PLAN OF CARE
Problem: Patient Care Overview  Goal: Plan of Care Review  Outcome: Ongoing (interventions implemented as appropriate)  Patient in bed AAOx4. No acute distress. NSR on cardiac monitor. Afebrile. Hypertensive. Patient required 1x dose of Hydralazine for SBP >180. RIJ TLC intact and dressing changed. Saline lock. Scd's in place. Room air. 2000 diabetic/renal diet ordered. Collins catheter intact and draining to gravity. Right subclavian tunnel cath intact. Stage 2 pressure ulcer to sacrum, and would to right great toe. Dialysis treatment completed during shift. Removed 2L during treatment. Plan of care is to transfer to LTAC. Patient and patient's sister aware.

## 2018-01-26 NOTE — PROGRESS NOTES
"  [facility]  Adult Nutrition  Consult Note    SUMMARY     Recommendations    Recommendation/Intervention:   1. Add Dental Soft restriction to diet order   2. Rec boost glucose chocolate tid   3. Aid with BM (last noted on 1/19)  4. Provided Renal diet education   5. RD to monitor    Goals: Meet >85% EEN  Nutrition Goal Status: new  Communication of RD Recs: reviewed with RN    Continuum of Care Plan    Referral to Outpatient Services:  (D/C planning: Renal/2000 ADA/Dental Soft/supplements)    Reason for Assessment    Reason for Assessment: length of stay  Diagnosis:  (Met acidosis)  Relevent Medical History: DM   Interdisciplinary Rounds: attended     General Information Comments: Met with patient and reviewed Renal diet, provided handouts and f/u resources. Patient reports decreased appetite and difficulty with chewing meats. Patient would benefit from a supplement and dental soft diet texture. Eating 25% of meals. + New HD.     Nutrition Prescription Ordered    Current Diet Order: Renal/2000 ADA      Evaluation of Received Nutrients/Fluid Intake     Energy Calories Required: not meeting needs  Protein Required: not meeting needs      I/O: 1076/4560       Fluid Required:  (per MD)  Comments: LBM: 1/19  Tolerance:  (difficulty chewing meats)    % Intake of Estimated Energy Needs: 25%  % Meal Intake: 25%    Nutrition Risk Screen     Nutrition Risk Screen: no indicators present    Nutrition/Diet History     Food Preferences: Denies cultural, Tenriism, ethnic food preferences.     Labs/Tests/Procedures/Meds     Pertinent Labs Reviewed: reviewed   Pertinent Medications Reviewed: reviewed     Physical Findings    Overall Physical Appearance: nourished   Oral/Mouth Cavity: decay/carries  Skin: intact    Anthropometrics    Temp: 98.3 °F (36.8 °C)     Height: 5' 9" (175.3 cm)  Weight Method: Bed Scale  Weight: 80.4 kg (177 lb 4 oz)  Ideal Body Weight (IBW), Male: 160 lb     % Ideal Body Weight, Male " (lb): 110.78 lb     BMI (Calculated): 26.2  BMI Grade: 25 - 29.9 - overweight     Estimated/Assessed Needs    Weight Used For Calorie Calculations: 80.4 kg (177 lb 4 oz)      Energy Calorie Requirements (kcal): 7706-9408 kcal  Energy Need Method: [place]-St Jeor     RMR ([place]-St. Jeor Equation): 1574.38      Weight Used For Protein Calculations: 75 kg (165 lb 5.5 oz) (SBW )  Protein Requirements: 90g     Fluid Need Method: RDA Method      RDA Method (mL): 2000       CHO Requirement: 250g     Assessment and Plan    Nutrition Dx: Nutrition related knowledge defecit r/t ESRD as evidenced by new HD  Nutrition Dx Status: New      Monitor and Evaluation    Food and Nutrient Intake: energy intake, food and beverage intake  Food and Nutrient Adminstration: diet order  Knowledge/Beliefs/Attitudes: food and nutrition knowledge/skill  Physical Activity and Function: nutrition-related ADLs and IADLs  Anthropometric Measurements: weight, weight change  Biochemical Data, Medical Tests and Procedures: electrolyte and renal panel, glucose/endocrine profile  Nutrition-Focused Physical Findings: overall appearance    Nutrition Risk    Level of Risk:  (2 x week)    Nutrition Follow-Up    RD Follow-up?: Yes

## 2018-01-26 NOTE — PLAN OF CARE
01/26/18 1708   Final Note   Assessment Type Final Discharge Note   Discharge Disposition LTAC   What phone number can be called within the next 1-3 days to see how you are doing after discharge? [phone]   Hospital Follow Up  Appt(s) scheduled? No   Discharge plans and expectations educations in teach back method with documentation complete? Yes   Right Care Referral Info   Referral Type LTAC   [place] [address]   provided patient/sister copy of letter that SW enclosed to discharge planner at [facility] that lists patient preferences of hemo dialysis and SNF in [place] or [place] (close to son). Have had discussions with patient and sister that if patient needs to discharge locally, that he still has right to continue his goal to move closer to son.   Packet for LTAC includes orders, facility transfer report, recent overview print out, letter to [facility]  with facilities attached, patient copy of his [place], PPD test readings. Ellyn with [facility] took the hep panel results with her to [facility] today.   KERRY William called report to [facility], awaiting ambulance at this time.

## 2018-01-26 NOTE — PROGRESS NOTES
01/26/18 0733   Patient Assessment/Suction   Level of Consciousness (AVPU) alert   Respiratory Effort Normal;Unlabored   Expansion/Accessory Muscles/Retractions expansion symmetric   All Lung Fields Breath Sounds clear;equal bilaterally   Rhythm/Pattern, Respiratory pattern regular   PRE-TX-[id number]-ETCO2   O2 Device (Oxygen Therapy) room air

## 2018-01-26 NOTE — PROGRESS NOTES
"                               Renal ICU Progress Note    Date of Admission:  1/18/2018 11:28 AM      ROS:  No new problems      Scheduled Meds:   amiodarone  200 mg Oral BID    docusate sodium  100 mg Oral BID    heparin (porcine)  5,000 Units Subcutaneous Q8H    insulin detemir  12 Units Subcutaneous QHS    levothyroxine  100 mcg Intravenous Daily    methylPREDNISolone sodium succinate  20 mg Intravenous Q12H    pantoprazole  40 mg Intravenous BID    piperacillin-tazobactam (ZOSYN) IVPB  4.5 g Intravenous Q12H     Vital Signs Range (Last 24H):  Temp:  [97.8 °F (36.6 °C)-98.7 °F (37.1 °C)]   Pulse:  [62-83]   Resp:  [16-33]   BP: (135-202)/()   SpO2:  [83 %-98 %]     I & O (Last 24H):    Intake/Output Summary (Last 24 hours) at 01/26/18 1022  Last data filed at 01/26/18 0601   Gross per 24 hour   Intake             1060 ml   Output             4470 ml   Net            -3410 ml           Physical Exam: unchanged    Laboratory:  CBC:     Recent Labs  Lab 01/26/18  0150   WBC 12.46   RBC 3.13*   HGB 9.2*   HCT 26.9*      MCV 86   MCH 29.4   MCHC 34.2     CMP:     Recent Labs  Lab 01/21/18  0245  01/26/18  0150   *  192*  < > 144*   CALCIUM 7.1*  7.1*  < > 8.3*   ALBUMIN 2.3*  --   --    PROT 4.4*  --   --    *  135*  < > 140   K 4.1  4.1  < > 3.6   CO2 21*  21*  < > 27     102  < > 104   BUN 39*  39*  < > 25*   CREATININE 5.1*  5.1*  < > 2.8*   ALKPHOS 78  --   --    ALT 10  --   --    AST 20  --   --    BILITOT 0.9  --   --    < > = values in this interval not displayed.  Phosphorus         14.4 4.3 4.2     Phosphorus       Assessment:    Non oliguric SOURAV on top of CKD-4 requiring Hemodialysis   Suspect pte. Has ESRD and will not recover from this insult  S/p Acute respiratory failure extubated   A-Fib w/ intermittent RVR  S/p GI bleed remains NPO  Hx. DM type 2  Hypotension - resolved -  Suspected "sepsis" w/ SIRS criteria, so far all cultures negative  Anemia of acute " loss  Resolving Metabolic (lactic) acidosis  Hypoalbuminemia    Plan:    - Dialysis Q TTS   - LTAC evaluation in progress  - Continue Cefepime with dose adjusted to current GFR  - Renal ADA diet  - Amiodarone p.o. Per Cardiology  - D/C Karina   - Taper down IV Medrol              Arvind Proctor  1/26/2018

## 2018-01-26 NOTE — PLAN OF CARE
Recommendations     Recommendation/Intervention:   1. Add Dental Soft restriction to diet order   2. Rec boost glucose chocolate tid   3. Aid with BM (last noted on 1/19)  4. Provided Renal diet education   5. RD to monitor     Goals: Meet >85% EEN  Nutrition Goal Status: new  Communication of RD Recs: reviewed with RN     Continuum of Care Plan     Referral to Outpatient Services:  (D/C planning: Renal/2000 ADA/Dental Soft/supplements)

## 2018-01-26 NOTE — SUBJECTIVE & OBJECTIVE
Interval History: Feels good.  Denies any chest pain or shortness of breath    Telemetry: Normal sinus rhythm with PACs    Review of Systems   All other systems reviewed and are negative.    Objective:     Vital Signs (Most Recent):  Temp: 98.3 °F (36.8 °C) (01/26/18 0301)  Pulse: 78 (01/26/18 0608)  Resp: (!) 22 (01/26/18 0608)  BP: (!) 177/77 (01/26/18 0606)  SpO2: 96 % (01/26/18 0608) Vital Signs (24h Range):  Temp:  [97.8 °F (36.6 °C)-98.7 °F (37.1 °C)] 98.3 °F (36.8 °C)  Pulse:  [62-83] 78  Resp:  [16-33] 22  SpO2:  [83 %-98 %] 96 %  BP: (135-202)/() 177/77     Weight: 80.4 kg (177 lb 4 oz)  Body mass index is 26.18 kg/m².     SpO2: 96 %  O2 Device (Oxygen Therapy): room air      Intake/Output Summary (Last 24 hours) at 01/26/18 1021  Last data filed at 01/26/18 0601   Gross per 24 hour   Intake             1060 ml   Output             4470 ml   Net            -3410 ml       Lines/Drains/Airways     Central Venous Catheter Line                 Percutaneous Central Line Insertion/Assessment - triple lumen  01/18/18 1500 right internal jugular 7 days         Hemodialysis Catheter 01/25/18 0848 right internal jugular 1 day          Drain                 Urethral Catheter 01/18/18 1200 7 days                Physical Exam   Constitutional: He is oriented to person, place, and time. No distress.   Cardiovascular: Normal rate.  A regularly irregular rhythm present.   Pulmonary/Chest: Effort normal and breath sounds normal.   Musculoskeletal: He exhibits no edema.   Neurological: He is alert and oriented to person, place, and time.       Significant Labs:   BMP:   Recent Labs  Lab 01/25/18  0215 01/25/18  1014 01/26/18  0150   *  --  144*     --  140   K 3.4*  --  3.6     --  104   CO2 25  --  27   BUN 39*  --  25*   CREATININE 4.0* 4.0* 2.8*   CALCIUM 8.4*  --  8.3*    and CBC   Recent Labs  Lab 01/25/18  0215 01/26/18  0150   WBC 14.72* 12.46   HGB 8.7* 9.2*   HCT 25.3* 26.9*    206        Significant Imaging: Echocardiogram:   2D echo with color flow doppler:   Results for orders placed or performed during the hospital encounter of 01/18/18   2D echo with color flow doppler   Result Value Ref Range    EF 60 55 - 65    Mitral Valve Regurgitation TRIVIAL     Diastolic Dysfunction No     Est. PA Systolic Pressure 31.09     Tricuspid Valve Regurgitation TRIVIAL TO MILD

## 2018-01-26 NOTE — PROGRESS NOTES
[facility]  Cardiology  Progress Note    Patient Name: ERNA Jesus  MRN: [id number]  Admission Date: 1/18/2018  Hospital Length of Stay: 8 days  Code Status: Full Code   Attending Physician: Giancarlo Cm MD   Primary Care Physician: No primary care provider on file.  Expected Discharge Date:   Principal Problem:Metabolic acidosis    Subjective:     Hospital Course:   1/22 Bradycardic yesterday. Rates improved now. Still having episodes of A-fib RVR  1/24 NSR with frequent PACs  1/25: No acute events    Interval History: Feels good.  Denies any chest pain or shortness of breath    Telemetry: Normal sinus rhythm with PACs    Review of Systems   All other systems reviewed and are negative.    Objective:     Vital Signs (Most Recent):  Temp: 98.3 °F (36.8 °C) (01/26/18 0301)  Pulse: 78 (01/26/18 0608)  Resp: (!) 22 (01/26/18 0608)  BP: (!) 177/77 (01/26/18 0606)  SpO2: 96 % (01/26/18 0608) Vital Signs (24h Range):  Temp:  [97.8 °F (36.6 °C)-98.7 °F (37.1 °C)] 98.3 °F (36.8 °C)  Pulse:  [62-83] 78  Resp:  [16-33] 22  SpO2:  [83 %-98 %] 96 %  BP: (135-202)/() 177/77     Weight: 80.4 kg (177 lb 4 oz)  Body mass index is 26.18 kg/m².     SpO2: 96 %  O2 Device (Oxygen Therapy): room air      Intake/Output Summary (Last 24 hours) at 01/26/18 1021  Last data filed at 01/26/18 0601   Gross per 24 hour   Intake             1060 ml   Output             4470 ml   Net            -3410 ml       Lines/Drains/Airways     Central Venous Catheter Line                 Percutaneous Central Line Insertion/Assessment - triple lumen  01/18/18 1500 right internal jugular 7 days         Hemodialysis Catheter 01/25/18 0848 right internal jugular 1 day          Drain                 Urethral Catheter 01/18/18 1200 7 days                Physical Exam   Constitutional: He is oriented to person, place, and time. No distress.   Cardiovascular: Normal rate.  A regularly irregular rhythm present.   Pulmonary/Chest:  Effort normal and breath sounds normal.   Musculoskeletal: He exhibits no edema.   Neurological: He is alert and oriented to person, place, and time.       Significant Labs:   BMP:   Recent Labs  Lab 01/25/18  0215 01/25/18  1014 01/26/18  0150   *  --  144*     --  140   K 3.4*  --  3.6     --  104   CO2 25  --  27   BUN 39*  --  25*   CREATININE 4.0* 4.0* 2.8*   CALCIUM 8.4*  --  8.3*    and CBC   Recent Labs  Lab 01/25/18  0215 01/26/18  0150   WBC 14.72* 12.46   HGB 8.7* 9.2*   HCT 25.3* 26.9*    206       Significant Imaging: Echocardiogram:   2D echo with color flow doppler:   Results for orders placed or performed during the hospital encounter of 01/18/18   2D echo with color flow doppler   Result Value Ref Range    EF 60 55 - 65    Mitral Valve Regurgitation TRIVIAL     Diastolic Dysfunction No     Est. PA Systolic Pressure 31.09     Tricuspid Valve Regurgitation TRIVIAL TO MILD      Assessment and Plan:     Brief HPI:     * Metabolic acidosis             Acute respiratory failure             Elevated troponin    Suspect related to demand ischemia and ARF. Ischemic evaluation when he stabilizes ok to be done as outpt        Encephalopathy, metabolic             Acute renal failure             Paroxysmal tachycardia    Appears to be PAF, currently back in NSR.  Echo with good EF. Change amiodarone to po. On SC heparin. Switch to eliquis prior to d/c  Add low dose bb        Lactic acid acidosis                 VTE Risk Mitigation         Ordered     heparin (porcine) injection 5,000 Units  Every 8 hours     Route:  Subcutaneous        01/18/18 1551     Place sequential compression device  Until discontinued      01/18/18 2016     heparin (porcine) injection 5,000 Units  As needed (PRN)     Route:  Intra-Catheter        01/18/18 1757     Medium Risk of VTE  Once      01/18/18 1551        No further recommendations from CV standpoint.  We'll see as needed.    Manish Ferguson,  MD  Cardiology  [facility]

## 2018-01-26 NOTE — NURSING
Patient's systolic blood pressure >180 after re-checking it 3x's. Notified Magaly Freire NP on call. Rec'd an order for a 1x dose of Hydralazine 10mg. Administered med at this time. Will continue to monitor.

## 2018-01-27 NOTE — PLAN OF CARE
Problem: Patient Care Overview  Goal: Plan of Care Review  Outcome: Ongoing (interventions implemented as appropriate)  Patient was transferred to [facility] via stretcher with 2 EMS escorts. He was very conversant upon discharge. His sister Naomie was notified of his leaving and planned on meeting him at [facility]. He sustained no injury fall or trauma during discharge.

## 2018-01-29 NOTE — PT/OT/SLP DISCHARGE
Physical Therapy Discharge Summary    Name: ERNA Jesus  MRN: [id number]   Principal Problem: Sepsis     Patient Discharged from acute Physical Therapy on 18.  Please refer to prior PT noted date on 18 for functional status.     Assessment:     Goals partially met. Patient appropriate for care in another setting.    Objective:     GOALS:    Physical Therapy Goals        Problem: Physical Therapy Goal    Goal Priority Disciplines Outcome Goal Variances Interventions   Physical Therapy Goal     PT/OT, PT Ongoing (interventions implemented as appropriate)     Description:  Goals to be met by: 18    Patient will increase functional independence with mobility by performin. Supine to sit with Minimal Assistance  2. Sit to stand transfer with Minimal Assistance  3. Bed to chair transfer with Minimal Assistance using Rolling Walker  4. Gait  x 25 feet with Minimal Assistance using Rolling Walker  5. Lower extremity exercise program x30 reps per handout, with supervision                      Reasons for Discontinuation of Therapy Services  Transfer to alternate level of care.      Plan:     Patient Discharged to: Long Term Acute Care.    Valentina Bradford, PT  2018

## 2018-01-30 NOTE — PHYSICIAN QUERY
PT Name: ERNA Jesus  MR #: [id number]  Physician Query Form - Renal Clarification     Meng Baig RN CDS    Contact Information: [phone]    This form is a permanent document in the medical record.     QueryDate: January 30, 2018    By submitting this query, we are merely seeking further clarification of documentation. Please utilize your independent clinical judgment when addressing the question(s) below.    The Medical record contains the following:   Indicator Supporting Clinical Findings Location in Medical Record   X Kidney (Renal) Insufficiency     X Kidney (Renal) Failure / Injury Severe [facility]    Non oliguric [facility] requiring Hemodialysis  (prerenal v.s. ATN)     [facility] - prerenal/ATN    Non oliguric [facility] on top of CKD-4 requiring Hemodialysis   Suspect pte. Has ESRD and will not recover from this insult   Renal Consult 1/18    Renal ICU 1/18      Renal PN 1/20      Renal ICU 1/24   X Nephrotoxic Agents Avoid nephrotoxic medications such as NSAIDs, IV contrast, or RAAS blockade Hosp PN 1/18   X BUN/Creatinine GFR BUN 25, 31, 29, 39, 46, 52    CR 5.1, 5.5,  4.6, 4.4, 5.4  GFR 11, 10, 13, 11, 10, 17, 22 Lab 1/19-1/26   X Urine: Casts         Eosinophils Urine Casts 1    X Dehydration Dehydration     Nausea/Vomiting     X Dialysis/CRRT      Hemodialysis Catheter 01/25/18 0848 right internal jugular 1 day         Required emergent dialysis for correction of severe acid-base disturbance   - Dialysis Today and Q TTS for now   He received HD at bedside today with no fluid removed,only filtration   Cardiology PN 1/26            Interval H&P 1/25      RN Care Plan 1/26    RN Care Plan 1/23   X Treatment: epoetin christos injection 10,000 Units  :  Dose 10,000 Units  :    sodium bicarbonate 8.4 % (1 mEq/mL) injection 100 mEq  :  Dose 100 mEq  :    sodium phosphate 30 mmol in dextrose 5 % 500 mL IVPB  :  Dose 30 mmol      Norepinephrine 4 mg IV  titration    Calcium gluconate 1 gm IV premix IV    Severe wide anion gap met  acidosis (L AC 12+)  Sepsis until proven otherwise  Hyper K  DM  Mild relative hypotension  Hyper po4      Admit to ICU ASAP  ASAP HD (do or die)  TLC  Cont bicab drip  CA iv  Might need intubation and mechanical   Pt on HD not stable, on levophed, hypothermia continues. Ca and bicarb given, bicarb drip increased.  MAR 1/25        MAR 1/18-1/19      MAR 1/20        MAR 1/18      MAR 1/21        Renal Consult 1/18   X Other:  possible sepsis with septic shock      Hosp PN 1/19       Provider, please specify the diagnosis or diagnoses associated with above clinical findings.    [ ] Acute Kidney Failure/Injury with Acute Cortical Necrosis    [ ] Acute Kidney Failure/Injury with Tubular Necrosis    [x ] Other Acute Kidney Failure/Injury (please specify): __s/p dehydration and sepsis__________    [ ] Other (please specify): _______________________________    [ ] Clinically Undetermined    Please document in your progress notes daily for the duration of treatment, until resolved, and include in your discharge summary.

## 2018-01-31 LAB — HAEM INFLU B IGG SER-MCNC: <0.11 MG/L
